# Patient Record
Sex: FEMALE | Race: WHITE | Employment: OTHER | URBAN - METROPOLITAN AREA
[De-identification: names, ages, dates, MRNs, and addresses within clinical notes are randomized per-mention and may not be internally consistent; named-entity substitution may affect disease eponyms.]

---

## 2019-03-04 ENCOUNTER — HOSPITAL ENCOUNTER (OUTPATIENT)
Dept: SURGERY | Age: 59
Discharge: HOME | End: 2019-03-04
Payer: OTHER GOVERNMENT

## 2019-03-04 VITALS
OXYGEN SATURATION: 99 % | DIASTOLIC BLOOD PRESSURE: 67 MMHG | BODY MASS INDEX: 35.14 KG/M2 | HEIGHT: 60 IN | WEIGHT: 179 LBS | TEMPERATURE: 98 F | SYSTOLIC BLOOD PRESSURE: 110 MMHG | RESPIRATION RATE: 16 BRPM | HEART RATE: 60 BPM

## 2019-03-04 LAB
ANION GAP SERPL CALC-SCNC: 7 MMOL/L (ref 7–16)
APPEARANCE UR: CLEAR
BACTERIA SPEC CULT: NORMAL
BASOPHILS # BLD: 0 K/UL (ref 0–0.2)
BASOPHILS NFR BLD: 1 % (ref 0–2)
BILIRUB UR QL: NEGATIVE
BUN SERPL-MCNC: 8 MG/DL (ref 6–23)
CALCIUM SERPL-MCNC: 8.9 MG/DL (ref 8.3–10.4)
CHLORIDE SERPL-SCNC: 107 MMOL/L (ref 98–107)
CO2 SERPL-SCNC: 29 MMOL/L (ref 21–32)
COLOR UR: YELLOW
CREAT SERPL-MCNC: 0.87 MG/DL (ref 0.6–1)
DIFFERENTIAL METHOD BLD: NORMAL
EOSINOPHIL # BLD: 0.2 K/UL (ref 0–0.8)
EOSINOPHIL NFR BLD: 3 % (ref 0.5–7.8)
ERYTHROCYTE [DISTWIDTH] IN BLOOD BY AUTOMATED COUNT: 12.4 % (ref 11.9–14.6)
GLUCOSE SERPL-MCNC: 102 MG/DL (ref 65–100)
GLUCOSE UR STRIP.AUTO-MCNC: NEGATIVE MG/DL
HCT VFR BLD AUTO: 39.7 % (ref 35.8–46.3)
HGB BLD-MCNC: 13.1 G/DL (ref 11.7–15.4)
HGB UR QL STRIP: NEGATIVE
IMM GRANULOCYTES # BLD AUTO: 0 K/UL (ref 0–0.5)
IMM GRANULOCYTES NFR BLD AUTO: 0 % (ref 0–5)
KETONES UR QL STRIP.AUTO: NEGATIVE MG/DL
LEUKOCYTE ESTERASE UR QL STRIP.AUTO: NEGATIVE
LYMPHOCYTES # BLD: 2 K/UL (ref 0.5–4.6)
LYMPHOCYTES NFR BLD: 40 % (ref 13–44)
MCH RBC QN AUTO: 31.3 PG (ref 26.1–32.9)
MCHC RBC AUTO-ENTMCNC: 33 G/DL (ref 31.4–35)
MCV RBC AUTO: 95 FL (ref 79.6–97.8)
MONOCYTES # BLD: 0.5 K/UL (ref 0.1–1.3)
MONOCYTES NFR BLD: 10 % (ref 4–12)
NEUTS SEG # BLD: 2.3 K/UL (ref 1.7–8.2)
NEUTS SEG NFR BLD: 46 % (ref 43–78)
NITRITE UR QL STRIP.AUTO: NEGATIVE
NRBC # BLD: 0 K/UL (ref 0–0.2)
PH UR STRIP: 6.5 [PH] (ref 5–9)
PLATELET # BLD AUTO: 213 K/UL (ref 150–450)
PMV BLD AUTO: 12.1 FL (ref 9.4–12.3)
POTASSIUM SERPL-SCNC: 3.7 MMOL/L (ref 3.5–5.1)
PROT UR STRIP-MCNC: NEGATIVE MG/DL
RBC # BLD AUTO: 4.18 M/UL (ref 4.05–5.2)
SERVICE CMNT-IMP: NORMAL
SODIUM SERPL-SCNC: 143 MMOL/L (ref 136–145)
SP GR UR REFRACTOMETRY: 1 (ref 1–1.02)
UROBILINOGEN UR QL STRIP.AUTO: 0.2 EU/DL (ref 0.2–1)
WBC # BLD AUTO: 5.1 K/UL (ref 4.3–11.1)

## 2019-03-04 PROCEDURE — 81003 URINALYSIS AUTO W/O SCOPE: CPT

## 2019-03-04 PROCEDURE — 85025 COMPLETE CBC W/AUTO DIFF WBC: CPT

## 2019-03-04 PROCEDURE — 87641 MR-STAPH DNA AMP PROBE: CPT

## 2019-03-04 PROCEDURE — 80048 BASIC METABOLIC PNL TOTAL CA: CPT

## 2019-03-04 RX ORDER — NITROFURANTOIN (MACROCRYSTALS) 100 MG/1
100 CAPSULE ORAL EVERY OTHER DAY
COMMUNITY

## 2019-03-04 RX ORDER — PANTOPRAZOLE SODIUM 40 MG/1
40 TABLET, DELAYED RELEASE ORAL 2 TIMES DAILY
COMMUNITY

## 2019-03-04 NOTE — PERIOP NOTES
Patient verified name, , and surgery as listed in [facility]. Patient provided medical/health information and PTA medications to the best of their ability. TYPE  CASE: 2  Orders per surgeon: not at time of PAT received and dated none  Labs per surgeon: cbc,bmp,urine,mrsa swab. Results: -  Labs per anesthesia protocol: none. Results -  EKG:  na     Nasal Swab collected per MD order and instructions for Mupirocin nasal ointment if required. Patient provided with and instructed on education handouts including Guide to Surgery, blood transfusions, pain management, and hand hygiene for the family and community, and [facility] brochure. Road to Recovery Spine surgery patient guide given. Instructed on incentive spirometry with return demonstration. Long handled prehab sponge given with instructions for use. Patient viewed spine prehab video. Hibiclens and instructions given per hospital policy. Instructed patient to continue previous medications as prescribed prior to surgery unless otherwise directed and to take the following medications the day of surgery according to anesthesia guidelines : klonopin as needed,estrogen,levothyroxine,medroxyprogesterone,nitrofurantoin,pantoprazole . Instructed patient to hold  the following medications on the day of surgery: celebrex. Original medication prescription bottles most visualized during patient appointment. Patient teach back successful and patient demonstrates knowledge of instruction.

## 2019-03-05 RX ORDER — CEFAZOLIN SODIUM/WATER 2 G/20 ML
2 SYRINGE (ML) INTRAVENOUS ONCE
Status: CANCELLED | OUTPATIENT
Start: 2019-03-05 | End: 2019-03-05

## 2019-03-07 NOTE — H&P
Chief complaint: Radiating neck pain. History of present illness: This patient is well-known to me from her previous lumbar surgery. She is satisfied in that regard. Now, she has been followed for about a 5 year history of neck pain with radiation to the left greater than right shoulder and upper extremity. She has known severe stenosis from C5-C7 based on MRI findings. She has had weakness in the left biceps. She had been managing the symptoms with Celebrex, aspirin, Tylenol, yoga, and has had a course of physical therapy and cervical traction. Despite these efforts, she has noticed an increase in unsteadiness on her feet and paresthesias in her hands. The pain is rated 8/10 on the visual analog scale. It is worse with overhead activity and some sleeping positions. PMHx/PSHx/Social History/Medications/Allergies/ROS: are listed separately in the electronic medical record and have been reviewed. ROS:     No fever, chills, or chest pain. ????? Medications: Celecoxib (200 MG); Medrol (4 MG, Take packet as instructed); NexIUM (40 MG); Synthroid (150 MCG)  ????? Allergies: NKDA  ?????    Physical Exam: This is a well developed well nourished adult female in no acute distress. Mood and affect are appropriate. Oriented to person, place, and time. Head is normocephalic and atraumatic. Extraocular movements intact. Sclera anicteric. Respirations are unlabored and there is no evidence of cyanosis. Chest is clear to auscultation. Heart is regular rate and rhythm. Abdomen is soft. There is subjective light touch sensory loss over the left lateral shoulder and lateral forearm. Spurlings is positive. The patient ambulates with a mildly unsteady gait. Deep tendon reflex testing in the upper extremity reveals the following. Right Left   Biceps (C5) 2 2   Brachio radialis (C6) 2 2    Triceps (C7) 2 2                 Bernards is positive on the left.   Ankle jerk is negative. Inverted radial reflex is negative. Romberg is positive. Strength testing in the upper extremity reveals the following based on the 5 point grading scale:     Delt(C5) Bicep(C6) WE(C6) Tricep (C7) WF(C7) (C8) Int (T1)   Right 5 5 5 5 5 5 5   Left 5 5 4 5 5 4 4     Pulses are palpable over bilateral radial arteries. Radiographic Studies:    X-rays and MRI were again independently reviewed and correlate with the patients symptoms. She has severe stenosis from C5-C7. AP and lateral views of the cervical spine obtained in office today show advanced degenerative disc with complete loss of disc height at C5-C6 and C6-C7. There is resultant kyphosis. Radiographic impression: Spondylotic kyphosis C5-C7. Assessment/Plan: This patients clinical history and physical exam is consistent with spondylotic kyphosis and cervical myelopathy. The imaging studies are concordant with the patients symptoms. Conservative efforts have been reasonably exhausted and the patient feels like she cannot go on with the symptoms as they are. We have previously discussed surgical options and now she would like to proceed with surgical scheduling.    ????? We discussed the details of the surgery including an incision over the left side of the front of the neck. The windpipe and foodpipe would be retracted to the side to expose the underlying spine. The appropriate disc would be identified with an X-ray and the disc would be removed. The nerves would be freed by trimming any impinging structures such as bone spurs and disc material.   The disc space would then be filled with a spacer and bone graft from a cadaver. A metal plate may be applied to augment the structure. A drain may be placed, and then the wound would be closed with suture and covered with sterile dressings. She would expect to stay in recovery for observation or in the hospital overnight depending on how quickly she recovers.  Follow-up would be scheduled for 2-3 weeks and she would have restrictions including no driving for 2 weeks, no lifting greater than 15 lbs. We also discussed the potential risks of the surgery including, but not limited to infection, spinal fluid leak, compressive hematoma; injury to spinal cord or peripheral nerve root resulting in paralysis, or loss of use of an extremity; persistent neck or arm symptoms or pain at the bone graft site; failure of the bone graft to heal or failure of the hardware resulting in the possibility of needing additional surgery; postoperative hoarseness or dysphagia; injury to an artery or vein resulting in significant blood loss; and the risks of anesthesia including, but not limited heart attack, stroke, blood clot or death. The patient was also given a brochure on anterior cervical discectomy and fusion. The patient voiced an understanding of these issues outlined. The procedure that I think may be beneficial here is an anterior cervical discectomy and fusion with interbody spacer, allograft and instrumentation from C5-7.             Electronically Signed By Evelyn Max MD

## 2019-03-11 ENCOUNTER — ANESTHESIA EVENT (OUTPATIENT)
Dept: SURGERY | Age: 59
End: 2019-03-11
Payer: OTHER GOVERNMENT

## 2019-03-12 ENCOUNTER — HOSPITAL ENCOUNTER (OUTPATIENT)
Age: 59
Setting detail: OBSERVATION
Discharge: HOME | End: 2019-03-13
Attending: ORTHOPAEDIC SURGERY | Admitting: ORTHOPAEDIC SURGERY
Payer: OTHER GOVERNMENT

## 2019-03-12 ENCOUNTER — ANESTHESIA (OUTPATIENT)
Dept: SURGERY | Age: 59
End: 2019-03-12
Payer: OTHER GOVERNMENT

## 2019-03-12 ENCOUNTER — APPOINTMENT (OUTPATIENT)
Dept: GENERAL RADIOLOGY | Age: 59
End: 2019-03-12
Attending: ORTHOPAEDIC SURGERY
Payer: OTHER GOVERNMENT

## 2019-03-12 DIAGNOSIS — M48.02 CERVICAL SPINAL STENOSIS: Primary | ICD-10-CM

## 2019-03-12 PROCEDURE — 74011250636 HC RX REV CODE- 250/636: Performed by: ORTHOPAEDIC SURGERY

## 2019-03-12 PROCEDURE — 77030039425 HC BLD LARYNG TRULITE DISP TELE -A: Performed by: ANESTHESIOLOGY

## 2019-03-12 PROCEDURE — 74011000250 HC RX REV CODE- 250: Performed by: ANESTHESIOLOGY

## 2019-03-12 PROCEDURE — 74011250637 HC RX REV CODE- 250/637: Performed by: ORTHOPAEDIC SURGERY

## 2019-03-12 PROCEDURE — 77030019908 HC STETH ESOPH SIMS -A: Performed by: ANESTHESIOLOGY

## 2019-03-12 PROCEDURE — 77030030163 HC BN WAX J&J -A: Performed by: ORTHOPAEDIC SURGERY

## 2019-03-12 PROCEDURE — 77030003666 HC NDL SPINAL BD -A: Performed by: ORTHOPAEDIC SURGERY

## 2019-03-12 PROCEDURE — 77030020782 HC GWN BAIR PAWS FLX 3M -B: Performed by: ANESTHESIOLOGY

## 2019-03-12 PROCEDURE — C1713 ANCHOR/SCREW BN/BN,TIS/BN: HCPCS | Performed by: ORTHOPAEDIC SURGERY

## 2019-03-12 PROCEDURE — 77030016570 HC BLNKT BAIR HGGR 3M -B: Performed by: ANESTHESIOLOGY

## 2019-03-12 PROCEDURE — 74011250636 HC RX REV CODE- 250/636: Performed by: ANESTHESIOLOGY

## 2019-03-12 PROCEDURE — 77030025623 HC BUR RND PRECIS STRY -D: Performed by: ORTHOPAEDIC SURGERY

## 2019-03-12 PROCEDURE — 74011000250 HC RX REV CODE- 250

## 2019-03-12 PROCEDURE — 77030003860 HC BIT DRL STRY -B: Performed by: ORTHOPAEDIC SURGERY

## 2019-03-12 PROCEDURE — 99218 HC RM OBSERVATION: CPT

## 2019-03-12 PROCEDURE — 76210000016 HC OR PH I REC 1 TO 1.5 HR: Performed by: ORTHOPAEDIC SURGERY

## 2019-03-12 PROCEDURE — 77030002986 HC SUT PROL J&J -A: Performed by: ORTHOPAEDIC SURGERY

## 2019-03-12 PROCEDURE — 77030039155 HC CGE SPN ANT CERV TRITANIUM STRY -G: Performed by: ORTHOPAEDIC SURGERY

## 2019-03-12 PROCEDURE — 74011000250 HC RX REV CODE- 250: Performed by: ORTHOPAEDIC SURGERY

## 2019-03-12 PROCEDURE — 74011250636 HC RX REV CODE- 250/636

## 2019-03-12 PROCEDURE — 72040 X-RAY EXAM NECK SPINE 2-3 VW: CPT

## 2019-03-12 PROCEDURE — 74011250637 HC RX REV CODE- 250/637: Performed by: ANESTHESIOLOGY

## 2019-03-12 PROCEDURE — 77030018836 HC SOL IRR NACL ICUM -A: Performed by: ORTHOPAEDIC SURGERY

## 2019-03-12 PROCEDURE — 77030032490 HC SLV COMPR SCD KNE COVD -B: Performed by: ORTHOPAEDIC SURGERY

## 2019-03-12 PROCEDURE — 77030009868 HC PIN DISTR CASPR AESC -B: Performed by: ORTHOPAEDIC SURGERY

## 2019-03-12 PROCEDURE — 76060000034 HC ANESTHESIA 1.5 TO 2 HR: Performed by: ORTHOPAEDIC SURGERY

## 2019-03-12 PROCEDURE — 77030031139 HC SUT VCRL2 J&J -A: Performed by: ORTHOPAEDIC SURGERY

## 2019-03-12 PROCEDURE — 76010000161 HC OR TIME 1 TO 1.5 HR INTENSV-TIER 1: Performed by: ORTHOPAEDIC SURGERY

## 2019-03-12 PROCEDURE — 77030011267 HC ELECTRD BLD COVD -A: Performed by: ORTHOPAEDIC SURGERY

## 2019-03-12 PROCEDURE — 77030037088 HC TUBE ENDOTRACH ORAL NSL COVD-A: Performed by: ANESTHESIOLOGY

## 2019-03-12 DEVICE — ANTERIOR CERVICAL CAGE
Type: IMPLANTABLE DEVICE | Site: SPINE CERVICAL | Status: FUNCTIONAL
Brand: TRITANIUM C

## 2019-03-12 DEVICE — TWO-LEVEL ANTERIOR PLATE
Type: IMPLANTABLE DEVICE | Site: SPINE CERVICAL | Status: FUNCTIONAL
Brand: AVIATOR

## 2019-03-12 DEVICE — VARIABLE, SELF TAPPING SCREW
Type: IMPLANTABLE DEVICE | Site: SPINE CERVICAL | Status: FUNCTIONAL
Brand: AVIATOR

## 2019-03-12 DEVICE — BIO DBM PUTTY
Type: IMPLANTABLE DEVICE | Site: SPINE CERVICAL | Status: FUNCTIONAL
Brand: BIO DBM

## 2019-03-12 DEVICE — GRAFT BNE SUB 1CC 2MM GRAN ALLOGENIC MORPHOGENETIC PROT W: Type: IMPLANTABLE DEVICE | Site: SPINE CERVICAL | Status: FUNCTIONAL

## 2019-03-12 RX ORDER — SODIUM CHLORIDE, SODIUM LACTATE, POTASSIUM CHLORIDE, CALCIUM CHLORIDE 600; 310; 30; 20 MG/100ML; MG/100ML; MG/100ML; MG/100ML
50 INJECTION, SOLUTION INTRAVENOUS CONTINUOUS
Status: DISCONTINUED | OUTPATIENT
Start: 2019-03-12 | End: 2019-03-12 | Stop reason: HOSPADM

## 2019-03-12 RX ORDER — OXYCODONE HYDROCHLORIDE 5 MG/1
5 TABLET ORAL
Status: DISCONTINUED | OUTPATIENT
Start: 2019-03-12 | End: 2019-03-13 | Stop reason: HOSPADM

## 2019-03-12 RX ORDER — ALBUTEROL SULFATE 0.83 MG/ML
2.5 SOLUTION RESPIRATORY (INHALATION) AS NEEDED
Status: DISCONTINUED | OUTPATIENT
Start: 2019-03-12 | End: 2019-03-12 | Stop reason: HOSPADM

## 2019-03-12 RX ORDER — ROCURONIUM BROMIDE 10 MG/ML
INJECTION, SOLUTION INTRAVENOUS AS NEEDED
Status: DISCONTINUED | OUTPATIENT
Start: 2019-03-12 | End: 2019-03-12 | Stop reason: HOSPADM

## 2019-03-12 RX ORDER — LIDOCAINE HYDROCHLORIDE 20 MG/ML
INJECTION, SOLUTION EPIDURAL; INFILTRATION; INTRACAUDAL; PERINEURAL AS NEEDED
Status: DISCONTINUED | OUTPATIENT
Start: 2019-03-12 | End: 2019-03-12 | Stop reason: HOSPADM

## 2019-03-12 RX ORDER — PANTOPRAZOLE SODIUM 40 MG/1
40 TABLET, DELAYED RELEASE ORAL
Status: DISCONTINUED | OUTPATIENT
Start: 2019-03-12 | End: 2019-03-13 | Stop reason: HOSPADM

## 2019-03-12 RX ORDER — MIDAZOLAM HYDROCHLORIDE 1 MG/ML
2 INJECTION, SOLUTION INTRAMUSCULAR; INTRAVENOUS ONCE
Status: DISCONTINUED | OUTPATIENT
Start: 2019-03-12 | End: 2019-03-12 | Stop reason: HOSPADM

## 2019-03-12 RX ORDER — DEXAMETHASONE SODIUM PHOSPHATE 4 MG/ML
INJECTION, SOLUTION INTRA-ARTICULAR; INTRALESIONAL; INTRAMUSCULAR; INTRAVENOUS; SOFT TISSUE AS NEEDED
Status: DISCONTINUED | OUTPATIENT
Start: 2019-03-12 | End: 2019-03-12 | Stop reason: HOSPADM

## 2019-03-12 RX ORDER — ACETAMINOPHEN 10 MG/ML
1000 INJECTION, SOLUTION INTRAVENOUS
Status: COMPLETED | OUTPATIENT
Start: 2019-03-12 | End: 2019-03-12

## 2019-03-12 RX ORDER — SCOLOPAMINE TRANSDERMAL SYSTEM 1 MG/1
1 PATCH, EXTENDED RELEASE TRANSDERMAL ONCE
Status: DISPENSED | OUTPATIENT
Start: 2019-03-12 | End: 2019-03-13

## 2019-03-12 RX ORDER — HYDROMORPHONE HYDROCHLORIDE 2 MG/ML
0.5 INJECTION, SOLUTION INTRAMUSCULAR; INTRAVENOUS; SUBCUTANEOUS
Status: COMPLETED | OUTPATIENT
Start: 2019-03-12 | End: 2019-03-12

## 2019-03-12 RX ORDER — FENTANYL CITRATE 50 UG/ML
100 INJECTION, SOLUTION INTRAMUSCULAR; INTRAVENOUS ONCE
Status: DISCONTINUED | OUTPATIENT
Start: 2019-03-12 | End: 2019-03-12 | Stop reason: HOSPADM

## 2019-03-12 RX ORDER — APREPITANT 40 MG/1
40 CAPSULE ORAL ONCE
Status: ACTIVE | OUTPATIENT
Start: 2019-03-12 | End: 2019-03-13

## 2019-03-12 RX ORDER — SODIUM CHLORIDE, SODIUM LACTATE, POTASSIUM CHLORIDE, CALCIUM CHLORIDE 600; 310; 30; 20 MG/100ML; MG/100ML; MG/100ML; MG/100ML
75 INJECTION, SOLUTION INTRAVENOUS CONTINUOUS
Status: DISCONTINUED | OUTPATIENT
Start: 2019-03-12 | End: 2019-03-12 | Stop reason: HOSPADM

## 2019-03-12 RX ORDER — GLYCOPYRROLATE 0.2 MG/ML
INJECTION INTRAMUSCULAR; INTRAVENOUS AS NEEDED
Status: DISCONTINUED | OUTPATIENT
Start: 2019-03-12 | End: 2019-03-12 | Stop reason: HOSPADM

## 2019-03-12 RX ORDER — PROPOFOL 10 MG/ML
INJECTION, EMULSION INTRAVENOUS AS NEEDED
Status: DISCONTINUED | OUTPATIENT
Start: 2019-03-12 | End: 2019-03-12 | Stop reason: HOSPADM

## 2019-03-12 RX ORDER — OXYCODONE HYDROCHLORIDE 5 MG/1
5 TABLET ORAL
Status: COMPLETED | OUTPATIENT
Start: 2019-03-12 | End: 2019-03-12

## 2019-03-12 RX ORDER — NEOSTIGMINE METHYLSULFATE 1 MG/ML
INJECTION INTRAVENOUS AS NEEDED
Status: DISCONTINUED | OUTPATIENT
Start: 2019-03-12 | End: 2019-03-12 | Stop reason: HOSPADM

## 2019-03-12 RX ORDER — OXYCODONE HYDROCHLORIDE 5 MG/1
5 TABLET ORAL
Qty: 40 TAB | Refills: 0 | Status: SHIPPED | OUTPATIENT
Start: 2019-03-12 | End: 2019-03-15

## 2019-03-12 RX ORDER — CEFAZOLIN SODIUM/WATER 2 G/20 ML
2 SYRINGE (ML) INTRAVENOUS ONCE
Status: COMPLETED | OUTPATIENT
Start: 2019-03-12 | End: 2019-03-12

## 2019-03-12 RX ORDER — HYDRALAZINE HYDROCHLORIDE 20 MG/ML
10 INJECTION INTRAMUSCULAR; INTRAVENOUS
Status: COMPLETED | OUTPATIENT
Start: 2019-03-12 | End: 2019-03-12

## 2019-03-12 RX ORDER — FENTANYL CITRATE 50 UG/ML
INJECTION, SOLUTION INTRAMUSCULAR; INTRAVENOUS AS NEEDED
Status: DISCONTINUED | OUTPATIENT
Start: 2019-03-12 | End: 2019-03-12 | Stop reason: HOSPADM

## 2019-03-12 RX ORDER — ONDANSETRON 4 MG/1
4 TABLET, ORALLY DISINTEGRATING ORAL
Status: DISCONTINUED | OUTPATIENT
Start: 2019-03-12 | End: 2019-03-13 | Stop reason: HOSPADM

## 2019-03-12 RX ORDER — LIDOCAINE HYDROCHLORIDE 10 MG/ML
0.1 INJECTION INFILTRATION; PERINEURAL AS NEEDED
Status: DISCONTINUED | OUTPATIENT
Start: 2019-03-12 | End: 2019-03-12 | Stop reason: HOSPADM

## 2019-03-12 RX ORDER — ONDANSETRON 2 MG/ML
INJECTION INTRAMUSCULAR; INTRAVENOUS AS NEEDED
Status: DISCONTINUED | OUTPATIENT
Start: 2019-03-12 | End: 2019-03-12 | Stop reason: HOSPADM

## 2019-03-12 RX ORDER — MIDAZOLAM HYDROCHLORIDE 1 MG/ML
2 INJECTION, SOLUTION INTRAMUSCULAR; INTRAVENOUS
Status: COMPLETED | OUTPATIENT
Start: 2019-03-12 | End: 2019-03-12

## 2019-03-12 RX ADMIN — PANTOPRAZOLE SODIUM 40 MG: 40 TABLET, DELAYED RELEASE ORAL at 23:39

## 2019-03-12 RX ADMIN — LIDOCAINE HYDROCHLORIDE 80 MG: 20 INJECTION, SOLUTION EPIDURAL; INFILTRATION; INTRACAUDAL; PERINEURAL at 12:37

## 2019-03-12 RX ADMIN — HYDROMORPHONE HYDROCHLORIDE 0.5 MG: 2 INJECTION, SOLUTION INTRAMUSCULAR; INTRAVENOUS; SUBCUTANEOUS at 14:06

## 2019-03-12 RX ADMIN — ROCURONIUM BROMIDE 50 MG: 10 INJECTION, SOLUTION INTRAVENOUS at 12:38

## 2019-03-12 RX ADMIN — ONDANSETRON 4 MG: 4 TABLET, ORALLY DISINTEGRATING ORAL at 23:39

## 2019-03-12 RX ADMIN — PROPOFOL 150 MG: 10 INJECTION, EMULSION INTRAVENOUS at 12:37

## 2019-03-12 RX ADMIN — SODIUM CHLORIDE, SODIUM LACTATE, POTASSIUM CHLORIDE, AND CALCIUM CHLORIDE 75 ML/HR: 600; 310; 30; 20 INJECTION, SOLUTION INTRAVENOUS at 10:52

## 2019-03-12 RX ADMIN — ONDANSETRON 4 MG: 4 TABLET, ORALLY DISINTEGRATING ORAL at 19:41

## 2019-03-12 RX ADMIN — MIDAZOLAM HYDROCHLORIDE 2 MG: 2 INJECTION, SOLUTION INTRAMUSCULAR; INTRAVENOUS at 12:21

## 2019-03-12 RX ADMIN — FENTANYL CITRATE 100 MCG: 50 INJECTION, SOLUTION INTRAMUSCULAR; INTRAVENOUS at 12:37

## 2019-03-12 RX ADMIN — HYDRALAZINE HYDROCHLORIDE 10 MG: 20 INJECTION INTRAMUSCULAR; INTRAVENOUS at 14:46

## 2019-03-12 RX ADMIN — HYDROMORPHONE HYDROCHLORIDE 0.5 MG: 2 INJECTION, SOLUTION INTRAMUSCULAR; INTRAVENOUS; SUBCUTANEOUS at 14:30

## 2019-03-12 RX ADMIN — FENTANYL CITRATE 25 MCG: 50 INJECTION, SOLUTION INTRAMUSCULAR; INTRAVENOUS at 13:17

## 2019-03-12 RX ADMIN — FENTANYL CITRATE 25 MCG: 50 INJECTION, SOLUTION INTRAMUSCULAR; INTRAVENOUS at 13:12

## 2019-03-12 RX ADMIN — PROPOFOL 20 MG: 10 INJECTION, EMULSION INTRAVENOUS at 13:42

## 2019-03-12 RX ADMIN — HYDROMORPHONE HYDROCHLORIDE 0.5 MG: 2 INJECTION, SOLUTION INTRAMUSCULAR; INTRAVENOUS; SUBCUTANEOUS at 14:39

## 2019-03-12 RX ADMIN — ONDANSETRON 4 MG: 2 INJECTION INTRAMUSCULAR; INTRAVENOUS at 13:39

## 2019-03-12 RX ADMIN — OXYCODONE HYDROCHLORIDE 5 MG: 5 TABLET ORAL at 19:41

## 2019-03-12 RX ADMIN — ACETAMINOPHEN 1000 MG: 10 INJECTION, SOLUTION INTRAVENOUS at 15:02

## 2019-03-12 RX ADMIN — OXYCODONE HYDROCHLORIDE 5 MG: 5 TABLET ORAL at 23:39

## 2019-03-12 RX ADMIN — NEOSTIGMINE METHYLSULFATE 4 MG: 1 INJECTION INTRAVENOUS at 13:39

## 2019-03-12 RX ADMIN — Medication 3 AMPULE: at 10:52

## 2019-03-12 RX ADMIN — PROMETHAZINE HYDROCHLORIDE 6.25 MG: 25 INJECTION INTRAMUSCULAR; INTRAVENOUS at 15:39

## 2019-03-12 RX ADMIN — HYDROMORPHONE HYDROCHLORIDE 0.5 MG: 2 INJECTION, SOLUTION INTRAMUSCULAR; INTRAVENOUS; SUBCUTANEOUS at 14:23

## 2019-03-12 RX ADMIN — LEVOTHYROXINE SODIUM 175 MCG: 100 TABLET ORAL at 23:39

## 2019-03-12 RX ADMIN — OXYCODONE HYDROCHLORIDE 5 MG: 5 TABLET ORAL at 14:54

## 2019-03-12 RX ADMIN — FENTANYL CITRATE 50 MCG: 50 INJECTION, SOLUTION INTRAMUSCULAR; INTRAVENOUS at 13:04

## 2019-03-12 RX ADMIN — GLYCOPYRROLATE 0.6 MG: 0.2 INJECTION INTRAMUSCULAR; INTRAVENOUS at 13:39

## 2019-03-12 RX ADMIN — DEXAMETHASONE SODIUM PHOSPHATE 10 MG: 4 INJECTION, SOLUTION INTRA-ARTICULAR; INTRALESIONAL; INTRAMUSCULAR; INTRAVENOUS; SOFT TISSUE at 12:54

## 2019-03-12 RX ADMIN — Medication 2 G: at 12:48

## 2019-03-12 NOTE — PROGRESS NOTES
TRANSFER - IN REPORT:    Verbal report received from Patel(name) on Wilber Tacos  being received from [facility]) for ordered procedure      Report consisted of patients Situation, Background, Assessment and   Recommendations(SBAR). Information from the following report(s) SBAR was reviewed with the receiving nurse. Opportunity for questions and clarification was provided. Assessment completed upon patients arrival to unit and care assumed.

## 2019-03-12 NOTE — ANESTHESIA POSTPROCEDURE EVALUATION
Procedure(s):  ANTERIOR CERVICAL DISCECTOMY FUSION  WITH INTERBODY SPACER ALLOGRAFT INSTRUMENTATION C5-C7.     Anesthesia Post Evaluation      Multimodal analgesia: multimodal analgesia used between 6 hours prior to anesthesia start to PACU discharge  Patient location during evaluation: PACU  Patient participation: complete - patient participated  Level of consciousness: awake  Pain management: adequate  Airway patency: patent  Anesthetic complications: no  Cardiovascular status: acceptable  Respiratory status: acceptable, spontaneous ventilation and nonlabored ventilation  Hydration status: acceptable  Post anesthesia nausea and vomiting:  none      Visit Vitals  /86   Pulse 64   Temp 36.5 °C (97.7 °F)   Resp 22   Ht 5' (1.524 m)   Wt 79.5 kg (175 lb 3 oz)   SpO2 95%   BMI 34.21 kg/m²

## 2019-03-12 NOTE — PERIOP NOTES
Report received from [facility]. Patient waiting for placement on surgical floor, patient doing well, resting with her eyes closed, in no obvious signs of distress or needs at this time. Patient's  at bedside to accompany patient. Will continue to monitor patient in PACU.

## 2019-03-12 NOTE — ROUTINE PROCESS
TRANSFER - OUT REPORT:    Verbal [facility] to [facility] [phone] for routine post - op       Report consisted of patients Situation, Background, Assessment and   Recommendations(SBAR). Information from the following report(s) SBAR, OR Summary, Procedure Summary, Intake/Output and MAR was reviewed with the receiving nurse. Lines:   Peripheral IV 03/12/19 Left;Posterior Hand (Active)   Site Assessment Clean, dry, & intact 3/12/2019  4:49 PM   Phlebitis Assessment 0 3/12/2019  4:49 PM   Infiltration Assessment 0 3/12/2019  4:49 PM   Dressing Status Clean, dry, & intact 3/12/2019  4:49 PM   Dressing Type Transparent;Tape 3/12/2019  4:49 PM   Hub Color/Line Status Infusing 3/12/2019  4:49 PM   Alcohol Cap Used No 3/12/2019  4:49 PM        Opportunity for questions and clarification was provided. Patient transported with:   O2 @ 3 liters    VTE prophylaxis orders have been written for [facility]. Patient and family given floor number and nurses name. Family updated re: pt status after security code verified.  at bedside.

## 2019-03-12 NOTE — PROGRESS NOTES
END OF SHIFT NOTE:    INTAKE/OUTPUT  No intake/output data recorded. Voiding: YES  Catheter: NO  Drain:              Flatus: Patient does not have flatus present. Stool:  0 occurrences. Characteristics:       Emesis: 0 occurrences. Characteristics:        VITAL SIGNS  Patient Vitals for the past 12 hrs:   Temp Pulse Resp BP SpO2   03/12/19 1741 97.3 °F (36.3 °C) (!) 104 13 133/80 94 %   03/12/19 1723  96 12  94 %   03/12/19 1707  (!) 105 13 113/67 93 %   03/12/19 1649  (!) 110 13 125/70 93 %   03/12/19 1627  (!) 108 18 130/73 93 %   03/12/19 1607  (!) 107 14 124/64 92 %   03/12/19 1600  (!) 108 14  92 %   03/12/19 1546  (!) 114 15 137/67 92 %   03/12/19 1532  (!) 115 16 144/71 92 %   03/12/19 1518 97.9 °F (36.6 °C) (!) 105 15 153/73 93 %   03/12/19 1512  (!) 105 14 169/81 93 %   03/12/19 1503  97 20 175/88 94 %   03/12/19 1443  69 12 (!) 231/91 95 %   03/12/19 1436  70 13 (!) 214/91 96 %   03/12/19 1423  66 18 (!) 202/81 95 %   03/12/19 1417  62 16 194/79 95 %   03/12/19 1412  65 17 183/70 (!) 84 %   03/12/19 1409  60 15 176/77    03/12/19 1404 97.7 °F (36.5 °C) 64 22 195/86 95 %   03/12/19 1402  63   96 %   03/12/19 1029 98.5 °F (36.9 °C) 61 18 119/61 99 %       Pain Assessment  Pain Intensity 1: 0 (03/12/19 1649)  Pain Location 1: Neck  Pain Intervention(s) 1: Medication (see MAR), Emotional support       Ambulating  Yes    Shift report given to oncoming nurse at the bedside.     Zonia Hollis RN

## 2019-03-12 NOTE — OP NOTES
[address] [address]   [phone]    OPERATIVE REPORT  Patient ID:Sury Benitez  [id number]  1960  66 y.o. DATE OF SURGERY: 3/12/2019    SURGEON: Angie Thomas M.D. PREOPERATIVE DIAGNOSIS:  C5 - C7 stenosis. POSTOPERATIVE DIAGNOSIS:  C5 - C7 stenosis. PROCEDURE:     1. Anterior cervical diskectomy  C5 - C7 with decompression of the neural elements under microscope magnification. 2. Anterior cervical arthrodesis  C5 - C7.     3. Anterior cervical instrumentation  C5 - C7.     4. Insertion biomechanical device C5-6 and C6-7    ANESTHESIA:  General.    ESTIMATED BLOOD LOSS:  10 cc    INTRAOPERATIVE COMPLICATIONS:  None. POSTOPERATIVE CONDITION:  Stable. IMPLANTS:   Implant Name Type Inv. Item Serial No.  Lot No. LRB No. Used Action   GRAFT BNE GRAN 1ML -- OSTEOAMP - [id number]  GRAFT BNE GRAN 1ML -- OSTEOAMP [id number] [employer]  N/A 1 Implanted   GRAFT BNE DBM PTTY W/CHIPS 1ML -- BIO DBM - [id number]  GRAFT BNE DBM PTTY W/CHIPS 1ML -- BIO DBM  [employer]M [id number] N/A 1 Implanted   CAGE ANTR CERV [id number] STRL -- TRITANIUM-C - [id number]  CAGE ANTR CERV [id number] STRL -- TRITANIUM-C  [employer] [id number] N/A 1 Implanted   CAGE ANTR CERV [id number] STRL -- TRITANIUM-C - [id number]  CAGE ANTR CERV [id number] STRL -- TRITANIUM-C  [employer] HOWM [id number] N/A 1 Implanted   SCR BNE VA ST 4X14MM -- AVIATOR - [id number]  SCR BNE VA ST 4X14MM -- AVIATOR  [employer] HOWM [id number] N/A 6 Implanted   PLATE ANTR CERV 2 LVL SZ 26MM -- AVIATOR - [id number]  PLATE ANTR CERV 2 LVL SZ 26MM -- AVIATOR  F[employer] HOWM [id number] N/A 1 Implanted       INDICATIONS FOR PROCEDURE:  The patient has had persistent symptoms of cervical radiculopathy despite conservative treatment. The preoperative studies confirmed a concordant stenotic lesion resulting in neural inpingement.    The risks, benefits and potential complications of the above listed procedures were discussed with the patient in detail and an informed consent was obtained. DESCRIPTION OF PROCEDURE:  After adequate induction of general anesthesia the patient was positioned supine on the operating table. A shoulder roll was placed and the shoulders were taped caudally to facilitate intraoperative radiographic imaging. The neck was kept in a neutral position. Care was taken to pad all bony prominences. Preoperative antibiotics were given. The neck was prepped and draped in the usual sterile fashion. A time-out was called to confirm appropriate patient, proposed procedure and proposed incision site. With this confirmation an incision was created over the left anterior lateral aspect of the neck centered near the cricoid cartilage. Dissection was carried down through the platysma using electrocautery. A Sharma-Mcdonald approach was then performed down to the anterior cervical spine. A spinal needle was inserted in an interspace and a cross-table lateral fluoroscopic image was obtained. The appropriate level was marked with electrocautery and the spinal needle was removed. At this point the longus colli was elevated around the periphery of the appropriate disk space and Veneta retractors were  inserted beneath the longus colli. Veneta pins were then inserted in the C5 and C6 vertebral bodies and distraction applied across the annulus fibrosus. The operating microscope was draped and brought to the sterile field. An annulotomy was performed with a 15 blade and a complete diskectomy was performed using pituitary and 3 mm Kerrison. The diskectomy was carried out to the lateral border of the uncovertebral joints bilaterally. A 4 mm bur was then used to trim the anterior osteophytes as well as flatten the vertebral endplates in preparation for arthrodesis. The anterior aspect of the uncovertebral joints were also resected using the bur.   The posterior portions of the uncovertebral joints were taken down with a 2 mm Kerrison. An interval was developed in the posterior longitudinal ligament and annulus fibrosus with a micro nerve hook. A 2 mm Kerrison was then used to resect these structures out to the lateral border of the uncal vertebral joints bilaterally. A ball tipped nerve hook was used to palpate laterally and confirm no residual nerve root or spinal cord impingement. This was felt to be satisfactory bilaterally. The interbody sizing system was brought to the field and a size 5  lordotic spacer fit very nicely. The appropriate size spacer was selected and filled with allograft and impacted with a tamp and mallet after the wound was liberally irrigated. Taylor pins were then inserted in the C6 and C7 vertebral bodies and distraction applied across the annulus fibrosus. The operating microscope was draped and brought to the sterile field. An annulotomy was performed with a 15 blade and a complete diskectomy was performed using pituitary and 3 mm Kerrison. The diskectomy was carried out to the lateral border of the uncovertebral joints bilaterally. A 4 mm bur was then used to trim the anterior osteophytes as well as flatten the vertebral endplates in preparation for arthrodesis. The anterior aspect of the uncovertebral joints were also resected using the bur. The posterior portions of the uncovertebral joints were taken down with a 2 mm Kerrison. An interval was developed in the posterior longitudinal ligament and annulus fibrosus with a micro nerve hook. A 2 mm Kerrison was then used to resect these structures out to the lateral border of the uncal vertebral joints bilaterally. A ball tipped nerve hook was used to palpate laterally and confirm no residual nerve root or spinal cord impingement. This was felt to be satisfactory bilaterally.     The interbody sizing system was brought to the field and a size 6  lordotic spacer fit very nicely. The appropriate size spacer selected filled with allograft and impacted with a bone tamp and mallet after the wound was liberally irrigated. The anterior cervical plating system was brought to the field and an appropriate size plate was selected and applied across  C5 - C7. The peripheral screw holes were drilled and filled appropriate length screws. The locking mechanism was tightened. C-arm fluoroscopy was brought in and used to obtain AP and lateral images, both of which were felt to be satisfactory for appropriate spinal level, graft and hardware placement. The wound was liberally irrigated. The incision and the incision was closed in a layered fashion. Benzoin and Steri-Strips were applied. Sterile dressings were applied. The patient tolerated the procedure well and was returned to the post anesthesia care unit in stable condition.      Blayne Beltran MD

## 2019-03-12 NOTE — ANESTHESIA PREPROCEDURE EVALUATION
Anesthetic History     PONV          Review of Systems / Medical History      Pulmonary  Within defined limits                 Neuro/Psych         Headaches and psychiatric history     Cardiovascular                  Exercise tolerance: >4 METS     GI/Hepatic/Renal     GERD: well controlled           Endo/Other      Hypothyroidism  Obesity and arthritis     Other Findings              Physical Exam    Airway  Mallampati: II  TM Distance: 4 - 6 cm  Neck ROM: normal range of motion   Mouth opening: Normal     Cardiovascular    Rhythm: regular  Rate: normal         Dental    Dentition: Bridges     Pulmonary  Breath sounds clear to auscultation               Abdominal         Other Findings            Anesthetic Plan    ASA: 2  Anesthesia type: general          Induction: Intravenous  Anesthetic plan and risks discussed with: Patient

## 2019-03-12 NOTE — PROGRESS NOTES
03/12/19 1741   Dual Skin Pressure Injury Assessment   Dual Skin Pressure Injury Assessment WDL   Second Care Provider (Based on [address] [place] [address]) Enrike Billow, [place]

## 2019-03-13 VITALS
OXYGEN SATURATION: 93 % | BODY MASS INDEX: 34.39 KG/M2 | HEIGHT: 60 IN | SYSTOLIC BLOOD PRESSURE: 105 MMHG | TEMPERATURE: 98.2 F | DIASTOLIC BLOOD PRESSURE: 70 MMHG | WEIGHT: 175.19 LBS | RESPIRATION RATE: 18 BRPM | HEART RATE: 83 BPM

## 2019-03-13 PROCEDURE — 74011250637 HC RX REV CODE- 250/637: Performed by: ORTHOPAEDIC SURGERY

## 2019-03-13 PROCEDURE — 99218 HC RM OBSERVATION: CPT

## 2019-03-13 RX ADMIN — OXYCODONE HYDROCHLORIDE 5 MG: 5 TABLET ORAL at 04:27

## 2019-03-13 NOTE — DISCHARGE INSTRUCTIONS
Soft diet    MAY shower    LEAVE dressing on incision for 3 DAYS-->then may remove   (If dressing starts to fall off may re-secure with tape)    NO lifting anything heavier than 5LBS     MAY turn head to the right and left and look down--> MINIMIZE looking upwards    Avoid reaching above head    NO driving until directed by your doctor    DO NOT take any NSAIDS (either prescribed or over the counter until directed    (Aleve, Ibuprofen, Mobic, etc) as this will interfere with bone healing    Avoid having pets sleep in bed with you until incision is completely healed    CALL the doctor if:  Fever >100.5  ([phone])                 Incision becomes red ,swollen or opens up               Incision has yellow, thick drainage or an odor               Pain is not managed with prescribed medications               Excessive nausea and/or vomiting                                     Increased difficulty with swallowing        DISCHARGE SUMMARY from Nurse    PATIENT INSTRUCTIONS:    After general anesthesia or intravenous sedation, for 24 hours or while taking prescription Narcotics:  · Limit your activities  · Do not drive and operate hazardous machinery  · Do not make important personal or business decisions  · Do  not drink alcoholic beverages  · If you have not urinated within 8 hours after discharge, please contact your surgeon on call. Report the following to your surgeon:  · Excessive pain, swelling, redness or odor of or around the surgical area  · Temperature over 100.5  · Nausea and vomiting lasting longer than 4 hours or if unable to take medications  · Any signs of decreased circulation or nerve impairment to extremity: change in color, persistent  numbness, tingling, coldness or increase pain  · Any questions    What to do at Home:  Recommended activity: Activity as instructed by your surgeon.     If you experience any of the following symptoms: fever greater than 100.5, numbness or tingling or pain, nausea/ vomiting not controlled by medication, please follow up with your MD.    *  Please give a list of your current medications to your Primary Care Provider. *  Please update this list whenever your medications are discontinued, doses are      changed, or new medications (including over-the-counter products) are added. *  Please carry medication information at all times in case of emergency situations. These are general instructions for a healthy lifestyle:    No smoking/ No tobacco products/ Avoid exposure to second hand smoke  Surgeon General's Warning:  Quitting smoking now greatly reduces serious risk to your health. Obesity, smoking, and sedentary lifestyle greatly increases your risk for illness    A healthy diet, regular physical exercise & weight monitoring are important for maintaining a healthy lifestyle    You may be retaining fluid if you have a history of heart failure or if you experience any of the following symptoms:  Weight gain of 3 pounds or more overnight or 5 pounds in a week, increased swelling in our hands or feet or shortness of breath while lying flat in bed. Please call your doctor as soon as you notice any of these symptoms; do not wait until your next office visit. Recognize signs and symptoms of STROKE:    F-face looks uneven    A-arms unable to move or move unevenly    S-speech slurred or non-existent    T-time-call 911 as soon as signs and symptoms begin-DO NOT go       Back to bed or wait to see if you get better-TIME IS BRAIN. Warning Signs of HEART ATTACK     Call 911 if you have these symptoms:   Chest discomfort. Most heart attacks involve discomfort in the center of the chest that lasts more than a few minutes, or that goes away and comes back. It can feel like uncomfortable pressure, squeezing, fullness, or pain.  Discomfort in other areas of the upper body.  Symptoms can include pain or discomfort in one or both arms, the back, neck, jaw, or stomach.  Shortness of breath with or without chest discomfort.  Other signs may include breaking out in a cold sweat, nausea, or lightheadedness. Don't wait more than five minutes to call 911 - MINUTES MATTER! Fast action can save your life. Calling 911 is almost always the fastest way to get lifesaving treatment. Emergency Medical Services staff can begin treatment when they arrive -- up to an hour sooner than if someone gets to the hospital by car. The discharge information has been reviewed with the patient. The patient verbalized understanding. Discharge medications reviewed with the patient and appropriate educational materials and side effects teaching were provided. ___________________________________________________________________________________________________________________________________Discharge Instructions    Wound Care and Showering  Your wound will typically be covered with a clear plastic dressing when you go home from the hospital. Since it is transparent, you will see the underlying gauze turn red with blood which is normal. You do not need to change the dressing unless it is leaking from the edges. Otherwise leave this dressing in place. The clear plastic dressing is waterproof so you can take a shower while it is on. You may remove the clear plastic dressing and the underlying gauze 3 days after surgery. There will be small tape strips under the gauze which should be left in place. If there is no leaking from the wound, you may take a shower and allow the tape strips to get wet. Some of them may fall off. The remaining strips will be removed once you return to the office. If there is persistent leaking when you first remove the clear dressing, apply new gauze and new clear plastic dressing (typically purchased at a pharmacy) over the wound. Hair washing is permissible in the shower. No tub baths, hot tubs or whirlpools until seen in the office.  If any of the following should occur, please call the office:    -Persistent drainage from the incision site.  -Opening of incisions  -Fevers greater than 101 degrees  -Flu-like symptoms  -Increased redness    Exercise  You have unlimited walking and stair climbing privileges. Walking outside or walking on a treadmill without an incline is also allowed. Do NOT lift anything weighing greater than 10-15 lbs. Especially try to avoid lifting or reaching above your head. Sleeping  You may sleep in any comfortable position. Many patients find comfort sleeping in a recliner chair. It is normal to have difficulty sleeping for the first several weeks following your surgery. We recommend trying Benadryl, Melatonin, or Tylenol PM for help sleeping. All are over-the-counter and can be found in drugstores. Eating  Because of the tubes in your throat while asleep during surgery, it is normal to have a sore throat and some difficulty swallowing solid foods after your surgery. This may persist for several weeks. Eating soft foods like yogurt, macaroni, and mashed potatoes seem to help. Today, you may have bland foods, nothing spicy or greasy. Pain  If you feel you need pain medicine, you may take regular or extra-strength Tylenol. Do NOT take an anti-inflammatory medication such as Advil, Aleve, or Motrin for the first 8 weeks following your surgery. Anti-inflammatory medications like these hinder bone growth and healing, which is critical in the weeks following surgery. Do NOT resume taking Foasamax for 8 weeks after your fusion surgery. To help alleviate persistent soreness around the shoulder lades, apply ice or warm moist compresses. Driving  You may NOT drive a car until told otherwise by your physician. You may be a passenger for short distances (about 20-30 minutes). If you must take a longer trip, be sure to make several pit stops so that you can walk and stretch your legs.  Reclining in the passenger seat seems to be the most comfortable position for most patients. In some states, it is illegal to drive a car while wearing a neck brace. Follow up appointments  When you are discharged from the hospital, a follow up appointment will be made for 2-3 weeks from your surgery date. Call [phone] to confirm your appointment. After general anesthesia or intravenous sedation, for 24 hours or while taking prescription Narcotics:  · Limit your activities  · Do not drive and operate hazardous machinery  · Do not make important personal or business decisions  · Do  not drink alcoholic beverages  · If you have not urinated within 8 hours after discharge, please contact your surgeon on call. *  Please give a list of your current medications to your Primary Care Provider. *  Please update this list whenever your medications are discontinued, doses are      changed, or new medications (including over-the-counter products) are added. *  Please carry medication information at all times in case of emergency situations. These are general instructions for a healthy lifestyle:  No smoking/ No tobacco products/ Avoid exposure to second hand smoke  Surgeon General's Warning:  Quitting smoking now greatly reduces serious risk to your health. Obesity, smoking, and sedentary lifestyle greatly increases your risk for illness  A healthy diet, regular physical exercise & weight monitoring are important for maintaining a healthy lifestyle    You may be retaining fluid if you have a history of heart failure or if you experience any of the following symptoms:  Weight gain of 3 pounds or more overnight or 5 pounds in a week, increased swelling in our hands or feet or shortness of breath while lying flat in bed. Please call your doctor as soon as you notice any of these symptoms; do not wait until your next office visit.     Recognize signs and symptoms of STROKE:  F-face looks uneven  A-arms unable to move or move unevenly  S-speech slurred or non-existent  T-time-call 911 as soon as signs and symptoms begin-DO NOT go       Back to bed or wait to see if you get better-TIME IS BRAIN.

## 2019-03-13 NOTE — PROGRESS NOTES
Pt face and chest flushed and back very red; hot to touch. Pt denies itching or pain. States she went tanning this past weekend and she thinks she may be burnt from the tanning bed.

## 2019-03-13 NOTE — PROGRESS NOTES
Chart screened by  for discharge planning. No needs identified at this time. Please consult  if any new issues arise. Pt to discharge home this day. No voiced needs at this time. Milestones met.      Care Management Interventions  Transition of Care Consult (CM Consult): Discharge Planning

## 2020-06-23 ENCOUNTER — HOSPITAL ENCOUNTER (OUTPATIENT)
Dept: SURGERY | Age: 60
Discharge: HOME | End: 2020-06-23
Payer: OTHER GOVERNMENT

## 2020-06-23 ENCOUNTER — HOME HEALTH ADMISSION (OUTPATIENT)
Dept: HOME HEALTH SERVICES | Facility: HOME HEALTH | Age: 60
End: 2020-06-23
Payer: OTHER GOVERNMENT

## 2020-06-23 ENCOUNTER — HOSPITAL ENCOUNTER (OUTPATIENT)
Dept: PHYSICAL THERAPY | Age: 60
Discharge: HOME | End: 2020-06-23
Payer: OTHER GOVERNMENT

## 2020-06-23 VITALS
DIASTOLIC BLOOD PRESSURE: 85 MMHG | OXYGEN SATURATION: 97 % | RESPIRATION RATE: 12 BRPM | BODY MASS INDEX: 32.7 KG/M2 | WEIGHT: 166.56 LBS | SYSTOLIC BLOOD PRESSURE: 128 MMHG | TEMPERATURE: 98.1 F | HEIGHT: 60 IN | HEART RATE: 76 BPM

## 2020-06-23 LAB
ANION GAP SERPL CALC-SCNC: 8 MMOL/L (ref 7–16)
APTT PPP: 24.4 SEC (ref 24.3–35.4)
ATRIAL RATE: 61 BPM
BACTERIA SPEC CULT: NORMAL
BASOPHILS # BLD: 0.1 K/UL (ref 0–0.2)
BASOPHILS NFR BLD: 1 % (ref 0–2)
BUN SERPL-MCNC: 18 MG/DL (ref 6–23)
CALCIUM SERPL-MCNC: 9.2 MG/DL (ref 8.3–10.4)
CALCULATED P AXIS, ECG09: 25 DEGREES
CALCULATED R AXIS, ECG10: 27 DEGREES
CALCULATED T AXIS, ECG11: 33 DEGREES
CHLORIDE SERPL-SCNC: 107 MMOL/L (ref 98–107)
CO2 SERPL-SCNC: 26 MMOL/L (ref 21–32)
CREAT SERPL-MCNC: 0.78 MG/DL (ref 0.6–1)
DIAGNOSIS, 93000: NORMAL
DIFFERENTIAL METHOD BLD: NORMAL
EOSINOPHIL # BLD: 0.2 K/UL (ref 0–0.8)
EOSINOPHIL NFR BLD: 3 % (ref 0.5–7.8)
ERYTHROCYTE [DISTWIDTH] IN BLOOD BY AUTOMATED COUNT: 11.9 % (ref 11.9–14.6)
EST. AVERAGE GLUCOSE BLD GHB EST-MCNC: 105 MG/DL
GLUCOSE SERPL-MCNC: 93 MG/DL (ref 65–100)
HBA1C MFR BLD: 5.3 %
HCT VFR BLD AUTO: 40.8 % (ref 35.8–46.3)
HGB BLD-MCNC: 13.7 G/DL (ref 11.7–15.4)
IMM GRANULOCYTES # BLD AUTO: 0 K/UL (ref 0–0.5)
IMM GRANULOCYTES NFR BLD AUTO: 0 % (ref 0–5)
INR PPP: 0.9
LYMPHOCYTES # BLD: 2 K/UL (ref 0.5–4.6)
LYMPHOCYTES NFR BLD: 35 % (ref 13–44)
MCH RBC QN AUTO: 31.6 PG (ref 26.1–32.9)
MCHC RBC AUTO-ENTMCNC: 33.6 G/DL (ref 31.4–35)
MCV RBC AUTO: 94 FL (ref 79.6–97.8)
MONOCYTES # BLD: 0.5 K/UL (ref 0.1–1.3)
MONOCYTES NFR BLD: 8 % (ref 4–12)
NEUTS SEG # BLD: 3 K/UL (ref 1.7–8.2)
NEUTS SEG NFR BLD: 53 % (ref 43–78)
NRBC # BLD: 0 K/UL (ref 0–0.2)
P-R INTERVAL, ECG05: 124 MS
PLATELET # BLD AUTO: 209 K/UL (ref 150–450)
PMV BLD AUTO: 12.2 FL (ref 9.4–12.3)
POTASSIUM SERPL-SCNC: 4.1 MMOL/L (ref 3.5–5.1)
PROTHROMBIN TIME: 12.1 SEC (ref 12–14.7)
Q-T INTERVAL, ECG07: 394 MS
QRS DURATION, ECG06: 66 MS
QTC CALCULATION (BEZET), ECG08: 396 MS
RBC # BLD AUTO: 4.34 M/UL (ref 4.05–5.2)
SERVICE CMNT-IMP: NORMAL
SODIUM SERPL-SCNC: 141 MMOL/L (ref 136–145)
VENTRICULAR RATE, ECG03: 61 BPM
WBC # BLD AUTO: 5.8 K/UL (ref 4.3–11.1)

## 2020-06-23 PROCEDURE — 85025 COMPLETE CBC W/AUTO DIFF WBC: CPT

## 2020-06-23 PROCEDURE — 77030027138 HC INCENT SPIROMETER -A

## 2020-06-23 PROCEDURE — 85610 PROTHROMBIN TIME: CPT

## 2020-06-23 PROCEDURE — 36415 COLL VENOUS BLD VENIPUNCTURE: CPT

## 2020-06-23 PROCEDURE — 85730 THROMBOPLASTIN TIME PARTIAL: CPT

## 2020-06-23 PROCEDURE — 87641 MR-STAPH DNA AMP PROBE: CPT

## 2020-06-23 PROCEDURE — 80048 BASIC METABOLIC PNL TOTAL CA: CPT

## 2020-06-23 PROCEDURE — 97161 PT EVAL LOW COMPLEX 20 MIN: CPT

## 2020-06-23 PROCEDURE — 83036 HEMOGLOBIN GLYCOSYLATED A1C: CPT

## 2020-06-23 NOTE — PERIOP NOTES
Lab results within anesthesia guidelines. MRSA swab result pending. All results routed/faxed to pt's PCP, Júnior Chun MD, per surgeon's order. Recent Results (from the past 12 hour(s))   CBC WITH AUTOMATED DIFF    Collection Time: 06/23/20  9:33 AM   Result Value Ref Range    WBC 5.8 4.3 - 11.1 K/uL    RBC 4.34 4.05 - 5.2 M/uL    HGB 13.7 11.7 - 15.4 g/dL    HCT 40.8 35.8 - 46.3 %    MCV 94.0 79.6 - 97.8 FL    MCH 31.6 26.1 - 32.9 PG    MCHC 33.6 31.4 - 35.0 g/dL    RDW 11.9 11.9 - 14.6 %    PLATELET 805 118 - 196 K/uL    MPV 12.2 9.4 - 12.3 FL    ABSOLUTE NRBC 0.00 0.0 - 0.2 K/uL    DF AUTOMATED      NEUTROPHILS 53 43 - 78 %    LYMPHOCYTES 35 13 - 44 %    MONOCYTES 8 4.0 - 12.0 %    EOSINOPHILS 3 0.5 - 7.8 %    BASOPHILS 1 0.0 - 2.0 %    IMMATURE GRANULOCYTES 0 0.0 - 5.0 %    ABS. NEUTROPHILS 3.0 1.7 - 8.2 K/UL    ABS. LYMPHOCYTES 2.0 0.5 - 4.6 K/UL    ABS. MONOCYTES 0.5 0.1 - 1.3 K/UL    ABS. EOSINOPHILS 0.2 0.0 - 0.8 K/UL    ABS. BASOPHILS 0.1 0.0 - 0.2 K/UL    ABS. IMM.  GRANS. 0.0 0.0 - 0.5 K/UL   HEMOGLOBIN A1C WITH EAG    Collection Time: 06/23/20  9:33 AM   Result Value Ref Range    Hemoglobin A1c 5.3 %    Est. average glucose 084 mg/dL   METABOLIC PANEL, BASIC    Collection Time: 06/23/20  9:33 AM   Result Value Ref Range    Sodium 141 136 - 145 mmol/L    Potassium 4.1 3.5 - 5.1 mmol/L    Chloride 107 98 - 107 mmol/L    CO2 26 21 - 32 mmol/L    Anion gap 8 7 - 16 mmol/L    Glucose 93 65 - 100 mg/dL    BUN 18 6 - 23 MG/DL    Creatinine 0.78 0.6 - 1.0 MG/DL    GFR est AA >60 >60 ml/min/1.73m2    GFR est non-AA >60 >60 ml/min/1.73m2    Calcium 9.2 8.3 - 10.4 MG/DL   PROTHROMBIN TIME + INR    Collection Time: 06/23/20  9:33 AM   Result Value Ref Range    Prothrombin time 12.1 12.0 - 14.7 sec    INR 0.9     PTT    Collection Time: 06/23/20  9:33 AM   Result Value Ref Range    aPTT 24.4 24.3 - 35.4 SEC   EKG, 12 LEAD, INITIAL    Collection Time: 06/23/20 10:02 AM   Result Value Ref Range Ventricular Rate 61 BPM    Atrial Rate 61 BPM    P-R Interval 124 ms    QRS Duration 66 ms    Q-T Interval 394 ms    QTC Calculation (Bezet) 396 ms    Calculated P Axis 25 degrees    Calculated R Axis 27 degrees    Calculated T Axis 33 degrees    Diagnosis       Normal sinus rhythm  Low voltage QRS  Cannot rule out Anterior infarct (cited on or before 19-SEP-2015)  Abnormal ECG  When compared with ECG of 19-SEP-2015 06:09,  Vent.  rate has decreased BY  32 BPM  Questionable change in initial forces of Anterior leads  Non-specific change in ST segment in Inferior leads

## 2020-06-23 NOTE — PROGRESS NOTES
[facility] Case Management note:  Patient screened in Prehab for discharge planning needs, demographics verified on face sheet . Patient scheduled for a future total joint replacement on 7/16/20. We discussed Home Health and equipment needed after surgery, pt confirmed that they do not feel that she needs and DME at this time. Pt states that she has a walker at home. List of Home Health providers offered. Patient w/o preference towards provider.   Initial referral sent to [facility]

## 2020-06-23 NOTE — ADVANCED PRACTICE NURSE
Total Joint Surgery Preoperative Chart Review      Patient ID:  Tim Farah  [id number]  13 y.o.  1960  Surgeon: Dr. Med Valdez  Date of Surgery: 7/16/2020  Procedure: Total Left Knee Arthroplasty  Primary Care Physician: Jay Leyva MD None  Specialty Physician(s):      Subjective:   Tim Farah is a 64 y.o. WHITE OR  female who presents for preoperative evaluation for Total Left Knee arthroplasty. This is a preoperative chart review note based on data collected by the nurse at the surgical Pre-Assessment visit.     Past Medical History:   Diagnosis Date    Abdominal pain, epigastric     Abdominal pain, periumbilic     Absence of menstruation     Acute bronchitis 1/16/2015    Acute pancreatitis     Acute pharyngitis     Adverse effect of anesthesia     HTN after anesthesia (not hypo)     Anxiety     hx of anxiety/ situational  per pt- no longer on meds    Anxiety state, unspecified     Backache, unspecified 1/16/2015    Bronchitis, not specified as acute or chronic     Cervicalgia     Chest pain 11/17/2015    Chondromalacia of patella     Chronic maxillary sinusitis     Chronic UTI     managed with medication    Contusion of toe     Cough     Disturbance of skin sensation     Dyspepsia and other specified disorders of function of stomach     Esophageal reflux 1/16/2015    GERD (gastroesophageal reflux disease)     well controlled with meds    Hypersomnia due to medical condition 12/1/2014    Hypothyroidism 12/1/2014    Insomnia, unspecified     Irregular menstrual cycle 1/16/2015    Irritability     Lateral epicondylitis  of elbow     Lumbago     Migraine, unspecified, without mention of intractable migraine without mention of status migrainosus     Mixed hyperlipidemia 1/16/2015    Open wound of lip, without mention of complication     Open wound(s) (multiple) of unspecified site(s), without mention of complication     Other acute reactions to stress     Other and unspecified complications of medical care, not elsewhere classified     Other and unspecified hyperlipidemia     Other and unspecified mycoses     Other and unspecified noninfectious gastroenteritis and colitis(558.9) 1/16/2015    Other benign neoplasm of connective and other soft tissue of trunk, unspecified 1/16/2015    Other dysfunctions of sleep stages or arousal from sleep     Other joint derangement, not elsewhere classified, lower leg 1/16/2015    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)     Other specified symptom associated with female genital organs     Pain in joint, lower leg     Pain in joint, pelvic region and thigh     Pain in limb     Palpitations 11/17/2015    Persistent disorder of initiating or maintaining sleep 12/1/2014    Pneumonia, organism unspecified(486)     PONV (postoperative nausea and vomiting)     will usually have to stay in PACU a few hours due to N/V    Postmenopausal atrophic vaginitis     Prolonged depressive reaction     Pure hypercholesterolemia 1/16/2015    Rosacea 1/16/2015    Sacroiliitis, not elsewhere classified ([facility] [address]     Snores     states has not had sleep study for Dx of Sleep apnea    Spasm of muscle     Spondylosis of unspecified site without mention of myelopathy 1/16/2015    Sprain of ankle, unspecified site     Sprain of lumbar region     Symptomatic menopausal or female climacteric states     Synovial cyst of popliteal space 1/16/2015    Thoracic or lumbosacral neuritis or radiculitis, unspecified     Unspecified dermatitis due to sun 1/16/2015    Unspecified hypothyroidism 1/16/2015    Unspecified menopausal and postmenopausal disorder     Unspecified monoarthritis, ankle and foot     Unspecified pruritic disorder     Unspecified viral infection, in conditions classified elsewhere and of unspecified site     Urinary frequency       Past Surgical History:   Procedure Laterality Date    HX CERVICAL FUSION 2019    ANTERIOR CERVICAL DISCECTOMY Tim Benitez- Dr Yessenia Greenberg    HX  SECTION      x1    HX COLONOSCOPY      [facility]    HX HEART CATHETERIZATION      no blockages per pt- \"false positive stress test\"     HX LAP CHOLECYSTECTOMY      Dr Luz Elena Sanchez HX LUMBAR LAMINECTOMY      no fusion/hardware    HX TONSILLECTOMY       Family History   Problem Relation Age of Onset    Diabetes Mother    Katya Speck Bladder Disease Mother     Hypertension Mother     Stroke Maternal Grandmother       Social History     Tobacco Use    Smoking status: Former Smoker     Packs/day: 0.75     Years: 15.00     Pack years: 11.25     Last attempt to quit: 2010     Years since quitting: 10.1    Smokeless tobacco: Never Used   Substance Use Topics    Alcohol use: Yes     Alcohol/week: 5.0 standard drinks     Types: 3 Glasses of wine, 2 Standard drinks or equivalent per week       Prior to Admission medications    Medication Sig Start Date End Date Taking? Authorizing Provider   pantoprazole (PROTONIX) 40 mg tablet Take 40 mg by mouth two (2) times a day. Yes Provider, Historical   nitrofurantoin (MACRODANTIN) 100 mg capsule Take 100 mg by mouth daily. Yes Provider, Historical   celecoxib (CELEBREX) 200 mg capsule Take 1 Cap by mouth two (2) times a day. Patient taking differently: Take 200 mg by mouth nightly. 2/3/16  Yes Edna Wu MD   estrogens, conjugated,-methylTESTOSTERone (ESTRATEST HS) 0.625-1.25 mg per tablet Take 1 Tab by mouth daily. Max Daily Amount: 1 Tab. For vasomotor symptoms assoc with menopause 2/3/16  Yes Edna Wu MD   levothyroxine (SYNTHROID) 150 mcg tablet Take 1 Tab by mouth Daily (before breakfast). Name Brand ONLY  Patient taking differently: Take 150 mcg by mouth every other day.  Name Brand ONLY 16  Yes Edna Wu MD   medroxyPROGESTERone (PROVERA) 2.5 mg tablet TAKE 1 TABLET DAILY 11/4/15  Yes Edna Wu MD   fluticasone [facility]) 50 mcg/actuation nasal spray 2 sprays ea. Side daily  Patient taking differently: 2 Sprays by Both Nostrils route as needed. 2 sprays ea. Side daily 4/30/15  Yes Jeffrey Lamar MD   levothyroxine (SYNTHROID) 175 mcg tablet Take 1 Tab by mouth Daily (before breakfast). Branded Synthroid only: 175mcg on odd days and 150 on even days  Patient taking differently: Take 175 mcg by mouth every other day. Branded Synthroid only: 175mcg on odd days and 150 on even days 4/30/15  Yes Jeffrey Lamar MD   promethazine (PHENERGAN) 25 mg tablet Take 1 Tab by mouth every six (6) hours as needed for Nausea. 9/19/15   Nenita Leal MD   clonazePAM (KLONOPIN) 1 mg tablet Take 1 Tab by mouth daily. Max Daily Amount: 1 mg. Patient taking differently: Take 1 mg by mouth as needed. 4/30/15   Jeffrey Lamar MD   metroNIDAZOLE (METROGEL) 1 % topical gel Apply twice a day  Patient taking differently: Apply  to affected area as needed. Apply twice a day 4/30/15   Jeffrey Lamar MD     Allergies   Allergen Reactions    Etodolac Unknown (comments)     Mood Swings. Objective:     Physical Exam:   Patient Vitals for the past 24 hrs:   Temp Pulse Resp BP SpO2   06/23/20 1037 98.1 °F (36.7 °C) 76 12 128/85 97 %       ECG:    EKG Results     Procedure 720 Value Units Date/Time    EKG, 12 LEAD, INITIAL [[id number]] Collected:  06/23/20 1002    Order Status:  Completed Updated:  06/23/20 1102     Ventricular Rate 61 BPM      Atrial Rate 61 BPM      P-R Interval 124 ms      QRS Duration 66 ms      Q-T Interval 394 ms      QTC Calculation (Bezet) 396 ms      Calculated P Axis 25 degrees      Calculated R Axis 27 degrees      Calculated T Axis 33 degrees      Diagnosis --     Normal sinus rhythm  Low voltage QRS  Cannot rule out Anterior infarct (cited on or before 19-SEP-2015)  Abnormal ECG  When compared with ECG of 19-SEP-2015 06:09,  Vent.  rate has decreased BY  32 BPM  Questionable change in initial forces of Anterior leads  Non-specific change in ST segment in Inferior leads            Data Review:   Labs:   Recent Results (from the past 24 hour(s))   CBC WITH AUTOMATED DIFF    Collection Time: 06/23/20  9:33 AM   Result Value Ref Range    WBC 5.8 4.3 - 11.1 K/uL    RBC 4.34 4.05 - 5.2 M/uL    HGB 13.7 11.7 - 15.4 g/dL    HCT 40.8 35.8 - 46.3 %    MCV 94.0 79.6 - 97.8 FL    MCH 31.6 26.1 - 32.9 PG    MCHC 33.6 31.4 - 35.0 g/dL    RDW 11.9 11.9 - 14.6 %    PLATELET 830 560 - 819 K/uL    MPV 12.2 9.4 - 12.3 FL    ABSOLUTE NRBC 0.00 0.0 - 0.2 K/uL    DF AUTOMATED      NEUTROPHILS 53 43 - 78 %    LYMPHOCYTES 35 13 - 44 %    MONOCYTES 8 4.0 - 12.0 %    EOSINOPHILS 3 0.5 - 7.8 %    BASOPHILS 1 0.0 - 2.0 %    IMMATURE GRANULOCYTES 0 0.0 - 5.0 %    ABS. NEUTROPHILS 3.0 1.7 - 8.2 K/UL    ABS. LYMPHOCYTES 2.0 0.5 - 4.6 K/UL    ABS. MONOCYTES 0.5 0.1 - 1.3 K/UL    ABS. EOSINOPHILS 0.2 0.0 - 0.8 K/UL    ABS. BASOPHILS 0.1 0.0 - 0.2 K/UL    ABS. IMM.  GRANS. 0.0 0.0 - 0.5 K/UL   HEMOGLOBIN A1C WITH EAG    Collection Time: 06/23/20  9:33 AM   Result Value Ref Range    Hemoglobin A1c 5.3 %    Est. average glucose 520 mg/dL   METABOLIC PANEL, BASIC    Collection Time: 06/23/20  9:33 AM   Result Value Ref Range    Sodium 141 136 - 145 mmol/L    Potassium 4.1 3.5 - 5.1 mmol/L    Chloride 107 98 - 107 mmol/L    CO2 26 21 - 32 mmol/L    Anion gap 8 7 - 16 mmol/L    Glucose 93 65 - 100 mg/dL    BUN 18 6 - 23 MG/DL    Creatinine 0.78 0.6 - 1.0 MG/DL    GFR est AA >60 >60 ml/min/1.73m2    GFR est non-AA >60 >60 ml/min/1.73m2    Calcium 9.2 8.3 - 10.4 MG/DL   EKG, 12 LEAD, INITIAL    Collection Time: 06/23/20 10:02 AM   Result Value Ref Range    Ventricular Rate 61 BPM    Atrial Rate 61 BPM    P-R Interval 124 ms    QRS Duration 66 ms    Q-T Interval 394 ms    QTC Calculation (Bezet) 396 ms    Calculated P Axis 25 degrees    Calculated R Axis 27 degrees    Calculated T Axis 33 degrees    Diagnosis       Normal sinus rhythm  Low voltage QRS  Cannot rule out Anterior infarct (cited on or before 19-SEP-2015)  Abnormal ECG  When compared with ECG of 19-SEP-2015 06:09,  Vent. rate has decreased BY  32 BPM  Questionable change in initial forces of Anterior leads  Non-specific change in ST segment in Inferior leads           Problem List:  )  Patient Active Problem List   Diagnosis Code    Snoring R06.83    Hypersomnia due to medical condition G47.14    Hypothyroidism E03.9    Persistent disorder of initiating or maintaining sleep G47.00    Other benign neoplasm of connective and other soft tissue of trunk, unspecified D21.6    Other joint derangement, not elsewhere classified, lower leg M23.8X9    Osteoarthritis of spine M47.9    Other and unspecified noninfectious gastroenteritis and colitis(558.9) K52.9    Unspecified dermatitis due to sun L57.8    Acute bronchitis J20.9    Irregular menstrual cycle N92.6    Rosacea L71.9    Esophageal reflux K21.9    Unspecified hypothyroidism E03.9    Synovial cyst of popliteal space M71.20    Pure hypercholesterolemia E78.00    Backache, unspecified M54.9    Mixed hyperlipidemia E78.2    Palpitations R00.2    Chest pain R07.9    Cervical spinal stenosis M48.02    Stenosis of cervical spine M48.02       Total Joint Surgery Pre-Assessment Recommendations:           He/she is a moderate risk for sleep apnea but is not interested in additional work up at this time. Will initiate perioperative PABLO precautions. Recommend continuous saturation monitoring hours of sleep, during hospitalization.           Signed By: EDIE Candelaria    June 23, 2020

## 2020-06-23 NOTE — PROGRESS NOTES
20 0900   Oxygen Therapy   O2 Sat (%) 98 %   Pulse via Oximetry 63 beats per minute   O2 Device Room air   Pre-Treatment   Breath Sounds Bilateral Clear   Pre FEV1 (liters) 2.1 liters   % Predicted 90   Incentive Spirometry Treatment   Actual Volume (ml) 3000 ml     Initial respiratory Assessment completed with pt. Pt was interviewed and evaluated in Joint camp prior to surgery. Patient ID:  Alicia Felton  [id number]  75 y.o.  1960  Surgeon: Dr. Lonnie Rosario  Date of Surgery: The linked surgery was not found. Please check manually. Procedure: Total Left Knee Arthroplasty  Primary Care Physician: Jayson Saleh MD None  Specialists:                      Pt instructed in the use of Incentive Spirometry. Pt instructed to bring Incentive Spirometer back on date of surgery & to start using Is upon return to pt room. Written instructions given to patient.   Pt taught proper cough technique    History of smokin PPD FOR 12 YEARS                 Quit date:       2010  Secondhand smoke:DENIES    Past procedures with Oxygen desaturation or delayed awakening:DENIES    Past Medical History:   Diagnosis Date    Abdominal pain, epigastric     Abdominal pain, periumbilic     Absence of menstruation     Acute bronchitis 2015    Acute pancreatitis     Acute pharyngitis     Adverse effect of anesthesia     HTN after anesthesia (not hypo)     Anxiety     hx of anxiety/ situational  per pt- no longer on meds    Anxiety state, unspecified     Backache, unspecified 2015    Bronchitis, not specified as acute or chronic     Cervicalgia     Chest pain 2015    Chondromalacia of patella     Chronic maxillary sinusitis     Chronic UTI     managed with medication    Contusion of toe     Cough     Disturbance of skin sensation     Dyspepsia and other specified disorders of function of stomach     Esophageal reflux 2015    GERD (gastroesophageal reflux disease)     well controlled with meds    Hypersomnia due to medical condition 12/1/2014    Hypothyroidism 12/1/2014    Insomnia, unspecified     Irregular menstrual cycle 1/16/2015    Irritability     Lateral epicondylitis  of elbow     Lumbago     Migraine, unspecified, without mention of intractable migraine without mention of status migrainosus     Mixed hyperlipidemia 1/16/2015    Open wound of lip, without mention of complication     Open wound(s) (multiple) of unspecified site(s), without mention of complication     Other acute reactions to stress     Other and unspecified complications of medical care, not elsewhere classified     Other and unspecified hyperlipidemia     Other and unspecified mycoses     Other and unspecified noninfectious gastroenteritis and colitis(558.9) 1/16/2015    Other benign neoplasm of connective and other soft tissue of trunk, unspecified 1/16/2015    Other dysfunctions of sleep stages or arousal from sleep     Other joint derangement, not elsewhere classified, lower leg 1/16/2015    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)     Other specified symptom associated with female genital organs     Pain in joint, lower leg     Pain in joint, pelvic region and thigh     Pain in limb     Palpitations 11/17/2015    Persistent disorder of initiating or maintaining sleep 12/1/2014    Pneumonia, organism unspecified(486)     PONV (postoperative nausea and vomiting)     will usually have to stay in PACU a few hours due to N/V    Postmenopausal atrophic vaginitis     Prolonged depressive reaction     Pure hypercholesterolemia 1/16/2015    Rosacea 1/16/2015    Sacroiliitis, not elsewhere classified ([facility] [address]     Snores     states has not had sleep study for Dx of Sleep apnea    Spasm of muscle     Spondylosis of unspecified site without mention of myelopathy 1/16/2015    Sprain of ankle, unspecified site     Sprain of lumbar region     Symptomatic menopausal or female climacteric states  Synovial cyst of popliteal space 1/16/2015    Thoracic or lumbosacral neuritis or radiculitis, unspecified     Unspecified dermatitis due to sun 1/16/2015    Unspecified hypothyroidism 1/16/2015    Unspecified menopausal and postmenopausal disorder     Unspecified monoarthritis, ankle and foot     Unspecified pruritic disorder     Unspecified viral infection, in conditions classified elsewhere and of unspecified site     Urinary frequency         Respiratory history:DENIES SOB                                                                  Respiratory meds:  DENIES    FAMILY PRESENT:             NO                                                   PAST SLEEP STUDY:                       DENIES  HX OF PABLO:                     NOTES IN PT'S MEDICAL RECORD BY DR Brisa Dumont  12/1/2014. HST WAS ORDERED. PT   DENIES ANY RECOLLECTION OF THIS ENCONTER  PABLO assessment:                                               SLEEPS ON SIDE                                                   PHYSICAL EXAM   Body mass index is 32.53 kg/m².    Visit Vitals  /85 (BP 1 Location: Left arm, BP Patient Position: At rest;Sitting)   Pulse 76   Temp 98.1 °F (36.7 °C)   Resp 12   Ht 5' (1.524 m)   Wt 75.6 kg (166 lb 9 oz)   SpO2 97%   BMI 32.53 kg/m²     Neck circumference:   38   cm    Loud snoring:                                                 YES             Witnessed apnea or wakening gasping or choking:        DENIES     WAKES SELF UP SNORING  Awakens with headaches:                                               DENIES  Morning or daytime tiredness/ sleepiness:                      DENIES          Dry mouth or sore throat in morning:                    DENIES                                   Hunt stage: 3-4                                    SACS score:4  Stop Bang   STOP-BANG  Does the patient snore loudly (louder than talking or loud enough to be heard through closed doors)?: Yes  Does the patient often feel tired, fatigued, or sleepy during the daytime, even after a \"good\" night's sleep?: Yes  Has anyone ever observed the patient stop breathing during their sleep? : No  Does the patient have or are they being treated for high blood pressure?: No  Is the patient's BMI greater than 35?: No  Is your neck circumference greater than 17 inches (Male) or 16 inches (Female)?: No  Is the patient older than 48?: Yes  Is the patient male?: No  PABLO Score: 3  Has the patient been referred to Sleep Medicine?: No  Has the patient previously been diagnosed with Obstructive Sleep Apnea?: No                            CS                                           Referrals:    Pt. Phone Number:

## 2020-06-23 NOTE — PERIOP NOTES
Patient verified name and . Order for consent found in EHR and matches case posting; patient verified. Type 3 surgery, joint PAT assessment complete. Labs per surgeon: CBC, BMP, PT/PTT, Hgba1c- results pending  Labs per anesthesia protocol: no additional  EKG: done today- result needs anesthesia review; previous EKG done 9/19/15 found in EMR for anesthesia reference    Patient informed a Covid swab is required 7 days prior to surgery. [facility] [address] [place]. For questions or concerns the patient is advised to call [phone]. An appointment is required and the testing clinic is closed from 12-1 for lunch and on weekends. Appointment date/time NOT found in Jaz Rodriguez, PAT supervisor, notified of need for appt- appt made 2020 at 1000- information provided to pt and pt verbalizes understanding. MRSA/MSSA swab collected; pharmacy to review and dose antibiotic as appropriate. Hospital approved surgical skin cleanser and instructions to return bottle on DOS given per hospital policy. Patient provided with handouts including Guide to Surgery, Pain Management, Hand Hygiene, Blood Transfusion Education, and [facility] Brochure. Patient answered medical/surgical history questions at their best of ability. All prior to admission medications documented in [facility]. Original medication prescription bottles NOT visualized during patient appointment. Patient instructed to hold all vitamins 3 weeks prior to surgery and NSAIDS 5 days prior to surgery. Patient teach back successful and patient demonstrates knowledge of instruction.

## 2020-06-23 NOTE — PROGRESS NOTES
Yuliana Espinal  : (88 y.o.) Joint Ma Job at [address], [facility].  [phone]       Physical Therapy Prehab Plan of Treatment and Evaluation Summary:2020    ICD-10: Treatment Diagnosis:   · Pain in Left Knee (M25.562)  · Stiffness of Left Knee, Not elsewhere classified (M25.662)  · Difficulty in walking, Not elsewhere classified (R26.2)  Precautions/Allergies:   Etodolac  MEDICAL/REFERRING DIAGNOSIS:  Unilateral primary osteoarthritis, left knee [M17.12]  REFERRING PHYSICIAN: Francesca Avalos MD  DATE OF SURGERY: 20    Assessment:   Comments:  Patient presents prior to L TKA. She reports pain with her R knee too but not as bad as L. She reports she has been trying to keep her legs moving to avoid losing strength. She will d/c home with assist from her spouse. PROBLEM LIST (Impacting functional limitations):  Ms. John Mayfield presents with the following left lower extremity(s) problems:  1. Gait  2. Strength  3. Range of Motion  4. Home Exercise Program  5. Pain   INTERVENTIONS PLANNED:  1. Home Exercise Program  2. Educational Discussion      TREATMENT PLAN: Effective Dates: 2020 TO 2020. Frequency/Duration: Patient to continue to perform home exercise program at least twice per day up until her surgery. GOALS: (Goals have been discussed and agreed upon with patient.)  Discharge Goals: Time Frame: 1 Day  1. Patient will demonstrate independence with a home exercise program designed to increase strength, range of motion, balance, coordination, functional technique and pain control to minimize functional deficits and optimize patient for total joint replacement. Rehabilitation Potential For Stated Goals: Good  Regarding Yuliana Espinal's therapy, I certify that the treatment plan above will be carried out by a therapist or under their direction.   Thank you for this referral,  Mac Marinelli, PT HISTORY:   Present Symptoms:  Pain Intensity 1: 9   History of Present Injury/Illness (Reason for Referral):  Medical/Referring Diagnosis: Unilateral primary osteoarthritis, left knee [M17.12]   Past Medical History/Comorbidities:   Ms. Sotero Turcios  has a past medical history of Abdominal pain, epigastric, Abdominal pain, periumbilic, Absence of menstruation, Acute bronchitis (1/16/2015), Acute pancreatitis, Acute pharyngitis, Anxiety, Anxiety state, unspecified, Backache, unspecified (1/16/2015), Bronchitis, not specified as acute or chronic, Cervicalgia, Chest pain (11/17/2015), Chondromalacia of patella, Chronic maxillary sinusitis, Contusion of toe, Cough, Disturbance of skin sensation, Dyspepsia and other specified disorders of function of stomach, Esophageal reflux (1/16/2015), GERD (gastroesophageal reflux disease), Hypersomnia due to medical condition (12/1/2014), Hypothyroidism (12/1/2014), Insomnia, unspecified, Irregular menstrual cycle (1/16/2015), Irritability, Lateral epicondylitis  of elbow, Lumbago, Migraine, unspecified, without mention of intractable migraine without mention of status migrainosus, Mixed hyperlipidemia (1/16/2015), Open wound of lip, without mention of complication, Open wound(s) (multiple) of unspecified site(s), without mention of complication, Other acute reactions to stress, Other and unspecified complications of medical care, not elsewhere classified, Other and unspecified hyperlipidemia, Other and unspecified mycoses, Other and unspecified noninfectious gastroenteritis and colitis(558.9) (1/16/2015), Other benign neoplasm of connective and other soft tissue of trunk, unspecified (1/16/2015), Other dysfunctions of sleep stages or arousal from sleep, Other joint derangement, not elsewhere classified, lower leg (1/16/2015), 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), Other specified symptom associated with female genital organs, Pain in joint, lower leg, Pain in joint, pelvic region and thigh, Pain in limb, Palpitations (2015), Persistent disorder of initiating or maintaining sleep (2014), Pneumonia, organism unspecified(486), PONV (postoperative nausea and vomiting), Postmenopausal atrophic vaginitis, Prolonged depressive reaction, Pure hypercholesterolemia (2015), Rosacea (2015), Sacroiliitis, not elsewhere classified ([facility] [address] Snores, Spasm of muscle, Spondylosis of unspecified site without mention of myelopathy (2015), Sprain of ankle, unspecified site, Sprain of lumbar region, Symptomatic menopausal or female climacteric states, Synovial cyst of popliteal space (2015), Thoracic or lumbosacral neuritis or radiculitis, unspecified, Unspecified dermatitis due to sun (2015), Unspecified hypothyroidism (2015), Unspecified menopausal and postmenopausal disorder, Unspecified monoarthritis, ankle and foot, Unspecified pruritic disorder, Unspecified viral infection, in conditions classified elsewhere and of unspecified site, and Urinary frequency. Ms. Lolly Thompson  has a past surgical history that includes hx colonoscopy (); hx heart catheterization (); hx lap cholecystectomy (); hx lumbar laminectomy; hx tonsillectomy; hx  section; and hx cervical fusion (2019). Social History/Living Environment:   Home Environment: Private residence  # Steps to Enter: 4  Rails to Enter: No  One/Two Story Residence: Two story, live on 1st floor  # of Interior Steps: 12  Living Alone: No  Support Systems: Spouse/Significant Other/Partner  Patient Expects to be Discharged to[de-identified] Private residence  Current DME Used/Available at Home: Cane, straight  Tub or Shower Type: Tub/Shower combination  Work/Activity:  Uses cane; retired.   Dominant Side:  RIGHT  Current Medications:  See Pre-assessment nursing note   Number of Personal Factors/Comorbidities that affect the Plan of Care: 0: LOW COMPLEXITY   EXAMINATION:   ADLs (Current Functional Status):   Ambulation:  [] Independent  [] Walk Indoors Only  [] Walk Outdoors  [x] Use Assistive Device  [] Use Wheelchair Only Dressing:  [x] Independent  Requires Assistance from Someone for:  [] Sock/Shoes  [] Pants  [] Everything   Bathing/Showering:   [x] Independent  [] Requires Assistance from Someone  [] Sponge Bath Only Household Activities:  [] Routine house and yard work  [x] Light Housework Only  [] None   Observation/Orthostatic Postural Assessment:       ROM/Flexibility:   Gross Assessment: Yes  AROM: Within functional limits(R LE)                LLE AROM  L Knee Flexion: 112  L Knee Extension: 6          Strength:   Gross Assessment: Yes  Strength: Within functional limits(R LE)       LLE Strength  L Hip Flexion: 5  L Knee Flexion: 5  L Knee Extension: 5          Functional Mobility:    Gross Assessment: Yes  Coordination: Within functional limits    Stand to Sit: Independent  Sit to Stand: Independent  Distance (ft): 250 Feet (ft)  Ambulation - Level of Assistance: Independent  Assistive Device: (reports using cane but did not have at appointment)  Stance: Left decreased  Gait Abnormalities: Antalgic          Balance:    Sitting: Intact  Standing: Intact   Body Structures Involved:  1. Joints  2. Muscles Body Functions Affected:  1. Movement Related Activities and Participation Affected:  1. Mobility   Number of elements that affect the Plan of Care: 4+: HIGH COMPLEXITY   CLINICAL PRESENTATION:   Presentation: Stable and uncomplicated: LOW COMPLEXITY   CLINICAL DECISION MAKING:   Outcome Measure: Tool Used: Lower Extremity Functional Scale (LEFS)  Score:  Initial: 14/80 Most Recent: X/80 (Date: -- )   Interpretation of Score: 20 questions each scored on a 5 point scale with 0 representing \"extreme difficulty or unable to perform\" and 4 representing \"no difficulty\". The lower the score, the greater the functional disability. 80/80 represents no disability. Minimal detectable change is 9 points.     Medical Necessity: · Ms. Vinicius Cardona is expected to optimize her lower extremity strength and ROM in preparation for joint replacement surgery. Reason for Services/Other Comments:  · Achieve baseline assesment of musculoskeletal system, functional mobility and home environment. , educate in PT HEP in preparation for surgery, educate in hospital plan of care. Use of outcome tool(s) and clinical judgement create a POC that gives a: Clear prediction of patient's progress: LOW COMPLEXITY   TREATMENT:   Treatment/Session Assessment:  Patient was instructed in PT- HEP to increase strength and ROM in LEs. Answered all questions. · Post session pain:  9/10  · Compliance with Program/Exercises: Will assess as treatment progresses.   Total Treatment Duration:  PT Patient Time In/Time Out  Time In: 0930  Time Out: 0945    Benito Rojo PT

## 2020-06-23 NOTE — PERIOP NOTES
PLEASE CONTINUE TAKING ALL PRESCRIPTION MEDICATIONS UP TO THE DAY OF SURGERY UNLESS OTHERWISE DIRECTED BELOW. DISCONTINUE all vitamins and supplements 21 days prior to surgery. DISCONTINUE Non-Steriodal Anti-Inflammatory (NSAIDS) such as Advil and Aleve 5 days prior to surgery. Home Medications to take  the day of surgery   Pantoprazole, medroxyprogesterone, estrogen-methyltesterone,    nitrofurantoin         Home Medications   to Hold   HOLD: tylenol AM of surgery         Comments   BRING: synthroid (brand only), estrogen-methyltesterone in original Rx bottles      *Visitor policy of 1 visitor per patient discussed. Please do not bring home medications with you on the day of surgery unless otherwise directed by your nurse. If you are instructed to bring home medications, please give them to your nurse as they will be administered by the nursing staff. If you have any questions, please call [facility] Jaz Fish [phone] or [address] [place] [address] [phone]. A copy of this note was provided to the patient for reference.

## 2020-07-10 NOTE — H&P
H&P    Patient ID:  Milagros Suarez  [id number]  22 y.o.  1960  Surgeon:  Stan Morales MD  Date of Surgery: * No surgery date entered *  Procedure: Left Knee Total Arthroplasty  Primary Care Physician: Prashanth Merida MD        Subjective:  Milagros Suarez is a 64 y.o. WHITE OR  female who presents with Left Knee pain. She has history of Left Knee pain for several months. Symptoms worse with walking long distances and relieved with rest. Conservative treatment consisting of  activity modification and injections have not helped. The patient lives with their family. The patients goal after surgery is improved pain and function.         Past Medical History:   Diagnosis Date    Abdominal pain, epigastric     Abdominal pain, periumbilic     Absence of menstruation     Acute bronchitis 1/16/2015    Acute pancreatitis     Acute pharyngitis     Adverse effect of anesthesia     HTN after anesthesia (not hypo)     Anxiety     hx of anxiety/ situational  per pt- no longer on meds    Anxiety state, unspecified     Backache, unspecified 1/16/2015    Bronchitis, not specified as acute or chronic     Cervicalgia     Chest pain 11/17/2015    Chondromalacia of patella     Chronic maxillary sinusitis     Chronic UTI     managed with medication    Contusion of toe     Cough     Disturbance of skin sensation     Dyspepsia and other specified disorders of function of stomach     Esophageal reflux 1/16/2015    GERD (gastroesophageal reflux disease)     well controlled with meds    Hypersomnia due to medical condition 12/1/2014    Hypothyroidism 12/1/2014    Insomnia, unspecified     Irregular menstrual cycle 1/16/2015    Irritability     Lateral epicondylitis  of elbow     Lumbago     Migraine, unspecified, without mention of intractable migraine without mention of status migrainosus     Mixed hyperlipidemia 1/16/2015    Open wound of lip, without mention of complication     Open wound(s) (multiple) of unspecified site(s), without mention of complication     Other acute reactions to stress     Other and unspecified complications of medical care, not elsewhere classified     Other and unspecified hyperlipidemia     Other and unspecified mycoses     Other and unspecified noninfectious gastroenteritis and colitis(558.9) 1/16/2015    Other benign neoplasm of connective and other soft tissue of trunk, unspecified 1/16/2015    Other dysfunctions of sleep stages or arousal from sleep     Other joint derangement, not elsewhere classified, lower leg 1/16/2015    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)     Other specified symptom associated with female genital organs     Pain in joint, lower leg     Pain in joint, pelvic region and thigh     Pain in limb     Palpitations 11/17/2015    Persistent disorder of initiating or maintaining sleep 12/1/2014    Pneumonia, organism unspecified(486)     PONV (postoperative nausea and vomiting)     will usually have to stay in PACU a few hours due to N/V    Postmenopausal atrophic vaginitis     Prolonged depressive reaction     Pure hypercholesterolemia 1/16/2015    Rosacea 1/16/2015    Sacroiliitis, not elsewhere classified ([facility] [address]     Snores     states has not had sleep study for Dx of Sleep apnea    Spasm of muscle     Spondylosis of unspecified site without mention of myelopathy 1/16/2015    Sprain of ankle, unspecified site     Sprain of lumbar region     Symptomatic menopausal or female climacteric states     Synovial cyst of popliteal space 1/16/2015    Thoracic or lumbosacral neuritis or radiculitis, unspecified     Unspecified dermatitis due to sun 1/16/2015    Unspecified hypothyroidism 1/16/2015    Unspecified menopausal and postmenopausal disorder     Unspecified monoarthritis, ankle and foot     Unspecified pruritic disorder     Unspecified viral infection, in conditions classified elsewhere and of unspecified site     Urinary frequency       Past Surgical History:   Procedure Laterality Date    HX CERVICAL FUSION  03/12/2019    ANTERIOR CERVICAL DISCECTOMY [facility]- Dr Guerrero Courser    HX Lashonda Villalobos      x1    HX COLONOSCOPY  2012    E[place] & [place]    HX HEART CATHETERIZATION  2013    no blockages per pt- \"false positive stress test\"     HX LAP CHOLECYSTECTOMY  2000    Dr Roxanne Fair HX LUMBAR LAMINECTOMY      no fusion/hardware    HX TONSILLECTOMY       Family History   Problem Relation Age of Onset    Diabetes Mother    Mary Ore Bladder Disease Mother     Hypertension Mother     Stroke Maternal Grandmother       Social History     Tobacco Use    Smoking status: Former Smoker     Packs/day: 0.75     Years: 15.00     Pack years: 11.25     Last attempt to quit: 4/21/2010     Years since quitting: 10.2    Smokeless tobacco: Never Used   Substance Use Topics    Alcohol use: Yes     Alcohol/week: 5.0 standard drinks     Types: 3 Glasses of wine, 2 Standard drinks or equivalent per week       Prior to Admission medications    Medication Sig Start Date End Date Taking? Authorizing Provider   pantoprazole (PROTONIX) 40 mg tablet Take 40 mg by mouth two (2) times a day. Provider, Historical   nitrofurantoin (MACRODANTIN) 100 mg capsule Take 100 mg by mouth daily. Provider, Historical   celecoxib (CELEBREX) 200 mg capsule Take 1 Cap by mouth two (2) times a day. Patient taking differently: Take 200 mg by mouth nightly. 2/3/16   Horacio Epley, MD   estrogens, conjugated,-methylTESTOSTERone (ESTRATEST HS) 0.625-1.25 mg per tablet Take 1 Tab by mouth daily. Max Daily Amount: 1 Tab. For vasomotor symptoms assoc with menopause 2/3/16   Horacio Epley, MD   levothyroxine (SYNTHROID) 150 mcg tablet Take 1 Tab by mouth Daily (before breakfast). Name Brand ONLY  Patient taking differently: Take 150 mcg by mouth every other day.  Name Brian Meth 1/12/16   Horacio Epley, MD   medroxyPROGESTERone (PROVERA) 2.5 mg tablet TAKE 1 TABLET DAILY 11/4/15   Hakeem Ho MD   promethazine (PHENERGAN) 25 mg tablet Take 1 Tab by mouth every six (6) hours as needed for Nausea. 9/19/15   Patricia Santos MD   fluticasone (FLONASE) 50 mcg/actuation nasal spray 2 sprays ea. Side daily  Patient taking differently: 2 Sprays by Both Nostrils route as needed. 2 sprays ea. Side daily 4/30/15   Hakeem Ho MD   clonazePAM Enid Darrell) 1 mg tablet Take 1 Tab by mouth daily. Max Daily Amount: 1 mg. Patient taking differently: Take 1 mg by mouth as needed. 4/30/15   Hakeem Ho MD   metroNIDAZOLE (METROGEL) 1 % topical gel Apply twice a day  Patient taking differently: Apply  to affected area as needed. Apply twice a day 4/30/15   Hakeem Ho MD   levothyroxine (SYNTHROID) 175 mcg tablet Take 1 Tab by mouth Daily (before breakfast). Branded Synthroid only: 175mcg on odd days and 150 on even days  Patient taking differently: Take 175 mcg by mouth every other day. Branded Synthroid only: 175mcg on odd days and 150 on even days 4/30/15   Hakeem Ho MD     Allergies   Allergen Reactions    Etodolac Unknown (comments)     Mood Swings. REVIEW OF SYSTEMS:  CONSTITUTIONAL: Denies fever, decreased appetite, weight loss/gain, night sweats or fatigue. HEENT: Denies vision or hearing changes. denies glasses. denies hearing aids. CARDIAC: Denies CP, palpitations, rheumatic fever, murmur, peripheral edema, carotid artery disease or syncopal episodes. RESPIRATORY: Denies dyspnea on exertion, asthma, COPD or orthopnea. GI: Denies GERD, history of GI bleed or melena, PUD, hepatitis or cirrhosis. : Denies dysuria, hematuria. denies BPH symptoms. HEMATOLOGIC: Denies anemia or blood disorders. ENDOCRINE: Denies thyroid disease. MUSCULOSKELETAL: See HPI. NEUROLOGIC: Denies seizure, peripheral neuropathy or memory loss. PSYCH: Denies depression, anxiety or insomnia. SKIN: Denies rash or open sores.      Objective:    PHYSICAL EXAM  GENERAL: No data found. EYES: PERRL. EOM intact. MOUTH:Teeth and Gums normal. NECK: Full ROM. Trachea midline. No thyromegaly or JVD. CARDIOVASCULAR: Regular rate and rhythm. No murmur or gallops. No carotid bruits. Peripheral pulses: radial 2 +, PT 2+, DP 2+ bilaterally. LUNGS: CTA bilaterally. No wheezes, rhonchi or rales. GI: positive BS. Abdomen nontender. NEUROLOGIC: Alert and oriented x 3. Bilateral equal strong had grasp and bilateral equal strong plantar flexion and dorsiflexion. GAIT: abnormal MUSCULOSKELETAL: ROM: full with pain. Tenderness: over the medial and lateral joint lines. Crepitus: present. SKIN: No rash, bruising, swelling, redness or warmth. Labs:  No results found for this or any previous visit (from the past 24 hour(s)). Xray Left Knee: joint space narrowing    Assessment:  Advanced Left Knee Osteoarthritis. Total Left Knee Arthroplasty Indicated.   Patient Active Problem List   Diagnosis Code    Snoring R06.83    Hypersomnia due to medical condition G47.14    Hypothyroidism E03.9    Persistent disorder of initiating or maintaining sleep G47.00    Other benign neoplasm of connective and other soft tissue of trunk, unspecified D21.6    Other joint derangement, not elsewhere classified, lower leg M23.8X9    Osteoarthritis of spine M47.9    Other and unspecified noninfectious gastroenteritis and colitis(558.9) K52.9    Unspecified dermatitis due to sun L57.8    Acute bronchitis J20.9    Irregular menstrual cycle N92.6    Rosacea L71.9    Esophageal reflux K21.9    Unspecified hypothyroidism E03.9    Synovial cyst of popliteal space M71.20    Pure hypercholesterolemia E78.00    Backache, unspecified M54.9    Mixed hyperlipidemia E78.2    Palpitations R00.2    Chest pain R07.9    Cervical spinal stenosis M48.02    Stenosis of cervical spine M48.02       Plan:  I have advised the patient of the risks and consequences, including possible complications of performing total joint replacement, as well as not doing this operation. The patient had the opportunity to ask questions and have them answered to their satisfaction.      Signed:  ALTAGRACIA Pickett 7/10/2020

## 2020-07-15 ENCOUNTER — ANESTHESIA EVENT (OUTPATIENT)
Dept: SURGERY | Age: 60
DRG: 470 | End: 2020-07-15
Payer: OTHER GOVERNMENT

## 2020-07-16 ENCOUNTER — HOSPITAL ENCOUNTER (INPATIENT)
Age: 60
LOS: 1 days | Discharge: HOME HEALTH | DRG: 470 | End: 2020-07-17
Attending: ORTHOPAEDIC SURGERY | Admitting: ORTHOPAEDIC SURGERY
Payer: OTHER GOVERNMENT

## 2020-07-16 ENCOUNTER — ANESTHESIA (OUTPATIENT)
Dept: SURGERY | Age: 60
DRG: 470 | End: 2020-07-16
Payer: OTHER GOVERNMENT

## 2020-07-16 DIAGNOSIS — Z96.652 TOTAL KNEE REPLACEMENT STATUS, LEFT: Primary | ICD-10-CM

## 2020-07-16 PROBLEM — M17.12 ARTHRITIS OF KNEE, LEFT: Status: ACTIVE | Noted: 2020-07-16

## 2020-07-16 LAB
GLUCOSE BLD STRIP.AUTO-MCNC: 105 MG/DL (ref 65–100)
HGB BLD-MCNC: 13.8 G/DL (ref 11.7–15.4)

## 2020-07-16 PROCEDURE — C1713 ANCHOR/SCREW BN/BN,TIS/BN: HCPCS | Performed by: ORTHOPAEDIC SURGERY

## 2020-07-16 PROCEDURE — 94760 N-INVAS EAR/PLS OXIMETRY 1: CPT

## 2020-07-16 PROCEDURE — 77030006835 HC BLD SAW SAG STRY -B: Performed by: ORTHOPAEDIC SURGERY

## 2020-07-16 PROCEDURE — 77030040922 HC BLNKT HYPOTHRM STRY -A: Performed by: ANESTHESIOLOGY

## 2020-07-16 PROCEDURE — 74011250636 HC RX REV CODE- 250/636: Performed by: ORTHOPAEDIC SURGERY

## 2020-07-16 PROCEDURE — C1776 JOINT DEVICE (IMPLANTABLE): HCPCS | Performed by: ORTHOPAEDIC SURGERY

## 2020-07-16 PROCEDURE — 76210000006 HC OR PH I REC 0.5 TO 1 HR: Performed by: ORTHOPAEDIC SURGERY

## 2020-07-16 PROCEDURE — 77030013819 HC MX SYS CEM ZIMM -B: Performed by: ORTHOPAEDIC SURGERY

## 2020-07-16 PROCEDURE — 97161 PT EVAL LOW COMPLEX 20 MIN: CPT

## 2020-07-16 PROCEDURE — 97110 THERAPEUTIC EXERCISES: CPT

## 2020-07-16 PROCEDURE — 82962 GLUCOSE BLOOD TEST: CPT

## 2020-07-16 PROCEDURE — 76942 ECHO GUIDE FOR BIOPSY: CPT | Performed by: ORTHOPAEDIC SURGERY

## 2020-07-16 PROCEDURE — 85018 HEMOGLOBIN: CPT

## 2020-07-16 PROCEDURE — 77030012935 HC DRSG AQUACEL BMS -B: Performed by: ORTHOPAEDIC SURGERY

## 2020-07-16 PROCEDURE — 65270000029 HC RM PRIVATE

## 2020-07-16 PROCEDURE — 74011250636 HC RX REV CODE- 250/636: Performed by: ANESTHESIOLOGY

## 2020-07-16 PROCEDURE — 77030033067 HC SUT PDO STRATFX SPIR J&J -B: Performed by: ORTHOPAEDIC SURGERY

## 2020-07-16 PROCEDURE — 77030007880 HC KT SPN EPDRL BBMI -B: Performed by: ANESTHESIOLOGY

## 2020-07-16 PROCEDURE — 74011000258 HC RX REV CODE- 258: Performed by: ORTHOPAEDIC SURGERY

## 2020-07-16 PROCEDURE — 77030031139 HC SUT VCRL2 J&J -A: Performed by: ORTHOPAEDIC SURGERY

## 2020-07-16 PROCEDURE — 77030006720 HC BLD PAT RMR ZIMM -B: Performed by: ORTHOPAEDIC SURGERY

## 2020-07-16 PROCEDURE — 97535 SELF CARE MNGMENT TRAINING: CPT

## 2020-07-16 PROCEDURE — 0SRD0JA REPLACEMENT OF LEFT KNEE JOINT WITH SYNTHETIC SUBSTITUTE, UNCEMENTED, OPEN APPROACH: ICD-10-PCS | Performed by: ORTHOPAEDIC SURGERY

## 2020-07-16 PROCEDURE — 76010010054 HC POST OP PAIN BLOCK: Performed by: ORTHOPAEDIC SURGERY

## 2020-07-16 PROCEDURE — 74011250637 HC RX REV CODE- 250/637: Performed by: ORTHOPAEDIC SURGERY

## 2020-07-16 PROCEDURE — 76010000162 HC OR TIME 1.5 TO 2 HR INTENSV-TIER 1: Performed by: ORTHOPAEDIC SURGERY

## 2020-07-16 PROCEDURE — 77030040361 HC SLV COMPR DVT MDII -B

## 2020-07-16 PROCEDURE — 77030019557 HC ELECTRD VES SEAL MEDT -F: Performed by: ORTHOPAEDIC SURGERY

## 2020-07-16 PROCEDURE — 77030037715 HC DRSG ZIP STRY -B: Performed by: ORTHOPAEDIC SURGERY

## 2020-07-16 PROCEDURE — 77030037714 HC CLOSR DEV INCIS ZIP STRY -C: Performed by: ORTHOPAEDIC SURGERY

## 2020-07-16 PROCEDURE — 36415 COLL VENOUS BLD VENIPUNCTURE: CPT

## 2020-07-16 PROCEDURE — 74011000250 HC RX REV CODE- 250: Performed by: NURSE ANESTHETIST, CERTIFIED REGISTERED

## 2020-07-16 PROCEDURE — 77030003665 HC NDL SPN BBMI -A: Performed by: ANESTHESIOLOGY

## 2020-07-16 PROCEDURE — 77030016544 HC BLD SAW RECIP1 STRY -B: Performed by: ORTHOPAEDIC SURGERY

## 2020-07-16 PROCEDURE — 77030003602 HC NDL NRV BLK BBMI -B: Performed by: ANESTHESIOLOGY

## 2020-07-16 PROCEDURE — 97165 OT EVAL LOW COMPLEX 30 MIN: CPT

## 2020-07-16 PROCEDURE — 76060000034 HC ANESTHESIA 1.5 TO 2 HR: Performed by: ORTHOPAEDIC SURGERY

## 2020-07-16 PROCEDURE — 94762 N-INVAS EAR/PLS OXIMTRY CONT: CPT

## 2020-07-16 PROCEDURE — 77030013708 HC HNDPC SUC IRR PULS STRY –B: Performed by: ORTHOPAEDIC SURGERY

## 2020-07-16 PROCEDURE — 74011250636 HC RX REV CODE- 250/636: Performed by: NURSE ANESTHETIST, CERTIFIED REGISTERED

## 2020-07-16 PROCEDURE — 77030018836 HC SOL IRR NACL ICUM -A: Performed by: ORTHOPAEDIC SURGERY

## 2020-07-16 PROCEDURE — 74011000250 HC RX REV CODE- 250: Performed by: ORTHOPAEDIC SURGERY

## 2020-07-16 DEVICE — INSERT TIB SZ 5 THK7MM KNEE POST STBL ROT PLATFRM ATTUNE: Type: IMPLANTABLE DEVICE | Site: KNEE | Status: FUNCTIONAL

## 2020-07-16 DEVICE — KNEE K2 TOT HEMI ADV CMTLS IMPL CAPPED SYNTHES: Type: IMPLANTABLE DEVICE | Status: FUNCTIONAL

## 2020-07-16 DEVICE — CEMENT BNE 20GM HALF DOSE PMMA VISC RADPQ FAST: Type: IMPLANTABLE DEVICE | Site: KNEE | Status: FUNCTIONAL

## 2020-07-16 RX ORDER — CLONAZEPAM 1 MG/1
1 TABLET ORAL
Status: DISCONTINUED | OUTPATIENT
Start: 2020-07-16 | End: 2020-07-17 | Stop reason: HOSPADM

## 2020-07-16 RX ORDER — AMOXICILLIN 250 MG
2 CAPSULE ORAL DAILY
Status: DISCONTINUED | OUTPATIENT
Start: 2020-07-17 | End: 2020-07-17 | Stop reason: HOSPADM

## 2020-07-16 RX ORDER — TRANEXAMIC ACID 100 MG/ML
INJECTION, SOLUTION INTRAVENOUS AS NEEDED
Status: DISCONTINUED | OUTPATIENT
Start: 2020-07-16 | End: 2020-07-16 | Stop reason: HOSPADM

## 2020-07-16 RX ORDER — HYDROMORPHONE HYDROCHLORIDE 2 MG/ML
0.5 INJECTION, SOLUTION INTRAMUSCULAR; INTRAVENOUS; SUBCUTANEOUS
Status: DISCONTINUED | OUTPATIENT
Start: 2020-07-16 | End: 2020-07-16 | Stop reason: HOSPADM

## 2020-07-16 RX ORDER — NALOXONE HYDROCHLORIDE 0.4 MG/ML
.2-.4 INJECTION, SOLUTION INTRAMUSCULAR; INTRAVENOUS; SUBCUTANEOUS
Status: DISCONTINUED | OUTPATIENT
Start: 2020-07-16 | End: 2020-07-17 | Stop reason: HOSPADM

## 2020-07-16 RX ORDER — SODIUM CHLORIDE 0.9 % (FLUSH) 0.9 %
5-40 SYRINGE (ML) INJECTION EVERY 8 HOURS
Status: DISCONTINUED | OUTPATIENT
Start: 2020-07-16 | End: 2020-07-17 | Stop reason: HOSPADM

## 2020-07-16 RX ORDER — DEXAMETHASONE SODIUM PHOSPHATE 4 MG/ML
INJECTION, SOLUTION INTRA-ARTICULAR; INTRALESIONAL; INTRAMUSCULAR; INTRAVENOUS; SOFT TISSUE
Status: COMPLETED | OUTPATIENT
Start: 2020-07-16 | End: 2020-07-16

## 2020-07-16 RX ORDER — SODIUM CHLORIDE, SODIUM LACTATE, POTASSIUM CHLORIDE, CALCIUM CHLORIDE 600; 310; 30; 20 MG/100ML; MG/100ML; MG/100ML; MG/100ML
75 INJECTION, SOLUTION INTRAVENOUS CONTINUOUS
Status: DISCONTINUED | OUTPATIENT
Start: 2020-07-16 | End: 2020-07-16 | Stop reason: HOSPADM

## 2020-07-16 RX ORDER — CEFAZOLIN SODIUM 1 G/3ML
INJECTION, POWDER, FOR SOLUTION INTRAMUSCULAR; INTRAVENOUS AS NEEDED
Status: DISCONTINUED | OUTPATIENT
Start: 2020-07-16 | End: 2020-07-16 | Stop reason: HOSPADM

## 2020-07-16 RX ORDER — ASPIRIN 81 MG/1
81 TABLET ORAL EVERY 12 HOURS
Status: DISCONTINUED | OUTPATIENT
Start: 2020-07-16 | End: 2020-07-17 | Stop reason: HOSPADM

## 2020-07-16 RX ORDER — PANTOPRAZOLE SODIUM 40 MG/1
40 TABLET, DELAYED RELEASE ORAL
Status: DISCONTINUED | OUTPATIENT
Start: 2020-07-16 | End: 2020-07-17 | Stop reason: HOSPADM

## 2020-07-16 RX ORDER — NITROFURANTOIN MACROCRYSTALS 50 MG/1
100 CAPSULE ORAL DAILY
Status: DISCONTINUED | OUTPATIENT
Start: 2020-07-17 | End: 2020-07-17 | Stop reason: HOSPADM

## 2020-07-16 RX ORDER — NALOXONE HYDROCHLORIDE 0.4 MG/ML
0.1 INJECTION, SOLUTION INTRAMUSCULAR; INTRAVENOUS; SUBCUTANEOUS
Status: DISCONTINUED | OUTPATIENT
Start: 2020-07-16 | End: 2020-07-16 | Stop reason: HOSPADM

## 2020-07-16 RX ORDER — ONDANSETRON 8 MG/1
8 TABLET, ORALLY DISINTEGRATING ORAL
Status: DISCONTINUED | OUTPATIENT
Start: 2020-07-16 | End: 2020-07-17 | Stop reason: HOSPADM

## 2020-07-16 RX ORDER — KETOROLAC TROMETHAMINE 30 MG/ML
INJECTION, SOLUTION INTRAMUSCULAR; INTRAVENOUS AS NEEDED
Status: DISCONTINUED | OUTPATIENT
Start: 2020-07-16 | End: 2020-07-16 | Stop reason: HOSPADM

## 2020-07-16 RX ORDER — FLUMAZENIL 0.1 MG/ML
0.2 INJECTION INTRAVENOUS AS NEEDED
Status: DISCONTINUED | OUTPATIENT
Start: 2020-07-16 | End: 2020-07-16 | Stop reason: HOSPADM

## 2020-07-16 RX ORDER — PROMETHAZINE HYDROCHLORIDE 25 MG/1
25 TABLET ORAL
Status: DISCONTINUED | OUTPATIENT
Start: 2020-07-16 | End: 2020-07-17 | Stop reason: HOSPADM

## 2020-07-16 RX ORDER — CEFAZOLIN SODIUM/WATER 2 G/20 ML
2 SYRINGE (ML) INTRAVENOUS EVERY 8 HOURS
Status: COMPLETED | OUTPATIENT
Start: 2020-07-16 | End: 2020-07-17

## 2020-07-16 RX ORDER — DEXAMETHASONE SODIUM PHOSPHATE 4 MG/ML
INJECTION, SOLUTION INTRA-ARTICULAR; INTRALESIONAL; INTRAMUSCULAR; INTRAVENOUS; SOFT TISSUE AS NEEDED
Status: DISCONTINUED | OUTPATIENT
Start: 2020-07-16 | End: 2020-07-16 | Stop reason: HOSPADM

## 2020-07-16 RX ORDER — SODIUM CHLORIDE 9 MG/ML
100 INJECTION, SOLUTION INTRAVENOUS CONTINUOUS
Status: DISCONTINUED | OUTPATIENT
Start: 2020-07-16 | End: 2020-07-17 | Stop reason: HOSPADM

## 2020-07-16 RX ORDER — FLUTICASONE PROPIONATE 50 MCG
2 SPRAY, SUSPENSION (ML) NASAL
Status: DISCONTINUED | OUTPATIENT
Start: 2020-07-16 | End: 2020-07-17 | Stop reason: HOSPADM

## 2020-07-16 RX ORDER — FENTANYL CITRATE 50 UG/ML
100 INJECTION, SOLUTION INTRAMUSCULAR; INTRAVENOUS ONCE
Status: COMPLETED | OUTPATIENT
Start: 2020-07-16 | End: 2020-07-16

## 2020-07-16 RX ORDER — DIPHENHYDRAMINE HCL 25 MG
25 CAPSULE ORAL
Status: DISCONTINUED | OUTPATIENT
Start: 2020-07-16 | End: 2020-07-17 | Stop reason: HOSPADM

## 2020-07-16 RX ORDER — SODIUM CHLORIDE 0.9 % (FLUSH) 0.9 %
5-40 SYRINGE (ML) INJECTION AS NEEDED
Status: DISCONTINUED | OUTPATIENT
Start: 2020-07-16 | End: 2020-07-17 | Stop reason: HOSPADM

## 2020-07-16 RX ORDER — MIDAZOLAM HYDROCHLORIDE 1 MG/ML
2 INJECTION, SOLUTION INTRAMUSCULAR; INTRAVENOUS ONCE
Status: COMPLETED | OUTPATIENT
Start: 2020-07-16 | End: 2020-07-16

## 2020-07-16 RX ORDER — DEXAMETHASONE SODIUM PHOSPHATE 100 MG/10ML
10 INJECTION INTRAMUSCULAR; INTRAVENOUS ONCE
Status: DISCONTINUED | OUTPATIENT
Start: 2020-07-17 | End: 2020-07-17 | Stop reason: HOSPADM

## 2020-07-16 RX ORDER — DIPHENHYDRAMINE HYDROCHLORIDE 50 MG/ML
12.5 INJECTION, SOLUTION INTRAMUSCULAR; INTRAVENOUS
Status: DISCONTINUED | OUTPATIENT
Start: 2020-07-16 | End: 2020-07-16 | Stop reason: HOSPADM

## 2020-07-16 RX ORDER — GLYCOPYRROLATE 0.2 MG/ML
INJECTION INTRAMUSCULAR; INTRAVENOUS AS NEEDED
Status: DISCONTINUED | OUTPATIENT
Start: 2020-07-16 | End: 2020-07-16 | Stop reason: HOSPADM

## 2020-07-16 RX ORDER — PROPOFOL 10 MG/ML
INJECTION, EMULSION INTRAVENOUS
Status: DISCONTINUED | OUTPATIENT
Start: 2020-07-16 | End: 2020-07-16 | Stop reason: HOSPADM

## 2020-07-16 RX ORDER — LEVOTHYROXINE SODIUM 150 UG/1
150 TABLET ORAL EVERY OTHER DAY
Status: DISCONTINUED | OUTPATIENT
Start: 2020-07-17 | End: 2020-07-17 | Stop reason: HOSPADM

## 2020-07-16 RX ORDER — ROPIVACAINE HYDROCHLORIDE 2 MG/ML
INJECTION, SOLUTION EPIDURAL; INFILTRATION; PERINEURAL AS NEEDED
Status: DISCONTINUED | OUTPATIENT
Start: 2020-07-16 | End: 2020-07-16 | Stop reason: HOSPADM

## 2020-07-16 RX ORDER — MIDAZOLAM HYDROCHLORIDE 1 MG/ML
2 INJECTION, SOLUTION INTRAMUSCULAR; INTRAVENOUS
Status: DISCONTINUED | OUTPATIENT
Start: 2020-07-16 | End: 2020-07-16 | Stop reason: HOSPADM

## 2020-07-16 RX ORDER — CELECOXIB 200 MG/1
200 CAPSULE ORAL EVERY 12 HOURS
Status: DISCONTINUED | OUTPATIENT
Start: 2020-07-16 | End: 2020-07-17 | Stop reason: HOSPADM

## 2020-07-16 RX ORDER — LEVOTHYROXINE SODIUM 175 UG/1
175 TABLET ORAL EVERY OTHER DAY
Status: DISCONTINUED | OUTPATIENT
Start: 2020-07-16 | End: 2020-07-17 | Stop reason: HOSPADM

## 2020-07-16 RX ORDER — ACETAMINOPHEN 500 MG
1000 TABLET ORAL EVERY 6 HOURS
Status: DISCONTINUED | OUTPATIENT
Start: 2020-07-16 | End: 2020-07-17 | Stop reason: HOSPADM

## 2020-07-16 RX ORDER — HYDROMORPHONE HYDROCHLORIDE 2 MG/1
2-4 TABLET ORAL
Status: DISCONTINUED | OUTPATIENT
Start: 2020-07-16 | End: 2020-07-17 | Stop reason: HOSPADM

## 2020-07-16 RX ORDER — LIDOCAINE HYDROCHLORIDE 10 MG/ML
0.1 INJECTION INFILTRATION; PERINEURAL AS NEEDED
Status: DISCONTINUED | OUTPATIENT
Start: 2020-07-16 | End: 2020-07-16 | Stop reason: HOSPADM

## 2020-07-16 RX ORDER — OXYCODONE HYDROCHLORIDE 5 MG/1
5 TABLET ORAL
Status: DISCONTINUED | OUTPATIENT
Start: 2020-07-16 | End: 2020-07-16 | Stop reason: HOSPADM

## 2020-07-16 RX ORDER — OXYCODONE HYDROCHLORIDE 5 MG/1
10 TABLET ORAL
Status: DISCONTINUED | OUTPATIENT
Start: 2020-07-16 | End: 2020-07-16 | Stop reason: HOSPADM

## 2020-07-16 RX ORDER — ONDANSETRON 2 MG/ML
INJECTION INTRAMUSCULAR; INTRAVENOUS AS NEEDED
Status: DISCONTINUED | OUTPATIENT
Start: 2020-07-16 | End: 2020-07-16 | Stop reason: HOSPADM

## 2020-07-16 RX ORDER — HYDROMORPHONE HYDROCHLORIDE 1 MG/ML
1 INJECTION, SOLUTION INTRAMUSCULAR; INTRAVENOUS; SUBCUTANEOUS
Status: DISCONTINUED | OUTPATIENT
Start: 2020-07-16 | End: 2020-07-17 | Stop reason: HOSPADM

## 2020-07-16 RX ORDER — CEFAZOLIN SODIUM/WATER 2 G/20 ML
2 SYRINGE (ML) INTRAVENOUS ONCE
Status: DISCONTINUED | OUTPATIENT
Start: 2020-07-16 | End: 2020-07-16 | Stop reason: ALTCHOICE

## 2020-07-16 RX ADMIN — DEXAMETHASONE SODIUM PHOSPHATE 4 MG: 4 INJECTION, SOLUTION INTRAMUSCULAR; INTRAVENOUS at 06:46

## 2020-07-16 RX ADMIN — Medication 10 ML: at 22:47

## 2020-07-16 RX ADMIN — DEXAMETHASONE SODIUM PHOSPHATE 10 MG: 4 INJECTION, SOLUTION INTRAMUSCULAR; INTRAVENOUS at 07:28

## 2020-07-16 RX ADMIN — LEVOTHYROXINE SODIUM 175 MCG: 175 TABLET ORAL at 21:00

## 2020-07-16 RX ADMIN — HYDROMORPHONE HYDROCHLORIDE 2 MG: 2 TABLET ORAL at 10:43

## 2020-07-16 RX ADMIN — PANTOPRAZOLE SODIUM 40 MG: 40 TABLET, DELAYED RELEASE ORAL at 21:45

## 2020-07-16 RX ADMIN — ONDANSETRON 8 MG: 8 TABLET, ORALLY DISINTEGRATING ORAL at 09:39

## 2020-07-16 RX ADMIN — WATER 2 G: 100 INJECTION, SOLUTION INTRAVENOUS at 14:37

## 2020-07-16 RX ADMIN — TRANEXAMIC ACID 1000 MG: 100 INJECTION, SOLUTION INTRAVENOUS at 07:04

## 2020-07-16 RX ADMIN — ASPIRIN 81 MG: 81 TABLET, COATED ORAL at 20:34

## 2020-07-16 RX ADMIN — MIDAZOLAM 2 MG: 1 INJECTION INTRAMUSCULAR; INTRAVENOUS at 06:51

## 2020-07-16 RX ADMIN — GLYCOPYRROLATE 0.2 MG: 0.2 INJECTION, SOLUTION INTRAMUSCULAR; INTRAVENOUS at 07:32

## 2020-07-16 RX ADMIN — ONDANSETRON 8 MG: 8 TABLET, ORALLY DISINTEGRATING ORAL at 17:34

## 2020-07-16 RX ADMIN — PROMETHAZINE HYDROCHLORIDE 25 MG: 25 TABLET ORAL at 21:45

## 2020-07-16 RX ADMIN — SODIUM CHLORIDE, SODIUM LACTATE, POTASSIUM CHLORIDE, AND CALCIUM CHLORIDE 75 ML/HR: 600; 310; 30; 20 INJECTION, SOLUTION INTRAVENOUS at 06:07

## 2020-07-16 RX ADMIN — HYDROMORPHONE HYDROCHLORIDE 4 MG: 2 TABLET ORAL at 14:36

## 2020-07-16 RX ADMIN — HYDROMORPHONE HYDROCHLORIDE 1 MG: 1 INJECTION, SOLUTION INTRAMUSCULAR; INTRAVENOUS; SUBCUTANEOUS at 11:31

## 2020-07-16 RX ADMIN — SODIUM CHLORIDE, SODIUM LACTATE, POTASSIUM CHLORIDE, AND CALCIUM CHLORIDE: 600; 310; 30; 20 INJECTION, SOLUTION INTRAVENOUS at 07:29

## 2020-07-16 RX ADMIN — Medication 1 AMPULE: at 20:35

## 2020-07-16 RX ADMIN — TRANEXAMIC ACID 1000 MG: 100 INJECTION, SOLUTION INTRAVENOUS at 08:10

## 2020-07-16 RX ADMIN — ROPIVACAINE HYDROCHLORIDE 10 ML: 2 INJECTION, SOLUTION EPIDURAL; INFILTRATION at 06:46

## 2020-07-16 RX ADMIN — FENTANYL CITRATE 50 MCG: 50 INJECTION, SOLUTION INTRAMUSCULAR; INTRAVENOUS at 06:51

## 2020-07-16 RX ADMIN — HYDROMORPHONE HYDROCHLORIDE 1 MG: 1 INJECTION, SOLUTION INTRAMUSCULAR; INTRAVENOUS; SUBCUTANEOUS at 16:06

## 2020-07-16 RX ADMIN — HYDROMORPHONE HYDROCHLORIDE 4 MG: 2 TABLET ORAL at 20:34

## 2020-07-16 RX ADMIN — MEPIVACAINE HYDROCHLORIDE 60 MG: 20 INJECTION, SOLUTION EPIDURAL; INFILTRATION at 07:08

## 2020-07-16 RX ADMIN — CELECOXIB 200 MG: 200 CAPSULE ORAL at 20:34

## 2020-07-16 RX ADMIN — Medication 3 AMPULE: at 06:07

## 2020-07-16 RX ADMIN — ONDANSETRON 4 MG: 2 INJECTION INTRAMUSCULAR; INTRAVENOUS at 08:11

## 2020-07-16 RX ADMIN — CEFAZOLIN 2 G: 1 INJECTION, POWDER, FOR SOLUTION INTRAMUSCULAR; INTRAVENOUS; PARENTERAL at 06:59

## 2020-07-16 RX ADMIN — PROPOFOL 75 MCG/KG/MIN: 10 INJECTION, EMULSION INTRAVENOUS at 07:09

## 2020-07-16 NOTE — PERIOP NOTES
TRANSFER - OUT REPORT:    Verbal report given to [facility] RN(name) on Chris Wyman  being transferred to PRE-OP(unit) for routine progression of care       Report consisted of patients Situation, Background, Assessment and   Recommendations(SBAR). Information from the following report(s) SBAR, MAR and Recent Results was reviewed with the receiving nurse. Lines:   Peripheral IV 07/16/20 Left Forearm (Active)   Site Assessment Clean, dry, & intact 07/16/20 0600   Phlebitis Assessment 0 07/16/20 0600   Infiltration Assessment 0 07/16/20 0600   Dressing Status Clean, dry, & intact 07/16/20 0600   Dressing Type Tape;Transparent 07/16/20 0600   Hub Color/Line Status Pink; Infusing 07/16/20 0600   Action Taken Blood drawn 07/16/20 0600        Opportunity for questions and clarification was provided.       Patient transported with:   [employer]

## 2020-07-16 NOTE — PROGRESS NOTES
Received to room from PACU. Alert and oriented x3. Left knee surgical bandage is clean, dry, and intact. No NV deficits noted. Discussed diet and pain control. Oriented to room and bed functions.

## 2020-07-16 NOTE — PROGRESS NOTES
Care Management Interventions  PCP Verified by CM: Yes  Mode of Transport at Discharge: Self  Transition of Care Consult (CM Consult): Home Health  Discharge Durable Medical Equipment: No  Physical Therapy Consult: Yes  Occupational Therapy Consult: Yes  Current Support Network: Lives with Spouse  Confirm Follow Up Transport: Family  Discharge Location  Discharge Placement: Home with home health  Patient is a 64y.o. year old female admitted for Left TKA . Patient plans to return home on discharge. Order received to arrange home health. Patient without preference towards agency. Referral sent to [facility]. Patient denies any equipment needs as patient has a walker. Will follow until discharge.

## 2020-07-16 NOTE — ANESTHESIA POSTPROCEDURE EVALUATION
Procedure(s):  KNEE ARTHROPLASTY TOTAL/ LEFT/ DEPUY. spinal    Anesthesia Post Evaluation      Multimodal analgesia: multimodal analgesia used between 6 hours prior to anesthesia start to PACU discharge  Patient location during evaluation: PACU  Patient participation: complete - patient participated  Level of consciousness: awake  Pain management: adequate  Airway patency: patent  Anesthetic complications: no  Cardiovascular status: acceptable and hemodynamically stable  Respiratory status: acceptable  Hydration status: acceptable  Comments: Acceptable for discharge from PACU.   Post anesthesia nausea and vomiting:  none  Final Post Anesthesia Temperature Assessment:  Normothermia (36.0-37.5 degrees C)      INITIAL Post-op Vital signs:   Vitals Value Taken Time   /66 7/16/2020  9:06 AM   Temp 36.7 °C (98 °F) 7/16/2020  8:36 AM   Pulse 71 7/16/2020  9:06 AM   Resp 14 7/16/2020  9:06 AM   SpO2 99 % 7/16/2020  9:06 AM

## 2020-07-16 NOTE — ANESTHESIA PREPROCEDURE EVALUATION
Relevant Problems   No relevant active problems       Anesthetic History     PONV          Review of Systems / Medical History  Patient summary reviewed and pertinent labs reviewed    Pulmonary  Within defined limits                 Neuro/Psych         Psychiatric history     Cardiovascular  Within defined limits                Exercise tolerance: >4 METS     GI/Hepatic/Renal     GERD: well controlled           Endo/Other      Hypothyroidism: well controlled  Arthritis     Other Findings              Physical Exam    Airway  Mallampati: I  TM Distance: 4 - 6 cm  Neck ROM: normal range of motion   Mouth opening: Normal     Cardiovascular    Rhythm: regular  Rate: normal         Dental  No notable dental hx       Pulmonary  Breath sounds clear to auscultation               Abdominal         Other Findings            Anesthetic Plan    ASA: 2  Anesthesia type: spinal      Post-op pain plan if not by surgeon: peripheral nerve block single      Anesthetic plan and risks discussed with: Patient

## 2020-07-16 NOTE — PERIOP NOTES
Betadine lavage:  17.5cc of betadine lot #[id number]  , exp. [id number]  ,  in 1cc of . 9NS Lot #[id number]  , exp.  Date :02-

## 2020-07-16 NOTE — PROGRESS NOTES
TRANSFER - IN REPORT:    Verbal report received from Osmani Zurita RN on [address] [address]  being received from PACU for routine post - op      Report consisted of patients Situation, Background, Assessment and   Recommendations(SBAR). Information from the following report(s) SBAR, Kardex, OR Summary, Procedure Summary, Intake/Output and MAR was reviewed with the receiving nurse. Opportunity for questions and clarification was provided. Assessment completed upon patients arrival to unit and care assumed.

## 2020-07-16 NOTE — INTERVAL H&P NOTE
Update History & Physical    The Patient's History and Physical of July 10,   2020 was reviewed with the patient and I examined the patient. There was no change. The surgical site was confirmed by the patient and me. Plan:  The risk, benefits, expected outcome, and alternative to the recommended procedure have been discussed with the patient. Patient understands and wants to proceed with the procedure.     Electronically signed by ALTAGRACIA Novoa on 7/16/2020 at 6:55 AM

## 2020-07-16 NOTE — OP NOTES
[facility]  CementlessTotal Knee Arthroplasty  Shabbir Espinal   : 1960  Medical Record Number:[id number]  Pre-operative Diagnosis:  Unilateral primary osteoarthritis, left knee [M17.12]  Post-operative Diagnosis: Unilateral primary osteoarthritis, left knee [M17.12]    Surgeon: Peggy Gordon MD  Assistant: Celeste Rubio PA-C    Anesthesia: Spinal    Procedure: Cementless Total Knee Arthroplasty   The complexity of the total joint surgery requires the use of a first assistant for positioning, retraction and assistance in closure. The patient's Body mass index is 32.53 kg/m²., BMI's greater then 40 make surgical exposure and retraction extremely difficult and increase operative time. Tourniquet Time: none  EBL: 150cc  Additional Findings: Severe DJD  Releases none    Radha Mccormick was brought to the operating room and positioned on the operating table. She was anethestized  IV antibiotics were administered per CMS protocol. Prior to the incision being made a timeout was called identifying the patient, procedure ,operative side and surgeon. The left leg was prepped and draped in the usual sterile manner  An anterior longitudinal incision was accomplished just medial to the tibial tubercle and extending approximal 6 centimeters proximal to the superior pole of the patella. A medial parapatellar capsular incision was performed. The medial capsular flap was elevated around to the insertion of the semimembranous tendon. The patella was everted and the knee flexed and externally rotated. The medial and external menisci were excised. The lateral half of the fat pad excised and the patella femoral ligament was released. The anterior cruciate ligament was resected and the posterior cruciate ligament was substituted. Using extramedullary instrumentation, the tibial cut was accomplished with appropriate posterior slope.   Approxiamately 2 mm of bone was removed from the low side of the tibia. The distal femur was next addressed. A drill hole was made above the intracondylar notch. Using appropriate intramedullary instrumentation,a 5 degree valgus distal cut was accomplished. A femur was sized. The anterior and posterior cuts were then made about the distal femur. The osteophytes were removed from the tibial and femoral surfaces. The flexion and extension gaps were assessed with the appropriate spacer blocks. Additional surgical procedures included none. The flexion and extension gaps were deemed appropriately balanced. The appropriate cutting blocks were then utilized to perform the anterior chamfer, posterior chamfer and notch cuts, with appropriate lateral tranlation accomplished for the patellofemoral groove. The tibia was sized. The tibial base plate was pinned into place with the appropriate external rotation and stem site prepared. A preliminary range of motion was accomplished with the above size trial components. A polyethylene insert allowed the patient to obtain full extension as well as appropriate flexion. The patient's ligaments were stable in flexion and extension to medial and lateral stressing and the alignment was through the appropriate mechanical axis. The patella was then everted. Given grade 3-4 chondromalacia, the patella was resurfaced with a cemented all-poly patella. All trial components were removed and the implants were impacted into position with good fixation. The betadine lavage protocol was not performed. Minda Camp knee was placed through range of motion and noted to be stable as mentioned above with the trial components. The wound was dry, therefore no drain was used. The operative knee was injected with 60cc of Naropin, 10 cc's of morphine and 1 cc of 30mg of Toradol.   The capsular layer was closed using a #1 vicryl suture, while subcutaneous layers were closed using 2-0 Vicryl interrupted sutures and a #1 Stratofix. Finally the skin was closed using 3-0 Vicryl and a Zipline closure. A sterile sterile bandage was applied. An Iceman cryo pad was applied on the operative leg. Sponge count and needle counts were correct. Archana Austin left the operating room     Implants:   Implant Name Type Inv.  Item Serial No.  Lot No. LRB No. Used Action   FEMORAL SIZE 5 CEMENTLESS     [id number] Left 1 Implanted   INSERT TIB PS RP SZ 5 7MM -- ATTUNE - [id number]  INSERT TIB PS RP SZ 5 7MM -- ATTUNE  [facility] [employer] [id number] Left 1 Implanted   CEMENT BNE FAST SET 20GM --  - [id number]  CEMENT BNE FAST SET 20GM --   [employer] [id number] Left 1 Implanted   DEPUY TIBIAL BASE CEMENTLESS     [id number] Left 1 Implanted   DEPUY PATELLA MEDIALIZED     [id number] Left 1 Implanted     Signed By: Geralene Fleischer, MD

## 2020-07-16 NOTE — ANESTHESIA PROCEDURE NOTES
Spinal Block    Start time: 7/16/2020 7:05 AM  End time: 7/16/2020 7:08 AM  Performed by: Nikc Hayes MD  Authorized by: Nick Hayes MD     Pre-procedure:   Indications: primary anesthetic  Preanesthetic Checklist: patient identified, risks and benefits discussed, anesthesia consent, site marked, patient being monitored and timeout performed    Timeout Time: 07:04          Spinal Block:   Patient Position:  Seated  Prep Region:  Lumbar  Prep: chlorhexidine      Location:  L3-4  Technique:  Single shot    Local Dose (mL):  3    Needle:   Needle Type:  Pencil-tip  Needle Gauge:  25 G  Attempts:  1      Events: CSF confirmed, no blood with aspiration and no paresthesia        Assessment:  Insertion:  Uncomplicated  Patient tolerance:  Patient tolerated the procedure well with no immediate complications

## 2020-07-16 NOTE — PROGRESS NOTES
07/16/20 1119   Oxygen Therapy   O2 Sat (%) 97 %   Pulse via Oximetry 90 beats per minute   O2 Device Room air   O2 Flow Rate (L/min) 0 l/min   Incentive Spirometry Treatment   Number of Attempts   (PT IN TOO MUCH PAIN FOR IS AT THIS TIME)   C/S SET UP IN ROOM.   PT., IS IN TOO MUCH PAIN FOR IS

## 2020-07-16 NOTE — PROGRESS NOTES
Problem: Self Care Deficits Care Plan (Adult)  Goal: *Acute Goals and Plan of Care (Insert Text)  Description: GOALS:   DISCHARGE GOALS (in preparation for going home/rehab):  3 days  1. Ms. Mikala Brown will perform one lower body dressing activity with stand by assistance required to demonstrate improved functional mobility and safety. 2.  Ms. Mikala Brown will perform one lower body bathing activity with stand by assistance required to demonstrate improved functional mobility and safety. 3.  Ms. Mikala Brown will perform toileting/toilet transfer with stand by assistance to demonstrate improved functional mobility and safety. 4.  Ms. Mikala Brown will perform shower transfer with stand by assistance to demonstrate improved functional mobility and safety. Outcome: Progressing Towards Goal     JOINT CAMP OCCUPATIONAL THERAPY TKA: Initial Assessment, Daily Note, Treatment Day: Day of Assessment, and PM 7/16/2020  INPATIENT: Hospital Day: 1  Payor: ANUJ / Plan: Henry Crocker 74 / Product Type: Frieda Ackerman /      NAME/AGE/GENDER: Rashard Quezada is a 64 y.o. female   PRIMARY DIAGNOSIS:  Unilateral primary osteoarthritis, left knee [M17.12]   Procedure(s) and Anesthesia Type:     * KNEE ARTHROPLASTY TOTAL/ LEFT/ DEPUY - Spinal (Left)  ICD-10: Treatment Diagnosis:    Pain in Left Knee (M25.562)  Stiffness of Left Knee, Not elsewhere classified (W56.332)      ASSESSMENT:     Ms. Mikala Brown is s/p left TKA and presents with decreased weight bearing on left LE and decreased independence with functional mobility and activities of daily living as compared to baseline level of function and safety. Patient would benefit from skilled Occupational Therapy to maximize independence and safety with self-care task and functional mobility. Pt would also benefit from education on adaptive equipment and safety precautions in preparation for going home. Pt seen in room with  present.  Pt with incontinence and wet bed, PCT notified for bed change. Pt up to the bathroom for lower body bathing, toileting and dressing. Pt should progress well. This section established at most recent assessment   PROBLEM LIST (Impairments causing functional limitations):  Decreased Strength  Decreased ADL/Functional Activities  Decreased Transfer Abilities  Increased Pain  Increased Fatigue  Decreased Flexibility/Joint Mobility  Decreased Knowledge of Precautions   INTERVENTIONS PLANNED: (Benefits and precautions of occupational therapy have been discussed with the patient.)  Activities of daily living training  Adaptive equipment training  Balance training  Clothing management  Donning&doffing training  Theraputic activity     TREATMENT PLAN: Frequency/Duration: Follow patient 1-2 times to address above goals. Rehabilitation Potential For Stated Goals: Good     RECOMMENDED REHABILITATION/EQUIPMENT: (at time of discharge pending progress): Continue Skilled Therapy. OCCUPATIONAL PROFILE AND HISTORY:   History of Present Injury/Illness (Reason for Referral): Pt presents this date s/p (left) TKA.     Past Medical History/Comorbidities:   Ms. Evangelista Jaimes  has a past medical history of Abdominal pain, epigastric, Abdominal pain, periumbilic, Absence of menstruation, Acute bronchitis (1/16/2015), Acute pancreatitis, Acute pharyngitis, Adverse effect of anesthesia, Anxiety, Anxiety state, unspecified, Backache, unspecified (1/16/2015), Bronchitis, not specified as acute or chronic, Cervicalgia, Chest pain (11/17/2015), Chondromalacia of patella, Chronic maxillary sinusitis, Chronic UTI, Contusion of toe, Cough, Disturbance of skin sensation, Dyspepsia and other specified disorders of function of stomach, Esophageal reflux (1/16/2015), GERD (gastroesophageal reflux disease), Hypersomnia due to medical condition (12/1/2014), Hypothyroidism (12/1/2014), Insomnia, unspecified, Irregular menstrual cycle (1/16/2015), Irritability, Lateral epicondylitis of elbow, Lumbago, Migraine, unspecified, without mention of intractable migraine without mention of status migrainosus, Mixed hyperlipidemia (1/16/2015), Open wound of lip, without mention of complication, Open wound(s) (multiple) of unspecified site(s), without mention of complication, Other acute reactions to stress, Other and unspecified complications of medical care, not elsewhere classified, Other and unspecified hyperlipidemia, Other and unspecified mycoses, Other and unspecified noninfectious gastroenteritis and colitis(558.9) (1/16/2015), Other benign neoplasm of connective and other soft tissue of trunk, unspecified (1/16/2015), Other dysfunctions of sleep stages or arousal from sleep, Other joint derangement, not elsewhere classified, lower leg (1/16/2015), 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), Other specified symptom associated with female genital organs, Pain in joint, lower leg, Pain in joint, pelvic region and thigh, Pain in limb, Palpitations (11/17/2015), Persistent disorder of initiating or maintaining sleep (12/1/2014), Pneumonia, organism unspecified(486), PONV (postoperative nausea and vomiting), Postmenopausal atrophic vaginitis, Prolonged depressive reaction, Pure hypercholesterolemia (1/16/2015), Rosacea (1/16/2015), Sacroiliitis, not elsewhere classified ([facility] Snores, Spasm of muscle, Spondylosis of unspecified site without mention of myelopathy (1/16/2015), Sprain of ankle, unspecified site, Sprain of lumbar region, Symptomatic menopausal or female climacteric states, Synovial cyst of popliteal space (1/16/2015), Thoracic or lumbosacral neuritis or radiculitis, unspecified, Unspecified dermatitis due to sun (1/16/2015), Unspecified hypothyroidism (1/16/2015), Unspecified menopausal and postmenopausal disorder, Unspecified monoarthritis, ankle and foot, Unspecified pruritic disorder, Unspecified viral infection, in conditions classified elsewhere and of unspecified site, and Urinary frequency. Ms. Angel Mathis  has a past surgical history that includes hx colonoscopy (); hx heart catheterization (); hx lap cholecystectomy (); hx lumbar laminectomy; hx tonsillectomy; hx  section; and hx cervical fusion (2019). Social History/Living Environment:   Home Environment: Private residence  # Steps to Enter: 4  One/Two Story Residence: Two story, live on 1st floor  # of Interior Steps: 12  Living Alone: No  Support Systems: Spouse/Significant Other/Partner  Patient Expects to be Discharged to[de-identified] Private residence  Current DME Used/Available at Home: [employer] [place], straight  Prior Level of Function/Work/Activity:  Independent      Number of Personal Factors/Comorbidities that affect the Plan of Care: Brief history (0):  LOW COMPLEXITY   ASSESSMENT OF OCCUPATIONAL PERFORMANCE[de-identified]   Most Recent Physical Functioning:   Balance  Sitting: Intact  Standing: Intact; With support                    Coordination  Fine Motor Skills-Upper: Left Intact; Right Intact  Gross Motor Skills-Upper: Left Intact; Right Intact         Mental Status  Neurologic State: Alert  Orientation Level: Oriented X4  Perception: Appears intact  Perseveration: No perseveration noted  Safety/Judgement: Awareness of environment                Basic ADLs (From Assessment) Complex ADLs (From Assessment)   Basic ADL  Feeding: Independent  Oral Facial Hygiene/Grooming: Supervision  Bathing: Contact guard assistance  Upper Body Dressing: Supervision  Lower Body Dressing: Minimum assistance  Toileting: Minimum assistance     Grooming/Bathing/Dressing Activities of Daily Living     Cognitive Retraining  Safety/Judgement: Awareness of environment                 Functional Transfers  Bathroom Mobility: Contact guard assistance  Toilet Transfer : Contact guard assistance  Shower Transfer: Contact guard assistance     Bed/Mat Mobility  Supine to Sit: Contact guard assistance  Sit to Stand: Contact guard assistance  Stand to Sit: Contact guard assistance  Bed to Chair: Contact guard assistance  Scooting: Additional time         Physical Skills Involved:  Range of Motion  Balance  Strength Cognitive Skills Affected (resulting in the inability to perform in a timely and safe manner):  none Psychosocial Skills Affected:  Environmental Adaptation   Number of elements that affect the Plan of Care: 3-5:  MODERATE COMPLEXITY   CLINICAL DECISION MAKIN[address] [place] [address] AM-PAC 6 Clicks   Daily Activity Inpatient Short Form  How much help from another person does the patient currently need. .. Total A Lot A Little None   1. Putting on and taking off regular lower body clothing? [] 1   [] 2   [x] 3   [] 4   2. Bathing (including washing, rinsing, drying)? [] 1   [] 2   [x] 3   [] 4   3. Toileting, which includes using toilet, bedpan or urinal?   [] 1   [] 2   [x] 3   [] 4   4. Putting on and taking off regular upper body clothing? [] 1   [] 2   [] 3   [x] 4   5. Taking care of personal grooming such as brushing teeth? [] 1   [] 2   [] 3   [x] 4   6. Eating meals? [] 1   [] 2   [] 3   [x] 4   © , Trustees of [address] [place] [address], under license to [employer]. All rights reserved     Score:  Initial: 21 Most Recent: X (Date: -- )    Interpretation of Tool:  Represents activities that are increasingly more difficult (i.e. Bed mobility, Transfers, Gait). Use of outcome tool(s) and clinical judgement create a POC that gives a: LOW COMPLEXITY            TREATMENT:   (In addition to Assessment/Re-Assessment sessions the following treatments were rendered)     Pre-treatment Symptoms/Complaints:  moves well with complaint of pain  Pain: Initial:   Pain Intensity 1: 6 6 Post Session:  6     Self Care: (15 min): Procedure(s) (per grid) utilized to improve and/or restore self-care/home management as related to dressing, bathing, toileting, and grooming.  Required minimal verbal and   cueing to facilitate activities of daily living skills and compensatory activities. OT evaluation completed   Treatment/Session Assessment:     Response to Treatment:  pt up in room tolerated well. Education:  [] Home Exercises  [x] Fall Precautions  [] Hip Precautions [] Going Home Video  [x] Knee/Hip Prosthesis Review  [x] Walker Management/Safety [x] Adaptive Equipment as Needed       Interdisciplinary Collaboration:   Physical Therapist  Occupational Therapist  Registered Nurse    After treatment position/precautions: Up walking with PT       Compliance with Program/Exercises: Compliant all of the time, Will assess as treatment progresses. Recommendations/Intent for next treatment session:  Treatment next visit will focus on increasing Ms. Espinal's independence with bed mobility, transfers, self care, functional mobility, modalities for pain, and patient education.       Total Treatment Duration:  OT Patient Time In/Time Out  Time In: 1345  Time Out: [address], OT

## 2020-07-16 NOTE — ANESTHESIA PROCEDURE NOTES
Peripheral Block    Start time: 7/16/2020 6:44 AM  End time: 7/16/2020 6:46 AM  Performed by: Karen Velazquez MD  Authorized by: Karen Velazquez MD       Pre-procedure: Indications: at surgeon's request and post-op pain management    Preanesthetic Checklist: patient identified, risks and benefits discussed, site marked, timeout performed, anesthesia consent given and patient being monitored    Timeout Time: 06:44          Block Type:   Block Type:   Adductor canal  Laterality:  Left  Monitoring:  Standard ASA monitoring, continuous pulse ox, frequent vital sign checks, heart rate, responsive to questions and oxygen  Injection Technique:  Single shot  Procedures: ultrasound guided    Patient Position: supine  Prep: chlorhexidine    Location:  Mid thigh  Needle Type:  Stimuplex  Needle Gauge:  21 G  Needle Localization:  Ultrasound guidance    Assessment:  Number of attempts:  1  Injection Assessment:  Incremental injection every 5 mL, no paresthesia, ultrasound image on chart, local visualized surrounding nerve on ultrasound, negative aspiration for blood and no intravascular symptoms  Patient tolerance:  Patient tolerated the procedure well with no immediate complications

## 2020-07-16 NOTE — PERIOP NOTES
TRANSFER - OUT REPORT:    Verbal report given to receiving nurse Becka(name) on Domonique Lord being transferred to [facility](unit) for routine progression of care       Report consisted of patient's Situation, Background, Assessment and   Recommendations(SBAR). Information from the following report(s) OR Summary, Intake/Output and MAR was reviewed with the receiving nurse. Opportunity for questions and clarification was provided.       Patient transported with:   ison furniture

## 2020-07-16 NOTE — PROGRESS NOTES
Problem: Mobility Impaired (Adult and Pediatric)  Goal: *Acute Goals and Plan of Care (Insert Text)  Note: GOALS (1-4 days):  (1.)Ms. Espinal will move from supine to sit and sit to supine  in bed with STAND BY ASSIST.  (2.)Ms. Espinal will transfer from bed to chair and chair to bed with STAND BY ASSIST using the least restrictive device. (3.)Ms. Espinal will ambulate with STAND BY ASSIST for 200 feet with the least restrictive device. (4.)Ms. Espinal will ambulate up/down 4 steps with right railing with CONTACT GUARD ASSIST with no device. (5.)Ms. Espinal will increase left knee ROM to 5°-80°.  ________________________________________________________________________________________________      PHYSICAL THERAPY JOINT CAMP TKA: Initial Assessment 7/16/2020  INPATIENT: Hospital Day: 1  Payor: Consuela Lombard / Plan: Henry Crocker 74 / Product Type:  /      NAME/AGE/GENDER: Steph Darnell is a 64 y.o. female   PRIMARY DIAGNOSIS:  Unilateral primary osteoarthritis, left knee [M17.12]   Procedure(s) and Anesthesia Type:     * KNEE ARTHROPLASTY TOTAL/ LEFT/ DEPUY - Spinal (Left)  ICD-10: Treatment Diagnosis:    · Pain in Left Knee (M25.562)  · Stiffness of Left Knee, Not elsewhere classified (M25.662)  · Difficulty in walking, Not elsewhere classified (R26.2)  · Other abnormalities of gait and mobility (R26.89)      ASSESSMENT:     Ms. Yaa Jaffe presents with strength and ROM L LE and limited functional mobility S/P L TKA. Independent with gait and ADLs PTA. She will benefit from skilled therapy services to address the below problem list.     This section established at most recent assessment   PROBLEM LIST (Impairments causing functional limitations):  1. Decreased Strength  2. Decreased ADL/Functional Activities  3. Decreased Transfer Abilities  4. Decreased Ambulation Ability/Technique  5. Increased Pain  6. Decreased Flexibility/Joint Mobility  7.  Decreased [place] with Home Exercise Program   INTERVENTIONS PLANNED: (Benefits and precautions of physical therapy have been discussed with the patient.)  1. bed mobility  2. gait training  3. home exercise program (HEP)  4. Range of Motion: active/assisted/passive  5. Therapeutic Activities  6. therapeutic exercise/strengthening  7. transfer training  8. Group Therapy     TREATMENT PLAN: Frequency/Duration: Follow patient BID for duration of hospital stay to address above goals. Rehabilitation Potential For Stated Goals: Good     RECOMMENDED REHABILITATION/EQUIPMENT: (at time of discharge pending progress): Continue Skilled Therapy and Home Health: Physical Therapy.               HISTORY:   History of Present Injury/Illness (Reason for Referral):  S/P L TKA  Past Medical History/Comorbidities:   Ms. Joyce Ellington  has a past medical history of Abdominal pain, epigastric, Abdominal pain, periumbilic, Absence of menstruation, Acute bronchitis (1/16/2015), Acute pancreatitis, Acute pharyngitis, Adverse effect of anesthesia, Anxiety, Anxiety state, unspecified, Backache, unspecified (1/16/2015), Bronchitis, not specified as acute or chronic, Cervicalgia, Chest pain (11/17/2015), Chondromalacia of patella, Chronic maxillary sinusitis, Chronic UTI, Contusion of toe, Cough, Disturbance of skin sensation, Dyspepsia and other specified disorders of function of stomach, Esophageal reflux (1/16/2015), GERD (gastroesophageal reflux disease), Hypersomnia due to medical condition (12/1/2014), Hypothyroidism (12/1/2014), Insomnia, unspecified, Irregular menstrual cycle (1/16/2015), Irritability, Lateral epicondylitis  of elbow, Lumbago, Migraine, unspecified, without mention of intractable migraine without mention of status migrainosus, Mixed hyperlipidemia (1/16/2015), Open wound of lip, without mention of complication, Open wound(s) (multiple) of unspecified site(s), without mention of complication, Other acute reactions to stress, Other and unspecified complications of medical care, not elsewhere classified, Other and unspecified hyperlipidemia, Other and unspecified mycoses, Other and unspecified noninfectious gastroenteritis and colitis(558.9) (2015), Other benign neoplasm of connective and other soft tissue of trunk, unspecified (2015), Other dysfunctions of sleep stages or arousal from sleep, Other joint derangement, not elsewhere classified, lower leg (2015), 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), Other specified symptom associated with female genital organs, Pain in joint, lower leg, Pain in joint, pelvic region and thigh, Pain in limb, Palpitations (2015), Persistent disorder of initiating or maintaining sleep (2014), Pneumonia, organism unspecified(486), PONV (postoperative nausea and vomiting), Postmenopausal atrophic vaginitis, Prolonged depressive reaction, Pure hypercholesterolemia (2015), Rosacea (2015), Sacroiliitis, not elsewhere classified ([facility] [address] Snores, Spasm of muscle, Spondylosis of unspecified site without mention of myelopathy (2015), Sprain of ankle, unspecified site, Sprain of lumbar region, Symptomatic menopausal or female climacteric states, Synovial cyst of popliteal space (2015), Thoracic or lumbosacral neuritis or radiculitis, unspecified, Unspecified dermatitis due to sun (2015), Unspecified hypothyroidism (2015), Unspecified menopausal and postmenopausal disorder, Unspecified monoarthritis, ankle and foot, Unspecified pruritic disorder, Unspecified viral infection, in conditions classified elsewhere and of unspecified site, and Urinary frequency. Ms. Joyce Ellington  has a past surgical history that includes hx colonoscopy (); hx heart catheterization (); hx lap cholecystectomy (); hx lumbar laminectomy; hx tonsillectomy; hx  section; and hx cervical fusion (2019).   Social History/Living Environment:   Home Environment: Private residence  # Steps to Enter: 4  Rails to Enter: No  One/Two Story Residence: Two story, live on 1st floor  # of Interior Steps: 12  Living Alone: No  Support Systems: Spouse/Significant Other/Partner  Patient Expects to be Discharged to[de-identified] Private residence  Current DME Used/Available at Home: Sherral Meline, straight  Tub or Shower Type: Tub/Shower combination  Prior Level of Function/Work/Activity:  independent   Number of Personal Factors/Comorbidities that affect the Plan of Care: 0: LOW COMPLEXITY   EXAMINATION:   Most Recent Physical Functioning:      Gross Assessment  AROM: Within functional limits(except L knee)  Strength: Within functional limits(except L knee)          LLE AROM  L Knee Flexion: 60(approximate)  L Knee Extension: -15          Bed Mobility  Supine to Sit: Contact guard assistance  Sit to Supine: (left up in chair)  Scooting: Additional time    Transfers  Sit to Stand: Contact guard assistance  Stand to Sit: Contact guard assistance  Bed to Chair: Contact guard assistance  Toilet Transfers : Contact guard assistance    Balance  Sitting: Intact  Standing: With support              Weight Bearing Status  Left Side Weight Bearing: As tolerated  Distance (ft): 80 Feet (ft)  Ambulation - Level of Assistance: Contact guard assistance  Assistive Device: Walker, rolling  Speed/Amy: Slow  Step Length: Left shortened;Right shortened  Stance: Left decreased  Gait Abnormalities: Antalgic        Braces/Orthotics: none    Left Knee Cold  Type: Cryocuff      Body Structures Involved:  1. Bones  2. Joints  3. Muscles Body Functions Affected:  1. Neuromusculoskeletal  2. Movement Related Activities and Participation Affected:  1. General Tasks and Demands  2.  Mobility   Number of elements that affect the Plan of Care: 3: MODERATE COMPLEXITY   CLINICAL PRESENTATION:   Presentation: Stable and uncomplicated: LOW COMPLEXITY   CLINICAL DECISION MAKING:   [employer] AM-PAC 6 Clicks   Basic Mobility Inpatient Short Form  How much difficulty does the patient currently have. .. Unable A Lot A Little None   1. Turning over in bed (including adjusting bedclothes, sheets and blankets)? [] 1   [] 2   [] 3   [x] 4   2. Sitting down on and standing up from a chair with arms ( e.g., wheelchair, bedside commode, etc.)   [] 1   [] 2   [x] 3   [] 4   3. Moving from lying on back to sitting on the side of the bed? [] 1   [] 2   [x] 3   [] 4   How much help from another person does the patient currently need. .. Total A Lot A Little None   4. Moving to and from a bed to a chair (including a wheelchair)? [] 1   [] 2   [x] 3   [] 4   5. Need to walk in hospital room? [] 1   [] 2   [x] 3   [] 4   6. Climbing 3-5 steps with a railing? [] 1   [x] 2   [] 3   [] 4   © 2007, Trustees of [address] [place] [address] [address] [id number], under license to [employer]. All rights reserved     Score:  Initial: 18 Most Recent: X (Date: -- )    Interpretation of Tool:  Represents activities that are increasingly more difficult (i.e. Bed mobility, Transfers, Gait). Medical Necessity:     · Patient demonstrates   · good  ·  rehab potential due to higher previous functional level. Reason for Services/Other Comments:  · Patient   · continues to require present interventions due to patient's inability to perform exercises and functional mobility independently  · . Use of outcome tool(s) and clinical judgement create a POC that gives a: Clear prediction of patient's progress: LOW COMPLEXITY            TREATMENT:   (In addition to Assessment/Re-Assessment sessions the following treatments were rendered)     Pre-treatment Symptoms/Complaints:  L knee pain  Pain Initial:   Pain Intensity 1: 6  Pain Location 1: Knee  Pain Orientation 1: Left  Pain Intervention(s) 1: Cold pack, Repositioned  Post Session:  6     Therapeutic Exercise: (8 Minutes):  Exercises per grid below to improve mobility and strength. Required minimal verbal and manual cues to  perform exercises correctly .        Date:  7/16/20 Date:   Date: ACTIVITY/EXERCISE AM PM AM PM AM PM   GROUP THERAPY  []  []  []  []  []  []   Ankle Pumps  10       Quad Sets  10       Gluteal Sets  10       Hip ABd/ADduction  10       Straight Leg Raises  10       Knee Slides  10       Short Arc Quads         Long Arc Quads         Chair Slides                  B = bilateral; AA = active assistive; A = active; P = passive      Treatment/Session Assessment:     Response to Treatment:  tolerated well. Education:  [x] Home Exercises  [x] Fall Precautions  []  [] D/C Instruction Review  [] Knee Prosthesis Review  [x] Walker Management/Safety [] Adaptive Equipment as Needed       Interdisciplinary Collaboration:   o Physical Therapist  o Occupational Therapist  o Registered Nurse    After treatment position/precautions:   o Up in chair  o Bed/Chair-wheels locked  o Call light within reach  o RN notified  o Family at bedside    Compliance with Program/Exercises: Compliant all of the time, Will assess as treatment progresses. Recommendations/Intent for next treatment session:  Treatment next visit will focus on increasing Ms. Espinal's independence with bed mobility, transfers, gait training, strength/ROM exercises, modalities for pain, and patient education.       Total Treatment Duration:  PT Patient Time In/Time Out  Time In: 1405  Time Out: [address], PT

## 2020-07-16 NOTE — PERIOP NOTES
TRANSFER - IN REPORT:    Verbal report received from [address] (name) on Ryan Delong  being received from ortho (unit) for routine progression of care      Report consisted of patients Situation, Background, Assessment and   Recommendations(SBAR). Information from the following report(s) SBAR, OR Summary, Procedure Summary, Intake/Output, MAR, Recent Results, Pre Procedure Checklist, Procedure Verification and Quality Measures was reviewed with the receiving nurse. Opportunity for questions and clarification was provided. Assessment completed upon patients arrival to unit and care assumed.

## 2020-07-17 VITALS
RESPIRATION RATE: 16 BRPM | HEART RATE: 70 BPM | OXYGEN SATURATION: 98 % | DIASTOLIC BLOOD PRESSURE: 72 MMHG | SYSTOLIC BLOOD PRESSURE: 108 MMHG | BODY MASS INDEX: 32.7 KG/M2 | WEIGHT: 166.56 LBS | HEIGHT: 60 IN | TEMPERATURE: 98.4 F

## 2020-07-17 PROBLEM — Z96.652 TOTAL KNEE REPLACEMENT STATUS, LEFT: Status: ACTIVE | Noted: 2020-07-17

## 2020-07-17 PROCEDURE — 94760 N-INVAS EAR/PLS OXIMETRY 1: CPT

## 2020-07-17 PROCEDURE — 97110 THERAPEUTIC EXERCISES: CPT

## 2020-07-17 PROCEDURE — 97116 GAIT TRAINING THERAPY: CPT

## 2020-07-17 PROCEDURE — 74011250636 HC RX REV CODE- 250/636: Performed by: ORTHOPAEDIC SURGERY

## 2020-07-17 PROCEDURE — 74011000250 HC RX REV CODE- 250: Performed by: ORTHOPAEDIC SURGERY

## 2020-07-17 PROCEDURE — 97535 SELF CARE MNGMENT TRAINING: CPT

## 2020-07-17 PROCEDURE — 74011250637 HC RX REV CODE- 250/637: Performed by: ORTHOPAEDIC SURGERY

## 2020-07-17 RX ORDER — ASPIRIN 81 MG/1
81 TABLET ORAL EVERY 12 HOURS
Qty: 60 TAB | Refills: 0 | Status: SHIPPED | OUTPATIENT
Start: 2020-07-17 | End: 2020-08-16

## 2020-07-17 RX ORDER — HYDROMORPHONE HYDROCHLORIDE 2 MG/1
2-4 TABLET ORAL
Qty: 60 TAB | Refills: 0 | Status: SHIPPED | OUTPATIENT
Start: 2020-07-17 | End: 2020-07-24

## 2020-07-17 RX ORDER — PROMETHAZINE HYDROCHLORIDE 25 MG/1
25 TABLET ORAL
Qty: 40 TAB | Refills: 1 | Status: SHIPPED | OUTPATIENT
Start: 2020-07-17

## 2020-07-17 RX ADMIN — CELECOXIB 200 MG: 200 CAPSULE ORAL at 08:24

## 2020-07-17 RX ADMIN — WATER 2 G: 100 INJECTION, SOLUTION INTRAVENOUS at 00:06

## 2020-07-17 RX ADMIN — HYDROMORPHONE HYDROCHLORIDE 4 MG: 2 TABLET ORAL at 12:15

## 2020-07-17 RX ADMIN — PANTOPRAZOLE SODIUM 40 MG: 40 TABLET, DELAYED RELEASE ORAL at 06:49

## 2020-07-17 RX ADMIN — ASPIRIN 81 MG: 81 TABLET, COATED ORAL at 08:24

## 2020-07-17 RX ADMIN — Medication 1 AMPULE: at 08:25

## 2020-07-17 RX ADMIN — ACETAMINOPHEN 1000 MG: 500 TABLET, FILM COATED ORAL at 06:49

## 2020-07-17 RX ADMIN — HYDROMORPHONE HYDROCHLORIDE 4 MG: 2 TABLET ORAL at 08:24

## 2020-07-17 RX ADMIN — DOCUSATE SODIUM 50MG AND SENNOSIDES 8.6MG 2 TABLET: 8.6; 5 TABLET, FILM COATED ORAL at 08:24

## 2020-07-17 RX ADMIN — ACETAMINOPHEN 1000 MG: 500 TABLET, FILM COATED ORAL at 00:06

## 2020-07-17 RX ADMIN — HYDROMORPHONE HYDROCHLORIDE 4 MG: 2 TABLET ORAL at 04:14

## 2020-07-17 RX ADMIN — NITROFURANTOIN MACROCRYSTALS 100 MG: 50 CAPSULE ORAL at 08:24

## 2020-07-17 RX ADMIN — HYDROMORPHONE HYDROCHLORIDE 4 MG: 2 TABLET ORAL at 00:06

## 2020-07-17 NOTE — PROGRESS NOTES
Problem: Mobility Impaired (Adult and Pediatric)  Goal: *Acute Goals and Plan of Care (Insert Text)  Note: GOALS (1-4 days):  (1.)Ms. Espinal will move from supine to sit and sit to supine  in bed with STAND BY ASSIST.7/17  (2.)Ms. Espinal will transfer from bed to chair and chair to bed with STAND BY ASSIST using the least restrictive device. 7/17  (3.)Ms. Espinal will ambulate with STAND BY ASSIST for 200 feet with the least restrictive device. (4.)Ms. Espinal will ambulate up/down 4 steps with right railing with CONTACT GUARD ASSIST with no device. 7/17 with SBA  (5.)Ms. Espinal will increase left knee ROM to 5°-80°.  ________________________________________________________________________________________________      PHYSICAL THERAPY JOINT CAMP TKA: Daily Note, Discharge and PM 7/17/2020  INPATIENT: Hospital Day: 2  Payor: [employer] / Plan: Henry Crocker 74 / Product Type: Macel Christiano /      NAME/AGE/GENDER: Yan Brandt is a 64 y.o. female   PRIMARY DIAGNOSIS:  Unilateral primary osteoarthritis, left knee [M17.12]   Procedure(s) and Anesthesia Type:     * KNEE ARTHROPLASTY TOTAL/ LEFT/ DEPUY - Spinal (Left)  ICD-10: Treatment Diagnosis:    · Pain in Left Knee (M25.562)  · Stiffness of Left Knee, Not elsewhere classified (M25.662)  · Difficulty in walking, Not elsewhere classified (R26.2)  · Other abnormalities of gait and mobility (R26.89)      ASSESSMENT:     Ms. Robb Rizo presents with strength and ROM L LE and limited functional mobility S/P L TKA. Independent with gait and ADLs PTA. She will benefit from skilled therapy services to address the below problem list.   7/17 am walk 100 ft using RW with SBA and no LOB. Return to recliner and performs exercises without increase in pain. Remain in the recliner with needs in reach. 7/17 pm walk 30 ft to restroom, then another 30 ft to the wheelchair. Therapist rolled pt to the car. Therapist instructed pt on getting in/out of the car with SBA. All question answer and ready to leave. This section established at most recent assessment   PROBLEM LIST (Impairments causing functional limitations):  1. Decreased Strength  2. Decreased ADL/Functional Activities  3. Decreased Transfer Abilities  4. Decreased Ambulation Ability/Technique  5. Increased Pain  6. Decreased Flexibility/Joint Mobility  7. Decreased [place] with Home Exercise Program   INTERVENTIONS PLANNED: (Benefits and precautions of physical therapy have been discussed with the patient.)  1. bed mobility  2. gait training  3. home exercise program (HEP)  4. Range of Motion: active/assisted/passive  5. Therapeutic Activities  6. therapeutic exercise/strengthening  7. transfer training  8. Group Therapy     TREATMENT PLAN: Frequency/Duration: Follow patient BID for duration of hospital stay to address above goals. Rehabilitation Potential For Stated Goals: Good     RECOMMENDED REHABILITATION/EQUIPMENT: (at time of discharge pending progress): Continue Skilled Therapy and Home Health: Physical Therapy.               HISTORY:   History of Present Injury/Illness (Reason for Referral):  S/P L TKA  Past Medical History/Comorbidities:   Ms. Faby Cardoso  has a past medical history of Abdominal pain, epigastric, Abdominal pain, periumbilic, Absence of menstruation, Acute bronchitis (1/16/2015), Acute pancreatitis, Acute pharyngitis, Adverse effect of anesthesia, Anxiety, Anxiety state, unspecified, Backache, unspecified (1/16/2015), Bronchitis, not specified as acute or chronic, Cervicalgia, Chest pain (11/17/2015), Chondromalacia of patella, Chronic maxillary sinusitis, Chronic UTI, Contusion of toe, Cough, Disturbance of skin sensation, Dyspepsia and other specified disorders of function of stomach, Esophageal reflux (1/16/2015), GERD (gastroesophageal reflux disease), Hypersomnia due to medical condition (12/1/2014), Hypothyroidism (12/1/2014), Insomnia, unspecified, Irregular menstrual cycle (1/16/2015), Irritability, Lateral epicondylitis  of elbow, Lumbago, Migraine, unspecified, without mention of intractable migraine without mention of status migrainosus, Mixed hyperlipidemia (1/16/2015), Open wound of lip, without mention of complication, Open wound(s) (multiple) of unspecified site(s), without mention of complication, Other acute reactions to stress, Other and unspecified complications of medical care, not elsewhere classified, Other and unspecified hyperlipidemia, Other and unspecified mycoses, Other and unspecified noninfectious gastroenteritis and colitis(558.9) (1/16/2015), Other benign neoplasm of connective and other soft tissue of trunk, unspecified (1/16/2015), Other dysfunctions of sleep stages or arousal from sleep, Other joint derangement, not elsewhere classified, lower leg (1/16/2015), 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), Other specified symptom associated with female genital organs, Pain in joint, lower leg, Pain in joint, pelvic region and thigh, Pain in limb, Palpitations (11/17/2015), Persistent disorder of initiating or maintaining sleep (12/1/2014), Pneumonia, organism unspecified(486), PONV (postoperative nausea and vomiting), Postmenopausal atrophic vaginitis, Prolonged depressive reaction, Pure hypercholesterolemia (1/16/2015), Rosacea (1/16/2015), Sacroiliitis, not elsewhere classified ([facility] Snores, Spasm of muscle, Spondylosis of unspecified site without mention of myelopathy (1/16/2015), Sprain of ankle, unspecified site, Sprain of lumbar region, Symptomatic menopausal or female climacteric states, Synovial cyst of popliteal space (1/16/2015), Thoracic or lumbosacral neuritis or radiculitis, unspecified, Unspecified dermatitis due to sun (1/16/2015), Unspecified hypothyroidism (1/16/2015), Unspecified menopausal and postmenopausal disorder, Unspecified monoarthritis, ankle and foot, Unspecified pruritic disorder, Unspecified viral infection, in conditions classified elsewhere and of unspecified site, and Urinary frequency. Ms. Ronak Frank  has a past surgical history that includes hx colonoscopy (); hx heart catheterization (); hx lap cholecystectomy (); hx lumbar laminectomy; hx tonsillectomy; hx  section; and hx cervical fusion (2019). Social History/Living Environment:   Home Environment: Private residence  # Steps to Enter: 4  Rails to Enter: No  One/Two Story Residence: Two story, live on 1st floor  # of Interior Steps: 12  Living Alone: No  Support Systems: Spouse/Significant Other/Partner  Patient Expects to be Discharged to[de-identified] Private residence  Current DME Used/Available at Home: Cane, straight  Tub or Shower Type: Tub/Shower combination  Prior Level of Function/Work/Activity:  independent   Number of Personal Factors/Comorbidities that affect the Plan of Care: 0: LOW COMPLEXITY   EXAMINATION:   Most Recent Physical Functioning:                            Bed Mobility  Supine to Sit: Stand-by assistance    Transfers  Sit to Stand: Stand-by assistance  Stand to Sit: Stand-by assistance  Bed to Chair: Stand-by assistance  Car Transfer: Stand-by assistance    Balance  Sitting: Intact  Standing: With support              Weight Bearing Status  Left Side Weight Bearing: As tolerated  Distance (ft): 60 Feet (ft)  Ambulation - Level of Assistance: Stand-by assistance  Assistive Device: Walker, rolling  Speed/Amy: Slow  Step Length: Left shortened;Right shortened  Stance: Left decreased  Gait Abnormalities: Antalgic  Number of Stairs Trained: 12  Stairs - Level of Assistance: Stand-by assistance  Rail Use: Both     Braces/Orthotics: none    Left Knee Cold  Type: Cryocuff      Body Structures Involved:  1. Bones  2. Joints  3. Muscles Body Functions Affected:  1. Neuromusculoskeletal  2. Movement Related Activities and Participation Affected:  1. General Tasks and Demands  2.  Mobility   Number of elements that affect the Plan of Care: 3: MODERATE COMPLEXITY CLINICAL PRESENTATION:   Presentation: Stable and uncomplicated: LOW COMPLEXITY   CLINICAL DECISION MAKING:   [facility] [employer] AM-PAC 6 Clicks   Basic Mobility Inpatient Short Form  How much difficulty does the patient currently have. .. Unable A Lot A Little None   1. Turning over in bed (including adjusting bedclothes, sheets and blankets)? [] 1   [] 2   [] 3   [x] 4   2. Sitting down on and standing up from a chair with arms ( e.g., wheelchair, bedside commode, etc.)   [] 1   [] 2   [x] 3   [] 4   3. Moving from lying on back to sitting on the side of the bed? [] 1   [] 2   [x] 3   [] 4   How much help from another person does the patient currently need. .. Total A Lot A Little None   4. Moving to and from a bed to a chair (including a wheelchair)? [] 1   [] 2   [x] 3   [] 4   5. Need to walk in hospital room? [] 1   [] 2   [x] 3   [] 4   6. Climbing 3-5 steps with a railing? [] 1   [x] 2   [] 3   [] 4   © 2007, Trustees of [facility] [employer], under license to [employer]. All rights reserved     Score:  Initial: 18 Most Recent: X (Date: -- )    Interpretation of Tool:  Represents activities that are increasingly more difficult (i.e. Bed mobility, Transfers, Gait). Medical Necessity:     · Patient demonstrates   · good  ·  rehab potential due to higher previous functional level. Reason for Services/Other Comments:  · Patient   · continues to require present interventions due to patient's inability to perform exercises and functional mobility independently  · . Use of outcome tool(s) and clinical judgement create a POC that gives a: Clear prediction of patient's progress: LOW COMPLEXITY            TREATMENT:   (In addition to Assessment/Re-Assessment sessions the following treatments were rendered)     Pre-treatment Symptoms/Complaints:  I am ready to leave.   Pain Initial:   Pain Intensity 1: 0  Post Session:       Therapeutic Exercise: (0 Minutes):  Exercises per grid below to improve mobility and strength. Required minimal verbal and manual cues to  perform exercises correctly . Gait Training (23 Minutes):  Gait training to improve and/or restore physical functioning as related to mobility. Ambulated 60 Feet (ft) with Stand-by assistance using a Walker, rolling and minimal   related to their knee position and motion to promote proper body alignment. Date:  7/16/20 Date:  7/17   Date:     ACTIVITY/EXERCISE AM PM AM PM AM PM   GROUP THERAPY  []  []  []  []  []  []   Ankle Pumps  10 15      Quad Sets  10 15      Gluteal Sets  10 15      Hip ABd/ADduction  10 15      Straight Leg Raises  10 15      Knee Slides  10 15      Short Arc Quads   15      Long Arc Quads         Chair Slides                  B = bilateral; AA = active assistive; A = active; P = passive      Treatment/Session Assessment:     Response to Treatment:  tolerated well with gait and exercises. Education:  [x] Home Exercises  [x] Fall Precautions  []  [] D/C Instruction Review  [] Knee Prosthesis Review  [x] Walker Management/Safety [] Adaptive Equipment as Needed       Interdisciplinary Collaboration:   o Physical Therapy Assistant  o Registered Nurse    After treatment position/precautions:   o Up in chair  o Bed/Chair-wheels locked  o Call light within reach  o RN notified  o Family at bedside    Compliance with Program/Exercises: Compliant all of the time, Will assess as treatment progresses. Recommendations/Intent for next treatment session:  Treatment next visit will focus on increasing Ms. Espinal's independence with bed mobility, transfers, gait training, strength/ROM exercises, modalities for pain, and patient education.       Total Treatment Duration:  PT Patient Time In/Time Out  Time In: 1300  Time Out: 200 [facility] Gretchen Pitts PTA

## 2020-07-17 NOTE — PROGRESS NOTES
Problem: Self Care Deficits Care Plan (Adult)  Goal: *Acute Goals and Plan of Care (Insert Text)  Description: GOALS:   DISCHARGE GOALS (in preparation for going home/rehab):  3 days  1. Ms. Madison Bliss will perform one lower body dressing activity with stand by assistance required to demonstrate improved functional mobility and safety. 2.  Ms. Madison Bliss will perform one lower body bathing activity with stand by assistance required to demonstrate improved functional mobility and safety. 3.  Ms. Madison Bliss will perform toileting/toilet transfer with stand by assistance to demonstrate improved functional mobility and safety. 4.  Ms. Madison Bliss will perform shower transfer with stand by assistance to demonstrate improved functional mobility and safety. Outcome: MET     JOINT CAMP OCCUPATIONAL THERAPY TKA: Daily Note, Discharge, Treatment Day: 1st and AM 7/17/2020  INPATIENT: Hospital Day: 2  Payor: Anatoliy Hansen / Plan: Henry Crocker 74 / Product Type: Sharyle Alyse /      NAME/AGE/GENDER: Ryan Delong is a 64 y.o. female   PRIMARY DIAGNOSIS:  Unilateral primary osteoarthritis, left knee [M17.12]   Procedure(s) and Anesthesia Type:     * KNEE ARTHROPLASTY TOTAL/ LEFT/ DEPUY - Spinal (Left)  ICD-10: Treatment Diagnosis:    · Pain in Left Knee (M25.562)  · Stiffness of Left Knee, Not elsewhere classified (Q38.056)      ASSESSMENT:     Ms. Madison Bliss is s/p left TKA and presents with decreased weight bearing on left LE and decreased independence with functional mobility and activities of daily living as compared to baseline level of function and safety. Patient would benefit from skilled Occupational Therapy to maximize independence and safety with self-care task and functional mobility. Pt would also benefit from education on adaptive equipment and safety precautions in preparation for going home. Pt seen in room with  present. Pt with incontinence and wet bed, PCT notified for bed change.  Pt up to the bathroom for lower body bathing, toileting and dressing. Pt should progress well. Patient completed shower and dressing as charter below in ADL grid and is ambulating with rolling walker and supervision assist.  Patient has met 4/4 goals and plans to return home with good family support. Family able to provide patient with appropriate level of assistance at this time. OT reviewed safety precautions throughout session and therapy schedule for the remainder of today. Patient instructed to call for assistance when needing to get up from recliner and all needs in reach. Patient verbalized understanding of call light. This section established at most recent assessment   PROBLEM LIST (Impairments causing functional limitations):  1. Decreased Strength  2. Decreased ADL/Functional Activities  3. Decreased Transfer Abilities  4. Increased Pain  5. Increased Fatigue  6. Decreased Flexibility/Joint Mobility  7. Decreased Knowledge of Precautions   INTERVENTIONS PLANNED: (Benefits and precautions of occupational therapy have been discussed with the patient.)  1. Activities of daily living training  2. Adaptive equipment training  3. Balance training  4. Clothing management  5. Donning&doffing training  6. Theraputic activity     TREATMENT PLAN: Frequency/Duration: Follow patient 1-2 times to address above goals. Rehabilitation Potential For Stated Goals: Good     RECOMMENDED REHABILITATION/EQUIPMENT: (at time of discharge pending progress): Continue Skilled Therapy. OCCUPATIONAL PROFILE AND HISTORY:   History of Present Injury/Illness (Reason for Referral): Pt presents this date s/p (left) TKA.     Past Medical History/Comorbidities:   Ms. Concetta Segura  has a past medical history of Abdominal pain, epigastric, Abdominal pain, periumbilic, Absence of menstruation, Acute bronchitis (1/16/2015), Acute pancreatitis, Acute pharyngitis, Adverse effect of anesthesia, Anxiety, Anxiety state, unspecified, Backache, unspecified (1/16/2015), Bronchitis, not specified as acute or chronic, Cervicalgia, Chest pain (11/17/2015), Chondromalacia of patella, Chronic maxillary sinusitis, Chronic UTI, Contusion of toe, Cough, Disturbance of skin sensation, Dyspepsia and other specified disorders of function of stomach, Esophageal reflux (1/16/2015), GERD (gastroesophageal reflux disease), Hypersomnia due to medical condition (12/1/2014), Hypothyroidism (12/1/2014), Insomnia, unspecified, Irregular menstrual cycle (1/16/2015), Irritability, Lateral epicondylitis  of elbow, Lumbago, Migraine, unspecified, without mention of intractable migraine without mention of status migrainosus, Mixed hyperlipidemia (1/16/2015), Open wound of lip, without mention of complication, Open wound(s) (multiple) of unspecified site(s), without mention of complication, Other acute reactions to stress, Other and unspecified complications of medical care, not elsewhere classified, Other and unspecified hyperlipidemia, Other and unspecified mycoses, Other and unspecified noninfectious gastroenteritis and colitis(558.9) (1/16/2015), Other benign neoplasm of connective and other soft tissue of trunk, unspecified (1/16/2015), Other dysfunctions of sleep stages or arousal from sleep, Other joint derangement, not elsewhere classified, lower leg (1/16/2015), 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), Other specified symptom associated with female genital organs, Pain in joint, lower leg, Pain in joint, pelvic region and thigh, Pain in limb, Palpitations (11/17/2015), Persistent disorder of initiating or maintaining sleep (12/1/2014), Pneumonia, organism unspecified(486), PONV (postoperative nausea and vomiting), Postmenopausal atrophic vaginitis, Prolonged depressive reaction, Pure hypercholesterolemia (1/16/2015), Rosacea (1/16/2015), Sacroiliitis, not elsewhere classified ([address] Snores, Spasm of muscle, Spondylosis of unspecified site without mention of myelopathy (2015), Sprain of ankle, unspecified site, Sprain of lumbar region, Symptomatic menopausal or female climacteric states, Synovial cyst of popliteal space (2015), Thoracic or lumbosacral neuritis or radiculitis, unspecified, Unspecified dermatitis due to sun (2015), Unspecified hypothyroidism (2015), Unspecified menopausal and postmenopausal disorder, Unspecified monoarthritis, ankle and foot, Unspecified pruritic disorder, Unspecified viral infection, in conditions classified elsewhere and of unspecified site, and Urinary frequency. Ms. Julien Murcia  has a past surgical history that includes hx colonoscopy (); hx heart catheterization (); hx lap cholecystectomy (); hx lumbar laminectomy; hx tonsillectomy; hx  section; and hx cervical fusion (2019).   Social History/Living Environment:   Home Environment: Private residence  # Steps to Enter: 4  Rails to Enter: No  One/Two Story Residence: Two story, live on 1st floor  # of Interior Steps: 12  Living Alone: No  Support Systems: Spouse/Significant Other/Partner  Patient Expects to be Discharged to[de-identified] Private residence  Current DME Used/Available at Home: Cane, straight  Tub or Shower Type: Tub/Shower combination  Prior Level of Function/Work/Activity:  Independent      Number of Personal Factors/Comorbidities that affect the Plan of Care: Brief history (0):  LOW COMPLEXITY   ASSESSMENT OF OCCUPATIONAL PERFORMANCE[de-identified]   Most Recent Physical Functioning:   Balance  Sitting: Intact  Standing: Intact                                               Basic ADLs (From Assessment) Complex ADLs (From Assessment)   Basic ADL  Feeding: Independent  Oral Facial Hygiene/Grooming: Supervision  Bathing: Contact guard assistance  Type of Bath: Chlorhexidine (CHG), Full, Shower  Upper Body Dressing: Supervision  Lower Body Dressing: Minimum assistance  Toileting: Minimum assistance     Grooming/Bathing/Dressing Activities of Daily Living Grooming  Grooming Assistance: Supervision(standing at sink)     Upper Body Bathing  Bathing Assistance: Supervision     Lower Body Bathing  Bathing Assistance: Supervision(seated on shower chair) Toileting  Toileting Assistance: Supervision   Upper Body Dressing Assistance  Dressing Assistance: Supervision Functional Transfers  Bathroom Mobility: Supervision/set up  Toilet Transfer : Supervision  Shower Transfer: Supervision   [address]: Supervision Bed/Mat Mobility  Supine to Sit: Stand-by assistance  Sit to Stand: Stand-by assistance  Stand to Sit: Stand-by assistance  Bed to Chair: Stand-by assistance         Physical Skills Involved:  1. Range of Motion  2. Balance  3. Strength Cognitive Skills Affected (resulting in the inability to perform in a timely and safe manner): 1. none Psychosocial Skills Affected:  1. Environmental Adaptation   Number of elements that affect the Plan of Care: 3-5:  MODERATE COMPLEXITY   CLINICAL DECISION MAKIN[address] [place] [address] AM-PAC 6 Clicks   Daily Activity Inpatient Short Form  How much help from another person does the patient currently need. .. Total A Lot A Little None   1. Putting on and taking off regular lower body clothing? [] 1   [] 2   [] 3   [x] 4   2. Bathing (including washing, rinsing, drying)? [] 1   [] 2   [] 3   [x] 4   3. Toileting, which includes using toilet, bedpan or urinal?   [] 1   [] 2   [] 3   [x] 4   4. Putting on and taking off regular upper body clothing? [] 1   [] 2   [] 3   [x] 4   5. Taking care of personal grooming such as brushing teeth? [] 1   [] 2   [] 3   [x] 4   6. Eating meals? [] 1   [] 2   [] 3   [x] 4   © , Trustees of [address] [place] [address], under license to [employer]. All rights reserved     Score:  Initial: 21 Most Recent: 24 (Date: 2020 )    Interpretation of Tool:  Represents activities that are increasingly more difficult (i.e. Bed mobility, Transfers, Gait). Use of outcome tool(s) and clinical judgement create a POC that gives a: LOW COMPLEXITY            TREATMENT:   (In addition to Assessment/Re-Assessment sessions the following treatments were rendered)     Pre-treatment Symptoms/Complaints:  moves well with complaint of pain  Pain: Initial:   Pain Intensity 1: 0 6 Post Session:  6     Self Care: (40 min): Procedure(s) (per grid) utilized to improve and/or restore self-care/home management as related to dressing, bathing, toileting and grooming. Required minimal verbal and   cueing to facilitate activities of daily living skills and compensatory activities. Treatment/Session Assessment:     Response to Treatment:  pt up to shower tolerated well    Education:  [] Home Exercises  [x] Fall Precautions  [] Hip Precautions [] Going Home Video  [x] Knee/Hip Prosthesis Review  [x] Walker Management/Safety [x] Adaptive Equipment as Needed       Interdisciplinary Collaboration:   o Physical Therapist  o Occupational Therapist  o Registered Nurse    After treatment position/precautions:   o Up walking with PT       Compliance with Program/Exercises: Compliant all of the time, Will assess as treatment progresses. Recommendations/Intent for next treatment session:  Treatment next visit will focus on increasing Ms. Esipnal's independence with bed mobility, transfers, self care, functional mobility, modalities for pain, and patient education.       Total Treatment Duration:  OT Patient Time In/Time Out  Time In: 0930  Time Out: [address], OT

## 2020-07-17 NOTE — DISCHARGE INSTRUCTIONS
[address] [facility]   Patient Discharge Instructions    Jessica Barker / [id number] : 1960    Admitted 2020 Discharged: 2020     IF YOU HAVE ANY PROBLEMS ONCE YOU ARE AT HOME CALL THE FOLLOWING NUMBERS:   Main office number: [phone]    Take Home Medications     · It is important that you take the medication exactly as they are prescribed. · Keep your medication in the bottles provided by the pharmacist and keep a list of the medication names, dosages, and times to be taken in your wallet. · Do not take other medications without consulting your doctor. What to do at [address] your prehospital diet. If you have excessive nausea or vomitting call your doctor's office     Home Physical Therapy is arranged. Use rolling walker when walking. Patients who have had a joint replacement should not drive until you are seen for your follow up appointment by Dr. Flavia Borja. When to Call    - Call if you have a temperature greater then 101  - Unable to keep food down  - Loose control of your bladder or bowel function  - Are unable to bear any weight   - Need a pain medication refill       DISCHARGE SUMMARY from Nurse    The following personal items collected during your admission are returned to you:   Dental Appliance: Dental Appliances: None  Vision: Visual Aid: Glasses  Hearing Aid:    Jewelry: Jewelry: None  Clothing: Clothing: Other (comment)(2 BAGS OF BELONGINGS)  Other Valuables: Other Valuables: Cell Phone  Valuables sent to safe:      PATIENT INSTRUCTIONS:    After general anesthesia or intravenous sedation, for 24 hours or while taking prescription Narcotics:  · Limit your activities  · Do not drive and operate hazardous machinery  · Do not make important personal or business decisions  · Do  not drink alcoholic beverages  · If you have not urinated within 8 hours after discharge, please contact your surgeon on call.     Report the following to your surgeon:  · Excessive pain, swelling, redness or odor of or around the surgical area  · Temperature over 101  · Nausea and vomiting lasting longer than 4 hours or if unable to take medications  · Any signs of decreased circulation or nerve impairment to extremity: change in color, persistent  numbness, tingling, coldness or increase pain  · Any questions, call office @ [phone]      Keep scheduled follow up appointment. If need to change, call office @ [phone]. *  Please give a list of your current medications to your Primary Care Provider. *  Please update this list whenever your medications are discontinued, doses are      changed, or new medications (including over-the-counter products) are added. *  Please carry medication information at all times in case of emergency situations. Patient Education        Total Knee Replacement: What to Expect at [address] [place] [address] Drive had a total knee replacement. The doctor replaced the worn ends of the bones that connect to your knee (thighbone and lower leg bone) with plastic and metal parts. When you leave the hospital, you should be able to move around with a walker or crutches. But you will need someone to help you at home for the next few weeks or until you have more energy and can move around better. If you need more extensive rehab, you may go to a specialized rehab center for more treatment. You will go home with a bandage and stitches, staples, tissue glue, or tape strips. Change the bandage as your doctor tells you to. If you have stitches or staples, your doctor will remove them 10 to 21 days after your surgery. Glue or tape strips will fall off on their own over time. You may still have some mild pain, and the area may be swollen for 3 to 6 months after surgery. Your knee will continue to improve for 6 to 12 months. You will probably use a walker for 1 to 3 weeks and then use crutches. When you are ready, you can use a cane.  You will probably be able to walk on your own in 4 to 8 weeks. You will need to do months of physical rehabilitation (rehab) after a knee replacement. Rehab will help you strengthen the muscles of the knee and help you regain movement. After you recover, your artificial knee will allow you to do normal daily activities with less pain or no pain at all. You may be able to hike, dance, ride a bike, and play golf. Talk to your doctor about whether you can do more strenuous activities. Always tell your caregivers that you have an artificial knee. How long it will take to walk on your own, return to normal activities, and go back to work depends on your health and how well your rehabilitation (rehab) program goes. The better you do with your rehab exercises, the quicker you will get your strength and movement back. This care sheet gives you a general idea about how long it will take for you to recover. But each person recovers at a different pace. Follow the steps below to get better as quickly as possible. How can you care for yourself at home? Activity  · Rest when you feel tired. You may take a nap, but don't stay in bed all day. When you sit, use a chair with arms. You can use the arms to help you stand up. · Work with your physical therapist to find the best way to exercise. What you can do as your knee heals will depend on whether your new knee is cemented or uncemented. You may not be able to do certain things for a while if your new knee is uncemented. · After your knee has healed enough, you can do more strenuous activities with caution. ? You can golf, but use a golf cart. And don't wear shoes with spikes. ? You can bike on a flat road or on a stationary bike. Avoid biking up hills. ? Your doctor may suggest that you stay away from activities that put stress on your knee. These include tennis, badminton, squash, racquetball, contact sports like football, jumping (such as in basketball), jogging, and running. ?  Avoid activities where you might fall. These include horseback riding, skiing, and mountain biking. · Do not sit for more than 1 hour at a time. Get up and walk around for a while before you sit again. If you must sit for a long time, prop up your leg with a chair or footstool. This will help you avoid swelling. · Ask your doctor when you can drive again. It may take up to 8 weeks after knee replacement surgery before it's safe for you to drive. · When you get into a car, sit on the edge of the seat. Then pull in your legs, and turn to face the front. · You should be able to do many everyday activities 3 to 6 weeks after your surgery. You will probably need to take 4 to 16 weeks off from work. When you can go back to work depends on the type of work you do and how you feel. · Ask your doctor when it is okay for you to have sex. · For 12 weeks, do not lift anything heavier than 10 pounds and do not lift weights. Diet  · By the time you leave the hospital, you should be eating your normal diet. If your stomach is upset, try bland, low-fat foods like plain rice, broiled chicken, toast, and yogurt. Your doctor may suggest that you take iron and vitamin supplements. · Drink plenty of fluids (unless your doctor tells you not to). · Eat healthy foods, and watch your portion sizes. Try to stay at your ideal weight. Too much weight puts more stress on your new knee. · You may notice that your bowel movements are not regular right after your surgery. This is common. Try to avoid constipation and straining with bowel movements. You may want to take a fiber supplement every day. If you have not had a bowel movement after a couple of days, ask your doctor about taking a mild laxative. Medicines  · Your doctor will tell you if and when you can restart your medicines. He or she will also give you instructions about taking any new medicines.   · If you take aspirin or some other blood thinner, ask your doctor if and when to start taking it again. Make sure that you understand exactly what your doctor wants you to do. · Your doctor may give you a blood-thinning medicine to prevent blood clots. If you take a blood thinner, be sure you get instructions about how to take your medicine safely. Blood thinners can cause serious bleeding problems. This medicine could be in pill form or as a shot (injection). If a shot is needed, your doctor will tell you how to do this. · Be safe with medicines. Take pain medicines exactly as directed. ? If the doctor gave you a prescription medicine for pain, take it as prescribed. ? If you are not taking a prescription pain medicine, ask your doctor if you can take an over-the-counter medicine. ? Plan to take your pain medicine 30 minutes before exercises. It is easier to prevent pain before it starts than to stop it after it has started. · If you think your pain medicine is making you sick to your stomach:  ? Take your medicine after meals (unless your doctor has told you not to). ? Ask your doctor for a different pain medicine. · If your doctor prescribed antibiotics, take them as directed. Do not stop taking them just because you feel better. You need to take the full course of antibiotics. Incision care  · If your doctor told you how to care for your cut (incision), follow your doctor's instructions. You will have a dressing over the cut. A dressing helps the incision heal and protects it. Your doctor will tell you how to take care of this. · If you did not get instructions, follow this general advice:  ? If you have strips of tape on the cut the doctor made, leave the tape on for a week or until it falls off.  ? If you have stitches or staples, your doctor will tell you when to come back to have them removed. ? If you have skin adhesive on the cut, leave it on until it falls off. Skin adhesive is also called glue or liquid stitches. ? Change the bandage every day. ?  Wash the area daily with warm water, and pat it dry. Don't use hydrogen peroxide or alcohol. They can slow healing. ? You may cover the area with a gauze bandage if it oozes fluid or rubs against clothing. ? You may shower 24 to 48 hours after surgery. Pat the incision dry. Don't swim or take a bath for the first 2 weeks, or until your doctor tells you it is okay. Exercise  · Your rehab program will give you a number of exercises to do to help you get back your knee's range of motion and strength. Always do them as your therapist tells you. Ice  · For pain and swelling, put ice or a cold pack on the area for 10 to 20 minutes at a time. Put a thin cloth between the ice and your skin. Other instructions  · Keep wearing your support stockings as your doctor says. These help to prevent blood clots. How long you'll have to wear them depends on your activity level and the amount of swelling. · Carry a medical alert card that says you have an artificial joint. You have metal pieces in your knee. These may set off some airport metal detectors. Follow-up care is a key part of your treatment and safety. Be sure to make and go to all appointments, and call your doctor if you are having problems. It's also a good idea to know your test results and keep a list of the medicines you take. When should you call for help? [id number] anytime you think you may need emergency care. For example, call if:  · You passed out (lost consciousness). · You have severe trouble breathing. · You have sudden chest pain and shortness of breath, or you cough up blood. Call your doctor now or seek immediate medical care if:  · You have signs of infection, such as:  ? Increased pain, swelling, warmth, or redness. ? Red streaks leading from the incision. ? Pus draining from the incision. ? A fever. · You have signs of a blood clot, such as:  ? Pain in your calf, back of the knee, thigh, or groin. ? Redness and swelling in your leg or groin.   · Your incision comes open and begins to bleed, or the bleeding increases. · You have pain that does not get better after you take pain medicine. Watch closely for changes in your health, and be sure to contact your doctor if:  · You do not have a bowel movement after taking a laxative. Where can you learn more? Go to http://tao-ester.info/  Enter T054 in the search box to learn more about \"Total Knee Replacement: What to Expect at Home. \"  Current as of: March 2, 2020               Content Version: 12.5  © 8685-9144 [employer]. Care instructions adapted under license by [employer] (which disclaims liability or warranty for this information). If you have questions about a medical condition or this instruction, always ask your healthcare professional. [address] any warranty or liability for your use of this information. These are general instructions for a healthy lifestyle:    No smoking/ No tobacco products/ Avoid exposure to second hand smoke    Surgeon General's Warning:  Quitting smoking now greatly reduces serious risk to your health. Obesity, smoking, and sedentary lifestyle greatly increases your risk for illness    A healthy diet, regular physical exercise & weight monitoring are important for maintaining a healthy lifestyle    You may be retaining fluid if you have a history of heart failure or if you experience any of the following symptoms:  Weight gain of 3 pounds or more overnight or 5 pounds in a week, increased swelling in our hands or feet or shortness of breath while lying flat in bed. Please call your doctor as soon as you notice any of these symptoms; do not wait until your next office visit.     Recognize signs and symptoms of STROKE:    F-face looks uneven    A-arms unable to move or move even    S-speech slurred or non-existent    T-time-call 911 as soon as signs and symptoms begin-DO NOT go       Back to bed or wait to see if you get better-TIME IS BRAIN. The discharge information has been reviewed with the patient. The patient verbalized understanding. Information obtained by :  I understand that if any problems occur once I am at home I am to contact my physician. I understand and acknowledge receipt of the instructions indicated above.                                                                                                                                            Physician's or R.N.'s Signature                                                                  Date/Time                                                                                                                                              Patient or Representative Signature                                                          Date/Time

## 2020-07-17 NOTE — PROGRESS NOTES
07/16/20 2150   Oxygen Therapy   O2 Sat (%) 98 %   Pulse via Oximetry 87 beats per minute   O2 Device Room air   Pt connected to c/s monitor. No distress noted at this time.

## 2020-07-17 NOTE — PROGRESS NOTES
2020         Post Op day: 1 Day Post-Op     Admit Date: 2020  Admit Diagnosis: Unilateral primary osteoarthritis, left knee [M17.12]  Arthritis of knee, left [M17.12]        Subjective: Doing well, No complaints, No SOB, No Chest Pain, No Nausea or Vomiting     Objective:   Vital Signs are Stable, No Acute Distress, Alert and Oriented, Dressing is Dry,  Neurovascular exam is normal.     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    Hypothyroidism E03.9    Persistent disorder of initiating or maintaining sleep G47.00    Other benign neoplasm of connective and other soft tissue of trunk, unspecified D21.6    Other joint derangement, not elsewhere classified, lower leg M23.8X9    Osteoarthritis of spine M47.9    Other and unspecified noninfectious gastroenteritis and colitis(558.9) K52.9    Unspecified dermatitis due to sun L57.8    Acute bronchitis J20.9    Irregular menstrual cycle N92.6    Rosacea L71.9    Esophageal reflux K21.9    Unspecified hypothyroidism E03.9    Synovial cyst of popliteal space M71.20    Pure hypercholesterolemia E78.00    Backache, unspecified M54.9    Mixed hyperlipidemia E78.2    Palpitations R00.2    Chest pain R07.9    Cervical spinal stenosis M48.02    Stenosis of cervical spine M48.02    Arthritis of knee, left M17.12    Total knee replacement status, left Y46.989      Patient Vitals for the past 8 hrs:   BP Temp Pulse Resp SpO2   20 0400 110/81 98.1 °F (36.7 °C) 91 16 99 %   20 0048 129/87 98.2 °F (36.8 °C) 77 18 98 %    Temp (24hrs), Av.1 °F (36.7 °C), Min:97.9 °F (36.6 °C), Max:98.2 °F (36.8 °C)    Body mass index is 32.53 kg/m².     Lab Results   Component Value Date/Time    HGB 13.8 2020 06:17 PM      Pt seen by and discussed with [address] [place] [address] for home today        Signed By: Harvel Boxer, PA

## 2020-07-17 NOTE — PROGRESS NOTES
Problem: Mobility Impaired (Adult and Pediatric)  Goal: *Acute Goals and Plan of Care (Insert Text)  Note: GOALS (1-4 days):  (1.)Ms. Espinal will move from supine to sit and sit to supine  in bed with STAND BY ASSIST.  (2.)Ms. Espinal will transfer from bed to chair and chair to bed with STAND BY ASSIST using the least restrictive device. (3.)Ms. Espinal will ambulate with STAND BY ASSIST for 200 feet with the least restrictive device. (4.)Ms. Espinal will ambulate up/down 4 steps with right railing with CONTACT GUARD ASSIST with no device. (5.)Ms. Espinal will increase left knee ROM to 5°-80°.  ________________________________________________________________________________________________      PHYSICAL THERAPY JOINT CAMP TKA: Daily Note and AM 7/17/2020  INPATIENT: Hospital Day: 2  Payor: ANU[employer] / Plan: Henry Crocker 76 / Product Type: Sharyle Crock /      NAME/AGE/GENDER: Ryan Delong is a 64 y.o. female   PRIMARY DIAGNOSIS:  Unilateral primary osteoarthritis, left knee [M17.12]   Procedure(s) and Anesthesia Type:     * KNEE ARTHROPLASTY TOTAL/ LEFT/ DEPUY - Spinal (Left)  ICD-10: Treatment Diagnosis:    · Pain in Left Knee (M25.562)  · Stiffness of Left Knee, Not elsewhere classified (M25.662)  · Difficulty in walking, Not elsewhere classified (R26.2)  · Other abnormalities of gait and mobility (R26.89)      ASSESSMENT:     Ms. Madison Bliss presents with strength and ROM L LE and limited functional mobility S/P L TKA. Independent with gait and ADLs PTA. She will benefit from skilled therapy services to address the below problem list.   7/17 am walk 100 ft using RW with SBA and no LOB. Return to recliner and performs exercises without increase in pain. Remain in the recliner with needs in reach. This section established at most recent assessment   PROBLEM LIST (Impairments causing functional limitations):  1. Decreased Strength  2. Decreased ADL/Functional Activities  3.  Decreased Transfer Abilities  4. Decreased Ambulation Ability/Technique  5. Increased Pain  6. Decreased Flexibility/Joint Mobility  7. Decreased [place] with Home Exercise Program   INTERVENTIONS PLANNED: (Benefits and precautions of physical therapy have been discussed with the patient.)  1. bed mobility  2. gait training  3. home exercise program (HEP)  4. Range of Motion: active/assisted/passive  5. Therapeutic Activities  6. therapeutic exercise/strengthening  7. transfer training  8. Group Therapy     TREATMENT PLAN: Frequency/Duration: Follow patient BID for duration of hospital stay to address above goals. Rehabilitation Potential For Stated Goals: Good     RECOMMENDED REHABILITATION/EQUIPMENT: (at time of discharge pending progress): Continue Skilled Therapy and Home Health: Physical Therapy.               HISTORY:   History of Present Injury/Illness (Reason for Referral):  S/P L TKA  Past Medical History/Comorbidities:   Ms. Noy Partida  has a past medical history of Abdominal pain, epigastric, Abdominal pain, periumbilic, Absence of menstruation, Acute bronchitis (1/16/2015), Acute pancreatitis, Acute pharyngitis, Adverse effect of anesthesia, Anxiety, Anxiety state, unspecified, Backache, unspecified (1/16/2015), Bronchitis, not specified as acute or chronic, Cervicalgia, Chest pain (11/17/2015), Chondromalacia of patella, Chronic maxillary sinusitis, Chronic UTI, Contusion of toe, Cough, Disturbance of skin sensation, Dyspepsia and other specified disorders of function of stomach, Esophageal reflux (1/16/2015), GERD (gastroesophageal reflux disease), Hypersomnia due to medical condition (12/1/2014), Hypothyroidism (12/1/2014), Insomnia, unspecified, Irregular menstrual cycle (1/16/2015), Irritability, Lateral epicondylitis  of elbow, Lumbago, Migraine, unspecified, without mention of intractable migraine without mention of status migrainosus, Mixed hyperlipidemia (1/16/2015), Open wound of lip, without mention of complication, Open wound(s) (multiple) of unspecified site(s), without mention of complication, Other acute reactions to stress, Other and unspecified complications of medical care, not elsewhere classified, Other and unspecified hyperlipidemia, Other and unspecified mycoses, Other and unspecified noninfectious gastroenteritis and colitis(558.9) (2015), Other benign neoplasm of connective and other soft tissue of trunk, unspecified (2015), Other dysfunctions of sleep stages or arousal from sleep, Other joint derangement, not elsewhere classified, lower leg (2015), 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), Other specified symptom associated with female genital organs, Pain in joint, lower leg, Pain in joint, pelvic region and thigh, Pain in limb, Palpitations (2015), Persistent disorder of initiating or maintaining sleep (2014), Pneumonia, organism unspecified(486), PONV (postoperative nausea and vomiting), Postmenopausal atrophic vaginitis, Prolonged depressive reaction, Pure hypercholesterolemia (2015), Rosacea (2015), [facility]ili[facility], not elsewhere classified ([facility] Snores, Spasm of muscle, Spondylosis of unspecified site without mention of myelopathy (2015), Sprain of ankle, unspecified site, Sprain of lumbar region, Symptomatic menopausal or female climacteric states, Synovial cyst of popliteal space (2015), Thoracic or lumbosacral neuritis or radiculitis, unspecified, Unspecified dermatitis due to sun (2015), Unspecified hypothyroidism (2015), Unspecified menopausal and postmenopausal disorder, Unspecified monoarthritis, ankle and foot, Unspecified pruritic disorder, Unspecified viral infection, in conditions classified elsewhere and of unspecified site, and Urinary frequency.   Ms. Noy Partida  has a past surgical history that includes hx colonoscopy (); hx heart catheterization (); hx lap cholecystectomy (); hx lumbar laminectomy; hx tonsillectomy; hx  section; and hx cervical fusion (2019). Social History/Living Environment:   Home Environment: Private residence  # Steps to Enter: 4  Rails to Enter: No  One/Two Story Residence: Two story, live on 1st floor  # of Interior Steps: 12  Living Alone: No  Support Systems: Spouse/Significant Other/Partner  Patient Expects to be Discharged to[de-identified] Private residence  Current DME Used/Available at Home: Cane, straight  Tub or Shower Type: Tub/Shower combination  Prior Level of Function/Work/Activity:  independent   Number of Personal Factors/Comorbidities that affect the Plan of Care: 0: LOW COMPLEXITY   EXAMINATION:   Most Recent Physical Functioning:                            Bed Mobility  Supine to Sit: Stand-by assistance    Transfers  Sit to Stand: Stand-by assistance  Stand to Sit: Stand-by assistance  Bed to Chair: Stand-by assistance    Balance  Sitting: Intact  Standing: With support              Weight Bearing Status  Left Side Weight Bearing: As tolerated  Distance (ft): 100 Feet (ft)  Ambulation - Level of Assistance: Stand-by assistance  Assistive Device: Walker, rolling  Speed/Amy: Slow  Step Length: Left shortened;Right shortened  Stance: Left decreased  Gait Abnormalities: Antalgic        Braces/Orthotics: none    Left Knee Cold  Type: Cryocuff      Body Structures Involved:  1. Bones  2. Joints  3. Muscles Body Functions Affected:  1. Neuromusculoskeletal  2. Movement Related Activities and Participation Affected:  1. General Tasks and Demands  2. Mobility   Number of elements that affect the Plan of Care: 3: MODERATE COMPLEXITY   CLINICAL PRESENTATION:   Presentation: Stable and uncomplicated: LOW COMPLEXITY   CLINICAL DECISION MAKIN \A Chronology [facility] [place] [facility]\"" [address] [address] AM-PAC 6 Clicks   Basic Mobility Inpatient Short Form  How much difficulty does the patient currently have. .. Unable A Lot A Little None   1. Turning over in bed (including adjusting bedclothes, sheets and blankets)?    [] 1   [] 2   [] 3 [x] 4   2. Sitting down on and standing up from a chair with arms ( e.g., wheelchair, bedside commode, etc.)   [] 1   [] 2   [x] 3   [] 4   3. Moving from lying on back to sitting on the side of the bed? [] 1   [] 2   [x] 3   [] 4   How much help from another person does the patient currently need. .. Total A Lot A Little None   4. Moving to and from a bed to a chair (including a wheelchair)? [] 1   [] 2   [x] 3   [] 4   5. Need to walk in hospital room? [] 1   [] 2   [x] 3   [] 4   6. Climbing 3-5 steps with a railing? [] 1   [x] 2   [] 3   [] 4   © 2007, Trustees of [address] [place] [address] [address] [address], under license to [employer]. All rights reserved     Score:  Initial: 18 Most Recent: X (Date: -- )    Interpretation of Tool:  Represents activities that are increasingly more difficult (i.e. Bed mobility, Transfers, Gait). Medical Necessity:     · Patient demonstrates   · good  ·  rehab potential due to higher previous functional level. Reason for Services/Other Comments:  · Patient   · continues to require present interventions due to patient's inability to perform exercises and functional mobility independently  · . Use of outcome tool(s) and clinical judgement create a POC that gives a: Clear prediction of patient's progress: LOW COMPLEXITY            TREATMENT:   (In addition to Assessment/Re-Assessment sessions the following treatments were rendered)     Pre-treatment Symptoms/Complaints:  Doing fine   Pain Initial:   Pain Intensity 1: 0(3/10 after therapy)  Post Session:       Therapeutic Exercise: (15 Minutes):  Exercises per grid below to improve mobility and strength. Required minimal verbal and manual cues to  perform exercises correctly . Gait Training (15 Minutes):  Gait training to improve and/or restore physical functioning as related to mobility.   Ambulated 100 Feet (ft) with Stand-by assistance using a Walker, rolling and minimal   related to their knee position and motion to promote proper body alignment. Date:  7/16/20 Date:  7/17   Date:     ACTIVITY/EXERCISE AM PM AM PM AM PM   GROUP THERAPY  []  []  []  []  []  []   Ankle Pumps  10 15      Quad Sets  10 15      Gluteal Sets  10 15      Hip ABd/ADduction  10 15      Straight Leg Raises  10 15      Knee Slides  10 15      Short Arc Quads   15      Long Arc Quads         Chair Slides                  B = bilateral; AA = active assistive; A = active; P = passive      Treatment/Session Assessment:     Response to Treatment:  tolerated well with gait and exercises. Education:  [x] Home Exercises  [x] Fall Precautions  []  [] D/C Instruction Review  [] Knee Prosthesis Review  [x] Walker Management/Safety [] Adaptive Equipment as Needed       Interdisciplinary Collaboration:   o Physical Therapy Assistant  o Registered Nurse    After treatment position/precautions:   o Up in chair  o Bed/Chair-wheels locked  o Call light within reach  o RN notified  o Family at bedside    Compliance with Program/Exercises: Compliant all of the time, Will assess as treatment progresses. Recommendations/Intent for next treatment session:  Treatment next visit will focus on increasing Ms. Espinal's independence with bed mobility, transfers, gait training, strength/ROM exercises, modalities for pain, and patient education.       Total Treatment Duration:  PT Patient Time In/Time Out  Time In: 0830  Time Out: 0900    Priscilla Pitts, PTA

## 2020-07-17 NOTE — PROGRESS NOTES
Shift assessment complete. Patient is alert and oriented. Pedal pulses are present, Aquacel dressing is clean, dry and intact. No needs expressed at this time. Bed low and locked. Call light within reach.

## 2020-07-17 NOTE — DISCHARGE SUMMARY
[address] [place] [address]  Total Joint Discharge Summary      Patient ID:  Chidi Hernandez  [id number]  76 y.o.  1960    Admit date: 7/16/2020  Discharge date and time: 7-17-20  Admitting Physician: Efraín Alcantar MD  Surgeon: Same  Admission Diagnoses: Unilateral primary osteoarthritis, left knee [M17.12]  Arthritis of knee, left [M17.12]  Discharge Diagnoses: Principal Problem: Total knee replacement status, left (7/17/2020)    Active Problems:    Arthritis of knee, left (7/16/2020)                                Perioperative Antibiotics: Ancef 1 to 2 mg was given depending on patient's weight. If allergic to Ancef or due to other indications, patient was given Vancomycin. Hospital Medications given:   [unfilled]  [unfilled]  [unfilled]    Discharge Medications given:  Current Discharge Medication List      START taking these medications    Details   aspirin delayed-release 81 mg tablet Take 1 Tab by mouth every twelve (12) hours every twelve (12) hours for 30 days. Qty: 60 Tab, Refills: 0      HYDROmorphone (DILAUDID) 2 mg tablet Take 1-2 Tabs by mouth every four (4) hours as needed for Pain for up to 7 days. Max Daily Amount: 24 mg. Qty: 60 Tab, Refills: 0    Associated Diagnoses: Total knee replacement status, left         CONTINUE these medications which have CHANGED    Details   promethazine (PHENERGAN) 25 mg tablet Take 1 Tab by mouth every six (6) hours as needed for Nausea. Qty: 40 Tab, Refills: 1         CONTINUE these medications which have NOT CHANGED    Details   pantoprazole (PROTONIX) 40 mg tablet Take 40 mg by mouth two (2) times a day. nitrofurantoin (MACRODANTIN) 100 mg capsule Take 100 mg by mouth daily. !! levothyroxine (SYNTHROID) 150 mcg tablet Take 1 Tab by mouth Daily (before breakfast).  Name Brand ONLY  Qty: 90 Tab, Refills: 1    Comments: Name Brand Synthroid ONLY  Associated Diagnoses: Hypothyroidism due to acquired atrophy of thyroid      !! levothyroxine (SYNTHROID) 175 mcg tablet Take 1 Tab by mouth Daily (before breakfast). Branded Synthroid only: 175mcg on odd days and 150 on even days  Qty: 45 Tab, Refills: 3      fluticasone (FLONASE) 50 mcg/actuation nasal spray 2 sprays ea. Side daily  Qty: 3 Bottle, Refills: 3      clonazePAM (KLONOPIN) 1 mg tablet Take 1 Tab by mouth daily. Max Daily Amount: 1 mg. Qty: 90 Tab, Refills: 1       !! - Potential duplicate medications found. Please discuss with provider. STOP taking these medications       celecoxib (CELEBREX) 200 mg capsule Comments:   Reason for Stopping:         estrogens, conjugated,-methylTESTOSTERone (ESTRATEST HS) 0.625-1.25 mg per tablet Comments:   Reason for Stopping:         medroxyPROGESTERone (PROVERA) 2.5 mg tablet Comments:   Reason for Stopping:         metroNIDAZOLE (METROGEL) 1 % topical gel Comments:   Reason for Stopping:                Additional DVT Prophylaxis:  MICKY Hose,Plexi-Pulse    Postoperative transfusions:   none  Post Op complications: none    Hemoglobin at discharge:   Lab Results   Component Value Date/Time    HGB 13.8 07/16/2020 06:17 PM       Wound appears to be healing without any evidence of infection. Physical Therapy started on the day following surgery and progressed to independent ambulation with the aid of a walker. At the time of discharge, able to go up and down stairs and had understanding of precautions needed following surgery. PT/OT:         Activity Response:  Tolerated well  Assistive Device: Walker (comment)        LLE AROM  L Knee Flexion: 60(approximate)  L Knee Extension: -15       Discharged to: home    Discharge instructions:  -Rx pain medication given  - Anticoagulate with: Ecotrin 81 mg PO BID x 4 weeks  -Resume pre hospital diet             -Resume home medications per medical continuation form     -Ambulate with walker, appropriate total joint protocol  -Follow up in office as scheduled       Signed:  Jihan Sanderson PA  7/17/2020  7:33 AM

## 2020-07-18 ENCOUNTER — HOME CARE VISIT (OUTPATIENT)
Dept: SCHEDULING | Facility: HOME HEALTH | Age: 60
End: 2020-07-18
Payer: OTHER GOVERNMENT

## 2020-07-18 VITALS
TEMPERATURE: 99.2 F | HEART RATE: 90 BPM | RESPIRATION RATE: 16 BRPM | DIASTOLIC BLOOD PRESSURE: 80 MMHG | SYSTOLIC BLOOD PRESSURE: 112 MMHG

## 2020-07-18 PROCEDURE — 3331090001 HH PPS REVENUE CREDIT

## 2020-07-18 PROCEDURE — 3331090002 HH PPS REVENUE DEBIT

## 2020-07-18 PROCEDURE — G0151 HHCP-SERV OF PT,EA 15 MIN: HCPCS

## 2020-07-18 PROCEDURE — 400013 HH SOC

## 2020-07-19 PROCEDURE — 3331090001 HH PPS REVENUE CREDIT

## 2020-07-19 PROCEDURE — 3331090002 HH PPS REVENUE DEBIT

## 2020-07-20 PROCEDURE — 3331090002 HH PPS REVENUE DEBIT

## 2020-07-20 PROCEDURE — 3331090001 HH PPS REVENUE CREDIT

## 2020-07-21 ENCOUNTER — HOME CARE VISIT (OUTPATIENT)
Dept: SCHEDULING | Facility: HOME HEALTH | Age: 60
End: 2020-07-21
Payer: OTHER GOVERNMENT

## 2020-07-21 VITALS
SYSTOLIC BLOOD PRESSURE: 160 MMHG | TEMPERATURE: 97.9 F | RESPIRATION RATE: 16 BRPM | DIASTOLIC BLOOD PRESSURE: 86 MMHG | HEART RATE: 84 BPM

## 2020-07-21 PROCEDURE — G0151 HHCP-SERV OF PT,EA 15 MIN: HCPCS

## 2020-07-21 PROCEDURE — 3331090002 HH PPS REVENUE DEBIT

## 2020-07-21 PROCEDURE — 3331090001 HH PPS REVENUE CREDIT

## 2020-07-22 PROCEDURE — 3331090001 HH PPS REVENUE CREDIT

## 2020-07-22 PROCEDURE — 3331090002 HH PPS REVENUE DEBIT

## 2020-07-23 ENCOUNTER — HOME CARE VISIT (OUTPATIENT)
Dept: SCHEDULING | Facility: HOME HEALTH | Age: 60
End: 2020-07-23
Payer: OTHER GOVERNMENT

## 2020-07-23 VITALS
DIASTOLIC BLOOD PRESSURE: 82 MMHG | RESPIRATION RATE: 18 BRPM | SYSTOLIC BLOOD PRESSURE: 131 MMHG | TEMPERATURE: 97.8 F | HEART RATE: 72 BPM

## 2020-07-23 PROCEDURE — 3331090001 HH PPS REVENUE CREDIT

## 2020-07-23 PROCEDURE — G0157 HHC PT ASSISTANT EA 15: HCPCS

## 2020-07-23 PROCEDURE — 3331090002 HH PPS REVENUE DEBIT

## 2020-07-24 ENCOUNTER — HOME CARE VISIT (OUTPATIENT)
Dept: SCHEDULING | Facility: HOME HEALTH | Age: 60
End: 2020-07-24
Payer: OTHER GOVERNMENT

## 2020-07-24 PROCEDURE — G0157 HHC PT ASSISTANT EA 15: HCPCS

## 2020-07-24 PROCEDURE — 3331090002 HH PPS REVENUE DEBIT

## 2020-07-24 PROCEDURE — 3331090001 HH PPS REVENUE CREDIT

## 2020-07-25 PROCEDURE — 3331090002 HH PPS REVENUE DEBIT

## 2020-07-25 PROCEDURE — 3331090001 HH PPS REVENUE CREDIT

## 2020-07-26 VITALS
DIASTOLIC BLOOD PRESSURE: 71 MMHG | SYSTOLIC BLOOD PRESSURE: 112 MMHG | TEMPERATURE: 98.4 F | HEART RATE: 72 BPM | RESPIRATION RATE: 17 BRPM

## 2020-07-26 PROCEDURE — 3331090001 HH PPS REVENUE CREDIT

## 2020-07-26 PROCEDURE — 3331090002 HH PPS REVENUE DEBIT

## 2020-07-27 PROCEDURE — 3331090002 HH PPS REVENUE DEBIT

## 2020-07-27 PROCEDURE — 3331090001 HH PPS REVENUE CREDIT

## 2020-07-28 ENCOUNTER — HOME CARE VISIT (OUTPATIENT)
Dept: SCHEDULING | Facility: HOME HEALTH | Age: 60
End: 2020-07-28
Payer: OTHER GOVERNMENT

## 2020-07-28 PROCEDURE — 3331090001 HH PPS REVENUE CREDIT

## 2020-07-28 PROCEDURE — 3331090002 HH PPS REVENUE DEBIT

## 2020-07-28 PROCEDURE — G0151 HHCP-SERV OF PT,EA 15 MIN: HCPCS

## 2020-07-29 ENCOUNTER — HOME CARE VISIT (OUTPATIENT)
Dept: SCHEDULING | Facility: HOME HEALTH | Age: 60
End: 2020-07-29
Payer: OTHER GOVERNMENT

## 2020-07-29 VITALS
RESPIRATION RATE: 16 BRPM | HEART RATE: 88 BPM | DIASTOLIC BLOOD PRESSURE: 75 MMHG | SYSTOLIC BLOOD PRESSURE: 130 MMHG | TEMPERATURE: 97.9 F

## 2020-07-29 VITALS
DIASTOLIC BLOOD PRESSURE: 70 MMHG | RESPIRATION RATE: 18 BRPM | TEMPERATURE: 98.3 F | SYSTOLIC BLOOD PRESSURE: 108 MMHG | HEART RATE: 84 BPM

## 2020-07-29 PROCEDURE — 3331090001 HH PPS REVENUE CREDIT

## 2020-07-29 PROCEDURE — 3331090002 HH PPS REVENUE DEBIT

## 2020-07-29 PROCEDURE — G0151 HHCP-SERV OF PT,EA 15 MIN: HCPCS

## 2020-07-30 PROCEDURE — 3331090001 HH PPS REVENUE CREDIT

## 2020-07-30 PROCEDURE — 3331090002 HH PPS REVENUE DEBIT

## 2020-07-31 PROCEDURE — 3331090001 HH PPS REVENUE CREDIT

## 2020-07-31 PROCEDURE — 3331090002 HH PPS REVENUE DEBIT

## 2020-08-01 ENCOUNTER — HOME CARE VISIT (OUTPATIENT)
Dept: SCHEDULING | Facility: HOME HEALTH | Age: 60
End: 2020-08-01
Payer: OTHER GOVERNMENT

## 2020-08-01 PROCEDURE — 3331090001 HH PPS REVENUE CREDIT

## 2020-08-01 PROCEDURE — G0157 HHC PT ASSISTANT EA 15: HCPCS

## 2020-08-01 PROCEDURE — 3331090002 HH PPS REVENUE DEBIT

## 2020-08-02 VITALS
DIASTOLIC BLOOD PRESSURE: 90 MMHG | RESPIRATION RATE: 17 BRPM | HEART RATE: 73 BPM | SYSTOLIC BLOOD PRESSURE: 146 MMHG | TEMPERATURE: 98.6 F

## 2020-08-02 PROCEDURE — 3331090001 HH PPS REVENUE CREDIT

## 2020-08-02 PROCEDURE — 3331090002 HH PPS REVENUE DEBIT

## 2020-08-03 ENCOUNTER — HOME CARE VISIT (OUTPATIENT)
Dept: SCHEDULING | Facility: HOME HEALTH | Age: 60
End: 2020-08-03
Payer: OTHER GOVERNMENT

## 2020-08-03 VITALS
HEART RATE: 72 BPM | TEMPERATURE: 97.2 F | SYSTOLIC BLOOD PRESSURE: 122 MMHG | DIASTOLIC BLOOD PRESSURE: 80 MMHG | RESPIRATION RATE: 18 BRPM

## 2020-08-03 PROCEDURE — G0157 HHC PT ASSISTANT EA 15: HCPCS

## 2020-08-03 PROCEDURE — 3331090001 HH PPS REVENUE CREDIT

## 2020-08-03 PROCEDURE — 3331090002 HH PPS REVENUE DEBIT

## 2020-08-04 PROCEDURE — 3331090001 HH PPS REVENUE CREDIT

## 2020-08-04 PROCEDURE — 3331090002 HH PPS REVENUE DEBIT

## 2020-08-05 ENCOUNTER — HOME CARE VISIT (OUTPATIENT)
Dept: SCHEDULING | Facility: HOME HEALTH | Age: 60
End: 2020-08-05
Payer: OTHER GOVERNMENT

## 2020-08-05 VITALS
HEART RATE: 69 BPM | RESPIRATION RATE: 18 BRPM | DIASTOLIC BLOOD PRESSURE: 72 MMHG | TEMPERATURE: 97.9 F | SYSTOLIC BLOOD PRESSURE: 118 MMHG

## 2020-08-05 PROCEDURE — G0157 HHC PT ASSISTANT EA 15: HCPCS

## 2020-08-05 PROCEDURE — 3331090002 HH PPS REVENUE DEBIT

## 2020-08-05 PROCEDURE — 3331090001 HH PPS REVENUE CREDIT

## 2020-08-06 PROCEDURE — 3331090002 HH PPS REVENUE DEBIT

## 2020-08-06 PROCEDURE — 3331090001 HH PPS REVENUE CREDIT

## 2020-08-07 ENCOUNTER — HOME CARE VISIT (OUTPATIENT)
Dept: SCHEDULING | Facility: HOME HEALTH | Age: 60
End: 2020-08-07
Payer: OTHER GOVERNMENT

## 2020-08-07 VITALS
RESPIRATION RATE: 16 BRPM | HEART RATE: 87 BPM | SYSTOLIC BLOOD PRESSURE: 106 MMHG | TEMPERATURE: 97.9 F | DIASTOLIC BLOOD PRESSURE: 60 MMHG

## 2020-08-07 PROCEDURE — 3331090002 HH PPS REVENUE DEBIT

## 2020-08-07 PROCEDURE — 3331090001 HH PPS REVENUE CREDIT

## 2020-08-07 PROCEDURE — G0151 HHCP-SERV OF PT,EA 15 MIN: HCPCS

## 2020-08-07 PROCEDURE — A4649 SURGICAL SUPPLIES: HCPCS

## 2020-08-08 PROCEDURE — 3331090001 HH PPS REVENUE CREDIT

## 2020-08-08 PROCEDURE — 3331090002 HH PPS REVENUE DEBIT

## 2020-08-09 PROCEDURE — 3331090001 HH PPS REVENUE CREDIT

## 2020-08-09 PROCEDURE — 3331090002 HH PPS REVENUE DEBIT

## 2020-09-07 PROCEDURE — A4649 SURGICAL SUPPLIES: HCPCS

## 2021-12-14 ENCOUNTER — APPOINTMENT (OUTPATIENT)
Dept: GENERAL RADIOLOGY | Age: 61
End: 2021-12-14
Attending: EMERGENCY MEDICINE
Payer: OTHER GOVERNMENT

## 2021-12-14 ENCOUNTER — APPOINTMENT (OUTPATIENT)
Dept: CT IMAGING | Age: 61
End: 2021-12-14
Attending: PHYSICIAN ASSISTANT
Payer: OTHER GOVERNMENT

## 2021-12-14 ENCOUNTER — HOSPITAL ENCOUNTER (EMERGENCY)
Age: 61
Discharge: HOME | End: 2021-12-15
Attending: EMERGENCY MEDICINE
Payer: OTHER GOVERNMENT

## 2021-12-14 DIAGNOSIS — R07.9 CHEST PAIN, UNSPECIFIED TYPE: Primary | ICD-10-CM

## 2021-12-14 LAB
ALBUMIN SERPL-MCNC: 3 G/DL (ref 3.5–5)
ALBUMIN/GLOB SERPL: 0.9 {RATIO} (ref 1.2–3.5)
ALP SERPL-CCNC: 82 U/L (ref 50–136)
ALT SERPL-CCNC: 46 U/L (ref 12–65)
ANION GAP SERPL CALC-SCNC: 4 MMOL/L (ref 7–16)
AST SERPL-CCNC: 22 U/L (ref 15–37)
ATRIAL RATE: 72 BPM
BASOPHILS # BLD: 0 K/UL (ref 0–0.2)
BASOPHILS NFR BLD: 1 % (ref 0–2)
BILIRUB SERPL-MCNC: 0.2 MG/DL (ref 0.2–1.1)
BNP SERPL-MCNC: 107 PG/ML (ref 5–125)
BUN SERPL-MCNC: 15 MG/DL (ref 6–23)
CALCIUM SERPL-MCNC: 8.4 MG/DL (ref 8.3–10.4)
CALCULATED P AXIS, ECG09: 42 DEGREES
CALCULATED R AXIS, ECG10: 66 DEGREES
CALCULATED T AXIS, ECG11: 6 DEGREES
CHLORIDE SERPL-SCNC: 111 MMOL/L (ref 98–107)
CO2 SERPL-SCNC: 29 MMOL/L (ref 21–32)
COVID-19 RAPID TEST, COVR: NOT DETECTED
CREAT SERPL-MCNC: 0.82 MG/DL (ref 0.6–1)
DIAGNOSIS, 93000: NORMAL
DIFFERENTIAL METHOD BLD: ABNORMAL
EOSINOPHIL # BLD: 0.1 K/UL (ref 0–0.8)
EOSINOPHIL NFR BLD: 2 % (ref 0.5–7.8)
ERYTHROCYTE [DISTWIDTH] IN BLOOD BY AUTOMATED COUNT: 11.8 % (ref 11.9–14.6)
GLOBULIN SER CALC-MCNC: 3.4 G/DL (ref 2.3–3.5)
GLUCOSE SERPL-MCNC: 103 MG/DL (ref 65–100)
HCT VFR BLD AUTO: 38.5 % (ref 35.8–46.3)
HGB BLD-MCNC: 12.9 G/DL (ref 11.7–15.4)
IMM GRANULOCYTES # BLD AUTO: 0.1 K/UL (ref 0–0.5)
IMM GRANULOCYTES NFR BLD AUTO: 2 % (ref 0–5)
LIPASE SERPL-CCNC: 351 U/L (ref 73–393)
LYMPHOCYTES # BLD: 2.7 K/UL (ref 0.5–4.6)
LYMPHOCYTES NFR BLD: 31 % (ref 13–44)
MAGNESIUM SERPL-MCNC: 2.1 MG/DL (ref 1.8–2.4)
MCH RBC QN AUTO: 31.9 PG (ref 26.1–32.9)
MCHC RBC AUTO-ENTMCNC: 33.5 G/DL (ref 31.4–35)
MCV RBC AUTO: 95.3 FL (ref 79.6–97.8)
MONOCYTES # BLD: 0.7 K/UL (ref 0.1–1.3)
MONOCYTES NFR BLD: 8 % (ref 4–12)
NEUTS SEG # BLD: 4.9 K/UL (ref 1.7–8.2)
NEUTS SEG NFR BLD: 57 % (ref 43–78)
NRBC # BLD: 0 K/UL (ref 0–0.2)
P-R INTERVAL, ECG05: 104 MS
PLATELET # BLD AUTO: 233 K/UL (ref 150–450)
PMV BLD AUTO: 11.6 FL (ref 9.4–12.3)
POTASSIUM SERPL-SCNC: 3.8 MMOL/L (ref 3.5–5.1)
PROT SERPL-MCNC: 6.4 G/DL (ref 6.3–8.2)
Q-T INTERVAL, ECG07: 370 MS
QRS DURATION, ECG06: 78 MS
QTC CALCULATION (BEZET), ECG08: 405 MS
RBC # BLD AUTO: 4.04 M/UL (ref 4.05–5.2)
SODIUM SERPL-SCNC: 144 MMOL/L (ref 136–145)
SOURCE, COVRS: NORMAL
TROPONIN-HIGH SENSITIVITY: 4.3 PG/ML (ref 0–14)
TROPONIN-HIGH SENSITIVITY: 4.6 PG/ML (ref 0–14)
VENTRICULAR RATE, ECG03: 72 BPM
WBC # BLD AUTO: 8.5 K/UL (ref 4.3–11.1)

## 2021-12-14 PROCEDURE — 87635 SARS-COV-2 COVID-19 AMP PRB: CPT

## 2021-12-14 PROCEDURE — 83880 ASSAY OF NATRIURETIC PEPTIDE: CPT

## 2021-12-14 PROCEDURE — 85025 COMPLETE CBC W/AUTO DIFF WBC: CPT

## 2021-12-14 PROCEDURE — 74011250637 HC RX REV CODE- 250/637: Performed by: EMERGENCY MEDICINE

## 2021-12-14 PROCEDURE — 93005 ELECTROCARDIOGRAM TRACING: CPT | Performed by: EMERGENCY MEDICINE

## 2021-12-14 PROCEDURE — 99284 EMERGENCY DEPT VISIT MOD MDM: CPT

## 2021-12-14 PROCEDURE — 80053 COMPREHEN METABOLIC PANEL: CPT

## 2021-12-14 PROCEDURE — 71046 X-RAY EXAM CHEST 2 VIEWS: CPT

## 2021-12-14 PROCEDURE — 74011000258 HC RX REV CODE- 258: Performed by: EMERGENCY MEDICINE

## 2021-12-14 PROCEDURE — 83690 ASSAY OF LIPASE: CPT

## 2021-12-14 PROCEDURE — 71260 CT THORAX DX C+: CPT

## 2021-12-14 PROCEDURE — 74011000636 HC RX REV CODE- 636: Performed by: EMERGENCY MEDICINE

## 2021-12-14 PROCEDURE — 84484 ASSAY OF TROPONIN QUANT: CPT

## 2021-12-14 PROCEDURE — 83735 ASSAY OF MAGNESIUM: CPT

## 2021-12-14 RX ORDER — SODIUM CHLORIDE 0.9 % (FLUSH) 0.9 %
5-10 SYRINGE (ML) INJECTION AS NEEDED
Status: DISCONTINUED | OUTPATIENT
Start: 2021-12-14 | End: 2021-12-15 | Stop reason: HOSPADM

## 2021-12-14 RX ORDER — SODIUM CHLORIDE 0.9 % (FLUSH) 0.9 %
10 SYRINGE (ML) INJECTION
Status: COMPLETED | OUTPATIENT
Start: 2021-12-14 | End: 2021-12-14

## 2021-12-14 RX ORDER — GUAIFENESIN 100 MG/5ML
324 LIQUID (ML) ORAL
Status: COMPLETED | OUTPATIENT
Start: 2021-12-14 | End: 2021-12-14

## 2021-12-14 RX ORDER — SODIUM CHLORIDE 0.9 % (FLUSH) 0.9 %
5-10 SYRINGE (ML) INJECTION EVERY 8 HOURS
Status: DISCONTINUED | OUTPATIENT
Start: 2021-12-14 | End: 2021-12-15 | Stop reason: HOSPADM

## 2021-12-14 RX ADMIN — SODIUM CHLORIDE 100 ML: 900 INJECTION, SOLUTION INTRAVENOUS at 23:41

## 2021-12-14 RX ADMIN — IOPAMIDOL 100 ML: 755 INJECTION, SOLUTION INTRAVENOUS at 23:41

## 2021-12-14 RX ADMIN — Medication 10 ML: at 23:41

## 2021-12-14 RX ADMIN — Medication 5 ML: at 22:00

## 2021-12-14 RX ADMIN — ASPIRIN 81 MG 324 MG: 81 TABLET ORAL at 22:53

## 2021-12-14 NOTE — ED TRIAGE NOTES
Patient presents with complaints of chest pain that radiates to left arm. Patient in addition with complaints of shortness of breath and dizziness. Patient states that she was seen at her dr. Reno Young at 1130 this morning and was given a nitro in the primary care dr. Reno Young. [facility]. Patient states that she has had an upper respiratory infection and was on antibiotics and now she has been on steroids for the past couple days for diagnosis of bronchitis.

## 2021-12-15 VITALS
HEIGHT: 60 IN | OXYGEN SATURATION: 98 % | RESPIRATION RATE: 16 BRPM | WEIGHT: 162 LBS | HEART RATE: 64 BPM | BODY MASS INDEX: 31.8 KG/M2 | TEMPERATURE: 97.9 F | DIASTOLIC BLOOD PRESSURE: 68 MMHG | SYSTOLIC BLOOD PRESSURE: 123 MMHG

## 2021-12-15 NOTE — ED PROVIDER NOTES
This morning about 10:30 AM patient had some left chest pressure going to her left jaw and left arm. States her arm felt heavy. No numbness. Had some mild shortness of breath and nausea with it. No diaphoresis. Went to her PCPs office who evaluated her. Note follows. HPI  Mrs. Espinal is here because she has been sick for 2 weeks now, been on antibiotic, still has steroid, using her inhalers. She feels heaviness in her left side of her chest and has pain down her left arm. She is staying dizzy has ringing her ears, with shortness of breath. She has a productive cough at night and wheezing in day time. She went to Yoga this morning and when she got home she was so short of breath and her  felt she was having a panic attack so her gave her one of his nerve meds and it helped some. She then went to an med ER and with the long wait there decided to come here. Has waited a long time in our ER also. While here her chest pain has improved somewhat intermittent. No current chest pain. Also complains of some dizziness and shortness of breath. Has dealt with bronchitis for the past 2 weeks. Tested negative for Covid 7 days ago. The history is provided by the patient. No  was used. Chest Pain (Angina)   This is a new problem. The current episode started 6 to 12 hours ago. The problem has been gradually improving. The problem occurs constantly. The pain is associated with normal activity. The pain is present in the left side (sternal). The pain is moderate. The quality of the pain is described as pressure-like and heavy. The pain radiates to the left jaw and left arm. Associated symptoms include dizziness, malaise/fatigue, nausea, palpitations and shortness of breath.  Pertinent negatives include no abdominal pain, no back pain, no cough, no diaphoresis, no exertional chest pressure, no fever, no headaches, no irregular heartbeat, no leg pain, no lower extremity edema, no numbness, no vomiting and no weakness. She has tried nitroglycerin for the symptoms. The treatment provided mild relief. Her past medical history does not include DM or HTN. Pertinent negatives include no cardiac stents. Shortness of Breath  Associated symptoms include chest pain. Pertinent negatives include no fever, no headaches, no rhinorrhea, no sore throat, no neck pain, no cough, no wheezing, no vomiting, no abdominal pain, no rash, no leg pain and no leg swelling.         Past Medical History:   Diagnosis Date    Abdominal pain, epigastric     Abdominal pain, periumbilic     Absence of menstruation     Acute bronchitis 1/16/2015    Acute pancreatitis     Acute pharyngitis     Adverse effect of anesthesia     HTN after anesthesia (not hypo)     Anxiety     hx of anxiety/ situational  per pt- no longer on meds    Anxiety state, unspecified     Backache, unspecified 1/16/2015    Bronchitis, not specified as acute or chronic     Cervicalgia     Chest pain 11/17/2015    Chondromalacia of patella     Chronic maxillary sinusitis     Chronic UTI     managed with medication    Contusion of toe     Cough     Disturbance of skin sensation     Dyspepsia and other specified disorders of function of stomach     Esophageal reflux 1/16/2015    GERD (gastroesophageal reflux disease)     well controlled with meds    Hypersomnia due to medical condition 12/1/2014    Hypothyroidism 12/1/2014    Insomnia, unspecified     Irregular menstrual cycle 1/16/2015    Irritability     Lateral epicondylitis  of elbow     Lumbago     Migraine, unspecified, without mention of intractable migraine without mention of status migrainosus     Mixed hyperlipidemia 1/16/2015    Open wound of lip, without mention of complication     Open wound(s) (multiple) of unspecified site(s), without mention of complication     Other acute reactions to stress     Other and unspecified complications of medical care, not elsewhere classified     Other and unspecified hyperlipidemia     Other and unspecified mycoses     Other and unspecified noninfectious gastroenteritis and colitis(558.9) 2015    Other benign neoplasm of connective and other soft tissue of trunk, unspecified 2015    Other dysfunctions of sleep stages or arousal from sleep     Other joint derangement, not elsewhere classified, lower leg 2015    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)     Other specified symptom associated with female genital organs     Pain in joint, lower leg     Pain in joint, pelvic region and thigh     Pain in limb     Palpitations 2015    Persistent disorder of initiating or maintaining sleep 2014    Pneumonia, organism unspecified(486)     PONV (postoperative nausea and vomiting)     will usually have to stay in PACU a few hours due to N/V    Postmenopausal atrophic vaginitis     Prolonged depressive reaction     Pure hypercholesterolemia 2015    Rosacea 2015    Sacroiliitis, not elsewhere classified ([facility] [address]     Snores     states has not had sleep study for Dx of Sleep apnea    Spasm of muscle     Spondylosis of unspecified site without mention of myelopathy 2015    Sprain of ankle, unspecified site     Sprain of lumbar region     Symptomatic menopausal or female climacteric states     Synovial cyst of popliteal space 2015    Thoracic or lumbosacral neuritis or radiculitis, unspecified     Unspecified dermatitis due to sun 2015    Unspecified hypothyroidism 2015    Unspecified menopausal and postmenopausal disorder     Unspecified monoarthritis, ankle and foot     Unspecified pruritic disorder     Unspecified viral infection, in conditions classified elsewhere and of unspecified site     Urinary frequency        Past Surgical History:   Procedure Laterality Date    HX CERVICAL FUSION  2019    ANTERIOR CERVICAL DISCECTOMY [facility]    HX  SECTION      x1    HX COLONOSCOPY  2012    E[place] & [place]    HX HEART CATHETERIZATION  2013    no blockages per pt- \"false positive stress test\"     HX LAP CHOLECYSTECTOMY  2000    Dr Karlo Mi HX LUMBAR LAMINECTOMY      no fusion/hardware    HX TONSILLECTOMY           Family History:   Problem Relation Age of Onset    Diabetes Mother    Red Gasmen Bladder Disease Mother     Hypertension Mother     Stroke Maternal Grandmother        Social History     Socioeconomic History    Marital status:      Spouse name: Not on file    Number of children: Not on file    Years of education: Not on file    Highest education level: Not on file   Occupational History    Not on file   Tobacco Use    Smoking status: Former Smoker     Packs/day: 0.75     Years: 15.00     Pack years: 11.25     Quit date: 2010     Years since quittin.6    Smokeless tobacco: Never Used   Substance and Sexual Activity    Alcohol use: Yes     Alcohol/week: 5.0 standard drinks     Types: 3 Glasses of wine, 2 Standard drinks or equivalent per week    Drug use: No    Sexual activity: Not on file   Other Topics Concern    Not on file   Social History Narrative    Not on file     Social Determinants of Health     Financial Resource Strain:     Difficulty of Paying Living Expenses: Not on file   Food Insecurity:     Worried About Running Out of Food in the Last Year: Not on file    Sandra of Food in the Last Year: Not on file   Transportation Needs:     Lack of Transportation (Medical): Not on file    Lack of Transportation (Non-Medical):  Not on file   Physical Activity:     Days of Exercise per Week: Not on file    Minutes of Exercise per Session: Not on file   Stress:     Feeling of Stress : Not on file   Social Connections:     Frequency of Communication with Friends and Family: Not on file    Frequency of Social Gatherings with Friends and Family: Not on file    Attends Jew Services: Not on file   [facility] Active Member of Clubs or Organizations: Not on file    Attends Club or Organization Meetings: Not on file    Marital Status: Not on file   Intimate Partner Violence:     Fear of Current or Ex-Partner: Not on file    Emotionally Abused: Not on file    Physically Abused: Not on file    Sexually Abused: Not on file   Housing Stability:     Unable to Pay for Housing in the Last Year: Not on file    Number of [place] in the Last Year: Not on file    Unstable Housing in the Last Year: Not on file         ALLERGIES: Etodolac    Review of Systems   Constitutional: Positive for malaise/fatigue. Negative for chills, diaphoresis and fever. HENT: Negative for rhinorrhea and sore throat. Eyes: Negative for pain and redness. Respiratory: Positive for shortness of breath. Negative for cough, chest tightness and wheezing. Cardiovascular: Positive for chest pain and palpitations. Negative for leg swelling. Gastrointestinal: Positive for nausea. Negative for abdominal pain, diarrhea and vomiting. Genitourinary: Negative for dysuria and hematuria. Musculoskeletal: Negative for back pain, gait problem, neck pain and neck stiffness. Skin: Negative for color change and rash. Neurological: Positive for dizziness. Negative for weakness, numbness and headaches. Vitals:    12/14/21 1601 12/14/21 2102   BP: 131/74 126/72   Pulse: 77 64   Resp: 18 16   Temp: 97.3 °F (36.3 °C) 97.9 °F (36.6 °C)   SpO2: 97% 100%   Weight: 73.5 kg (162 lb)             Physical Exam  Constitutional:       Appearance: She is well-developed. HENT:      Head: Normocephalic and atraumatic. Cardiovascular:      Rate and Rhythm: Normal rate and regular rhythm. Pulmonary:      Effort: Pulmonary effort is normal.      Breath sounds: Normal breath sounds. Chest:      Chest wall: No tenderness. Abdominal:      General: Bowel sounds are normal.      Palpations: Abdomen is soft. Tenderness: There is no abdominal tenderness. Musculoskeletal:         General: No swelling. Normal range of motion. Cervical back: Normal range of motion and neck supple. Skin:     General: Skin is warm and dry. Neurological:      General: No focal deficit present. Mental Status: She is alert and oriented to person, place, and time. MDM  Number of Diagnoses or Management Options  Diagnosis management comments: Patient with no EKG changes and 2 - troponins. No acute on CT for PE. Has some pain in her left fingers but good radial flow and no color change. Will discharge with cardiology follow-up. Amount and/or Complexity of Data Reviewed  Clinical lab tests: ordered and reviewed  Tests in the radiology section of CPT®: ordered and reviewed  Tests in the medicine section of CPT®: ordered and reviewed    Patient Progress  Patient progress: stable         Procedures        EKG: normal sinus rhythm, nonspecific ST and T waves changes. Rate 72.            XR CHEST PA LAT (Final result)  Result time 12/14/21 16:14:16  Final result by Greg Hernandez MD (12/14/21 16:14:16)                Impression:    Normal chest.            Narrative:    EXAM: XR CHEST PA LAT     INDICATION: Chest Pain     COMPARISON: None. FINDINGS: PA and lateral radiographs of the chest demonstrate clear lungs.  The   cardiac and mediastinal contours and pulmonary vascularity are normal. The bones   and soft tissues are within normal limits.                          Results Include:    Recent Results (from the past 24 hour(s))   TROPONIN-HIGH SENSITIVITY    Collection Time: 12/14/21  4:05 PM   Result Value Ref Range    Troponin-High Sensitivity 4.3 0 - 14 pg/mL   CBC WITH AUTOMATED DIFF    Collection Time: 12/14/21  4:05 PM   Result Value Ref Range    WBC 8.5 4.3 - 11.1 K/uL    RBC 4.04 (L) 4.05 - 5.2 M/uL    HGB 12.9 11.7 - 15.4 g/dL    HCT 38.5 35.8 - 46.3 %    MCV 95.3 79.6 - 97.8 FL    MCH 31.9 26.1 - 32.9 PG    MCHC 33.5 31.4 - 35.0 g/dL    RDW 11.8 (L) 11.9 - 14.6 %    PLATELET 587 481 - 275 K/uL    MPV 11.6 9.4 - 12.3 FL    ABSOLUTE NRBC 0.00 0.0 - 0.2 K/uL    DF AUTOMATED      NEUTROPHILS 57 43 - 78 %    LYMPHOCYTES 31 13 - 44 %    MONOCYTES 8 4.0 - 12.0 %    EOSINOPHILS 2 0.5 - 7.8 %    BASOPHILS 1 0.0 - 2.0 %    IMMATURE GRANULOCYTES 2 0.0 - 5.0 %    ABS. NEUTROPHILS 4.9 1.7 - 8.2 K/UL    ABS. LYMPHOCYTES 2.7 0.5 - 4.6 K/UL    ABS. MONOCYTES 0.7 0.1 - 1.3 K/UL    ABS. EOSINOPHILS 0.1 0.0 - 0.8 K/UL    ABS. BASOPHILS 0.0 0.0 - 0.2 K/UL    ABS. IMM. GRANS. 0.1 0.0 - 0.5 K/UL   METABOLIC PANEL, COMPREHENSIVE    Collection Time: 12/14/21  4:05 PM   Result Value Ref Range    Sodium 144 136 - 145 mmol/L    Potassium 3.8 3.5 - 5.1 mmol/L    Chloride 111 (H) 98 - 107 mmol/L    CO2 29 21 - 32 mmol/L    Anion gap 4 (L) 7 - 16 mmol/L    Glucose 103 (H) 65 - 100 mg/dL    BUN 15 6 - 23 MG/DL    Creatinine 0.82 0.6 - 1.0 MG/DL    GFR est AA >60 >60 ml/min/1.73m2    GFR est non-AA >60 >60 ml/min/1.73m2    Calcium 8.4 8.3 - 10.4 MG/DL    Bilirubin, total 0.2 0.2 - 1.1 MG/DL    ALT (SGPT) 46 12 - 65 U/L    AST (SGOT) 22 15 - 37 U/L    Alk.  phosphatase 82 50 - 136 U/L    Protein, total 6.4 6.3 - 8.2 g/dL    Albumin 3.0 (L) 3.5 - 5.0 g/dL    Globulin 3.4 2.3 - 3.5 g/dL    A-G Ratio 0.9 (L) 1.2 - 3.5     LIPASE    Collection Time: 12/14/21  4:05 PM   Result Value Ref Range    Lipase 351 73 - 393 U/L   MAGNESIUM    Collection Time: 12/14/21  4:05 PM   Result Value Ref Range    Magnesium 2.1 1.8 - 2.4 mg/dL   EKG, 12 LEAD, INITIAL    Collection Time: 12/14/21  4:07 PM   Result Value Ref Range    Ventricular Rate 72 BPM    Atrial Rate 72 BPM    P-R Interval 104 ms    QRS Duration 78 ms    Q-T Interval 370 ms    QTC Calculation (Bezet) 405 ms    Calculated P Axis 42 degrees    Calculated R Axis 66 degrees    Calculated T Axis 6 degrees    Diagnosis       Sinus rhythm with short MO  Possible  Anterior infarct (cited on or before 19-SEP-2015)  Abnormal ECG  When compared with ECG of 23-JUN-2020 10:02,  Non-specific change in ST segment in Inferior leads  Confirmed by Risa Ford MD (), CARLOS ([id number]) on 12/14/2021 10:37:08 PM     TROPONIN-HIGH SENSITIVITY    Collection Time: 12/14/21  9:10 PM   Result Value Ref Range    Troponin-High Sensitivity 4.6 0 - 14 pg/mL   NT-PRO BNP    Collection Time: 12/14/21  9:10 PM   Result Value Ref Range    NT pro- 5 - 125 PG/ML   COVID-19 RAPID TEST    Collection Time: 12/14/21 10:51 PM   Result Value Ref Range    Specimen source Nasopharyngeal      COVID-19 rapid test Not detected NOTD           CT CHEST PULMONARY EMBOLISM (Final result)  Result time 12/15/21 01:38:02  Final result by Michell Boswell MD (12/15/21 01:38:02)                Impression:    1. No evidence of pulmonary embolism. 2. No acute intrathoracic process. Narrative:    EXAM: CT angiogram chest.     HISTORY: Chest pain and shortness of breath.       TECHNIQUE: CT angiographic images of the chest were obtained following   intravenous administration of contrast. Imaging was performed utilizing PE   protocol. Examination was performed in the axial plane and coronal   reconstructions were performed. 3-D MIPS reconstructions of the chest were   obtained. Dose reduction technique used: Automated exposure control/Adjustment of the mA   and/or kV according to patient size/Use of iterative reconstruction technique. COMPARISON: None. FINDINGS:   There is adequate opacification of the pulmonary arterial tree. No significant   filling defects are identified to suggest pulmonary embolism. Main pulmonary   artery is normal in caliber. The heart is not enlarged and there is no pericardial effusion. The great   vessels appear normal.     The visualized thyroid is unremarkable. There are no pathologically enlarged   mediastinal hilar or axillary lymph nodes. Trachea and mainstem bronchi are patent. There is no consolidation, pleural   effusion, or pneumothorax.  No nodules are seen. The visualized upper abdomen is unremarkable.  Osseous structures are within   normal limits.

## 2021-12-15 NOTE — ED NOTES
I have reviewed discharge instructions with the patient. The patient verbalized understanding. Patient left ED via Discharge Method: ambulatory to Home with . Opportunity for questions and clarification provided. Patient given 0 scripts. Pt in no acute distress at discharge. To continue your aftercare when you leave the hospital, you may receive an automated call from our care team to check in on how you are doing. This is a free service and part of our promise to provide the best care and service to meet your aftercare needs.  If you have questions, or wish to unsubscribe from this service please call [phone]. Thank you for Choosing our [facility] Emergency Department.

## 2022-03-18 PROBLEM — Z96.652 TOTAL KNEE REPLACEMENT STATUS, LEFT: Status: ACTIVE | Noted: 2020-07-17

## 2022-03-19 PROBLEM — M48.02 STENOSIS OF CERVICAL SPINE: Status: ACTIVE | Noted: 2019-03-12

## 2022-03-19 PROBLEM — M48.02 CERVICAL SPINAL STENOSIS: Status: ACTIVE | Noted: 2019-03-12

## 2022-03-20 PROBLEM — M17.12 ARTHRITIS OF KNEE, LEFT: Status: ACTIVE | Noted: 2020-07-16

## 2022-06-29 ENCOUNTER — OFFICE VISIT (OUTPATIENT)
Dept: ORTHOPEDIC SURGERY | Age: 62
End: 2022-06-29
Payer: OTHER GOVERNMENT

## 2022-06-29 ENCOUNTER — TELEPHONE (OUTPATIENT)
Dept: ORTHOPEDIC SURGERY | Age: 62
End: 2022-06-29

## 2022-06-29 DIAGNOSIS — M25.561 ACUTE PAIN OF RIGHT KNEE: ICD-10-CM

## 2022-06-29 DIAGNOSIS — M25.562 ACUTE PAIN OF LEFT KNEE: ICD-10-CM

## 2022-06-29 DIAGNOSIS — M25.561 ACUTE PAIN OF RIGHT KNEE: Primary | ICD-10-CM

## 2022-06-29 DIAGNOSIS — Z09 SURGERY FOLLOW-UP: ICD-10-CM

## 2022-06-29 DIAGNOSIS — Z96.652 TOTAL KNEE REPLACEMENT STATUS, LEFT: ICD-10-CM

## 2022-06-29 DIAGNOSIS — Z96.652 TOTAL KNEE REPLACEMENT STATUS, LEFT: Primary | ICD-10-CM

## 2022-06-29 PROCEDURE — 99214 OFFICE O/P EST MOD 30 MIN: CPT | Performed by: ORTHOPAEDIC SURGERY

## 2022-06-29 RX ORDER — DICLOFENAC SODIUM 75 MG/1
75 TABLET, DELAYED RELEASE ORAL 2 TIMES DAILY
Qty: 60 TABLET | Refills: 0 | Status: SHIPPED | OUTPATIENT
Start: 2022-06-29

## 2022-06-29 NOTE — PROGRESS NOTES
Name: Altagracia Johnson  YOB: 1960  Gender: female  MRN: [id number]    CC: Left knee pain    HPI: Sunday Steward is a 63 y.o. female who presents with left knee pain. She is now 2 years status post left total knee arthroplasty with a DePuy cementless implant with cemented resurfacing the patella. She generally did very well after her surgery and has really not been seen aside from her scheduled visits. Describes her total knee replacement is working well and allowing her to be very active but relatively constantly painful. She describes pain anteriorly and diffusely. She feels that it swells occasionally with activity. She does not have any weightbearing pain in particular. She does noted at night. She has had no fevers or change in her general health or malaise. She does complain of some pain in the right knee. She has had trouble with her before. X-rays have generally shown moderate degenerative joint disease and has not been as problematic as her left knee was prior to its surgery. Remains quite active in yoga and other weightbearing activities as well. History was obtained from patient    ROS/Meds/PSH/PMH/FH/SH: I personally reviewed the patients standard intake form. Below are the pertinents    Tobacco:  reports that she quit smoking about 12 years ago. She smoked 0.75 packs per day.  She has never used smokeless tobacco.  Past Medical History:   Diagnosis Date    Abdominal pain, epigastric     Abdominal pain, periumbilic     Absence of menstruation     Acute bronchitis 1/16/2015    Acute pancreatitis     Acute pharyngitis     Adverse effect of anesthesia     HTN after anesthesia (not hypo)     Anxiety     hx of anxiety/ situational  per pt- no longer on meds    Anxiety state, unspecified     Backache, unspecified 1/16/2015    Bronchitis, not specified as acute or chronic     Cervicalgia     Chest pain 11/17/2015    Chondromalacia of patella  Chronic maxillary sinusitis     Chronic UTI     managed with medication    Contusion of toe     Cough     Disturbance of skin sensation     Dyspepsia and other specified disorders of function of stomach     Esophageal reflux 1/16/2015    GERD (gastroesophageal reflux disease)     well controlled with meds    Hypersomnia due to medical condition 12/1/2014    Hypothyroidism 12/1/2014    Insomnia, unspecified     Irregular menstrual cycle 1/16/2015    Irritability     Lateral epicondylitis  of elbow     Lumbago     Migraine, unspecified, without mention of intractable migraine without mention of status migrainosus     Mixed hyperlipidemia 1/16/2015    Open wound of lip, without mention of complication     Open wound(s) (multiple) of unspecified site(s), without mention of complication     Other acute reactions to stress     Other and unspecified complications of medical care, not elsewhere classified     Other and unspecified hyperlipidemia     Other and unspecified mycoses     Other and unspecified noninfectious gastroenteritis and colitis(558.9) 1/16/2015    Other benign neoplasm of connective and other soft tissue of trunk, unspecified 1/16/2015    Other dysfunctions of sleep stages or arousal from sleep     Other joint derangement, not elsewhere classified, lower leg 1/16/2015    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)     Other specified symptom associated with female genital organs     Pain in joint, lower leg     Pain in joint, pelvic region and thigh     Pain in limb     Palpitations 11/17/2015    Persistent disorder of initiating or maintaining sleep 12/1/2014    Pneumonia, organism unspecified(486)     PONV (postoperative nausea and vomiting)     will usually have to stay in PACU a few hours due to N/V    Postmenopausal atrophic vaginitis     Prolonged depressive reaction     Pure hypercholesterolemia 1/16/2015    Rosacea 1/16/2015    Sacroiliitis, not elsewhere classified ([facility] [address]     Snores     states has not had sleep study for Dx of Sleep apnea    Spasm of muscle     Spondylosis of unspecified site without mention of myelopathy 1/16/2015    Sprain of ankle, unspecified site     Sprain of lumbar region     Symptomatic menopausal or female climacteric states     Synovial cyst of popliteal space 1/16/2015    Thoracic or lumbosacral neuritis or radiculitis, unspecified     Unspecified dermatitis due to sun 1/16/2015    Unspecified hypothyroidism 1/16/2015    Unspecified menopausal and postmenopausal disorder     Unspecified monoarthritis, ankle and foot     Unspecified pruritic disorder     Unspecified viral infection, in conditions classified elsewhere and of unspecified site     Urinary frequency       Past Surgical History:   Procedure Laterality Date    CARDIAC CATHETERIZATION  2013    no blockages per pt- \"false positive stress test\"     CERVICAL FUSION  03/12/2019    ANTERIOR CERVICAL DISCECTOMY Gregorio Jung- Dr Kymberly Rodriguez         Laverna Lips    Dr Matilde Garrido COLONOSCOPY  2012    EGD & [address]      no fusion/hardware    TONSILLECTOMY          Physical Examination:  Physical exam: On examination of the patient's gait there is no obvious limp    On examination while standing there is decreased flexion in the lumbosacral spine without acute list or spasm. While seated ,  the Bilateral hip is examined there is full range of motion without obvious issue . On examination of the  Left knee :ROM is 0 to 125 There is a well-healed midline incision with appropriate range of motion and balance. On examination of the  Right knee :ROM is 0 to 125 There is no obvious effusion. Patient is neurologically intact distally. Skin is intact. Imaging:   Radiographs:     An AP standing, skiers, flexion lateral, and sunrise view of the left knee was obtained  This demonstrated  Well-positioned total knee arthroplasty with stable fixation. Radiographic impression: Stable left total knee arthroplasty with good signs of bony ingrowth     Radiographs: An AP standing, skiers, flexion lateral, and sunrise view of the right knee knee was obtained  This demonstrated  Moderate joint space narrowing about the medial apartment. Radiographic impression: moderate DJD right Knee    Assessment:   Painful left total knee arthroplasty with stable appearing x-rays and a good range of motion and stability on clinical examination. I have explained to her that I am a bit of a loss to completely explain her discomfort. She does not have a lot of back pain or hip symptomatology. Given that I recommend going to get a sed rate CRP and CBC along with bone scan to rule out issues of loosening or an occult infection of the left knee. It may be that she is going to have some soft tissue discomfort going forward she will have to accommodate. There is consideration for perhaps nerve ablation or the cloolief procedure if her work-up otherwise is negative. In regards to her right knee she does have some moderate joint disease without significant radiographic progression and she was reassured by this. Will trial some Voltaren orally instead of her usual Celebrex to see if she gets any increased benefit from that but we will see what the other studies show. We will make further recommendations based on that on her return. .    Plan:       Follow up:   Return for after bone scan.      Chano Carrera MD

## 2022-07-06 ENCOUNTER — HOSPITAL ENCOUNTER (OUTPATIENT)
Dept: NUCLEAR MEDICINE | Age: 62
Discharge: HOME | End: 2022-07-09
Payer: OTHER GOVERNMENT

## 2022-07-06 ENCOUNTER — APPOINTMENT (OUTPATIENT)
Dept: NUCLEAR MEDICINE | Age: 62
End: 2022-07-06
Payer: OTHER GOVERNMENT

## 2022-07-06 DIAGNOSIS — M25.562 ACUTE PAIN OF LEFT KNEE: ICD-10-CM

## 2022-07-06 DIAGNOSIS — Z96.652 TOTAL KNEE REPLACEMENT STATUS, LEFT: ICD-10-CM

## 2022-07-06 LAB
BASOPHILS # BLD: 0.1 K/UL (ref 0–0.2)
BASOPHILS NFR BLD: 1 % (ref 0–2)
CRP SERPL-MCNC: 0.4 MG/DL (ref 0–0.9)
DIFFERENTIAL METHOD BLD: ABNORMAL
EOSINOPHIL # BLD: 0.1 K/UL (ref 0–0.8)
EOSINOPHIL NFR BLD: 3 % (ref 0.5–7.8)
ERYTHROCYTE [DISTWIDTH] IN BLOOD BY AUTOMATED COUNT: 12 % (ref 11.9–14.6)
HCT VFR BLD AUTO: 39.8 % (ref 35.8–46.3)
HGB BLD-MCNC: 13.4 G/DL (ref 11.7–15.4)
IMM GRANULOCYTES # BLD AUTO: 0 K/UL (ref 0–0.5)
IMM GRANULOCYTES NFR BLD AUTO: 1 % (ref 0–5)
LYMPHOCYTES # BLD: 1.6 K/UL (ref 0.5–4.6)
LYMPHOCYTES NFR BLD: 40 % (ref 13–44)
MCH RBC QN AUTO: 31.8 PG (ref 26.1–32.9)
MCHC RBC AUTO-ENTMCNC: 33.7 G/DL (ref 31.4–35)
MCV RBC AUTO: 94.3 FL (ref 79.6–97.8)
MONOCYTES # BLD: 0.3 K/UL (ref 0.1–1.3)
MONOCYTES NFR BLD: 8 % (ref 4–12)
NEUTS SEG # BLD: 2 K/UL (ref 1.7–8.2)
NEUTS SEG NFR BLD: 47 % (ref 43–78)
NRBC # BLD: 0 K/UL (ref 0–0.2)
PLATELET # BLD AUTO: 184 K/UL (ref 150–450)
PMV BLD AUTO: 12 FL (ref 9.4–12.3)
RBC # BLD AUTO: 4.22 M/UL (ref 4.05–5.2)
WBC # BLD AUTO: 4.1 K/UL (ref 4.3–11.1)

## 2022-07-06 PROCEDURE — A9503 TC99M MEDRONATE: HCPCS | Performed by: ORTHOPAEDIC SURGERY

## 2022-07-06 PROCEDURE — 3430000000 HC RX DIAGNOSTIC RADIOPHARMACEUTICAL: Performed by: ORTHOPAEDIC SURGERY

## 2022-07-06 PROCEDURE — 78306 BONE IMAGING WHOLE BODY: CPT | Performed by: ORTHOPAEDIC SURGERY

## 2022-07-06 RX ORDER — TC 99M MEDRONATE 20 MG/10ML
25 INJECTION, POWDER, LYOPHILIZED, FOR SOLUTION INTRAVENOUS
Status: COMPLETED | OUTPATIENT
Start: 2022-07-06 | End: 2022-07-06

## 2022-07-06 RX ADMIN — TC 99M MEDRONATE 25 MILLICURIE: 20 INJECTION, POWDER, LYOPHILIZED, FOR SOLUTION INTRAVENOUS at 08:50

## 2022-07-07 ENCOUNTER — TELEPHONE (OUTPATIENT)
Dept: ORTHOPEDIC SURGERY | Age: 62
End: 2022-07-07

## 2022-07-07 NOTE — TELEPHONE ENCOUNTER
She had a bone scan done and some labs drawn. Does she need an appt to come in to go over results? She is going out of town the first of August and would like to know before hand. Please let her know.

## 2022-07-08 ENCOUNTER — TELEPHONE (OUTPATIENT)
Dept: ORTHOPEDIC SURGERY | Age: 62
End: 2022-07-08

## 2022-07-13 ENCOUNTER — OFFICE VISIT (OUTPATIENT)
Dept: ORTHOPEDIC SURGERY | Age: 62
End: 2022-07-13
Payer: OTHER GOVERNMENT

## 2022-07-13 DIAGNOSIS — M25.511 ACUTE PAIN OF BOTH SHOULDERS: ICD-10-CM

## 2022-07-13 DIAGNOSIS — Z96.652 TOTAL KNEE REPLACEMENT STATUS, LEFT: ICD-10-CM

## 2022-07-13 DIAGNOSIS — M54.50 ACUTE LOW BACK PAIN, UNSPECIFIED BACK PAIN LATERALITY, UNSPECIFIED WHETHER SCIATICA PRESENT: Primary | ICD-10-CM

## 2022-07-13 DIAGNOSIS — M25.512 ACUTE PAIN OF BOTH SHOULDERS: ICD-10-CM

## 2022-07-13 DIAGNOSIS — M25.552 BILATERAL HIP PAIN: ICD-10-CM

## 2022-07-13 DIAGNOSIS — M25.551 BILATERAL HIP PAIN: ICD-10-CM

## 2022-07-13 PROCEDURE — 99214 OFFICE O/P EST MOD 30 MIN: CPT | Performed by: ORTHOPAEDIC SURGERY

## 2022-07-13 NOTE — PROGRESS NOTES
Name: Ramona Johnson  YOB: 1960  Gender: female  MRN: [id number]      Chief complaint:  LEFT KNEE PAIN    History of Present Illness:     Kaiden Victor is a 63 y.o. female  She returns today for follow regarding her left TKA in addition to work-up results. Today she does note that since she last saw Dr. Ruslan Lott her back is really been \"flared up. \"  She does note that her back in retrospect has been probably more of an issue. Again notes that her knee function has been great and that she is able to do everything she wants to do, even doing yoga/Pilates which she does routinely each week. She still notes chronic and severe anterior knee pain. Currently she is walking without any assistive device. Denies any fever, chills or malaise. Does note increased pain in both shoulders today as well. Past Medical History: Allergies: Allergies   Allergen Reactions    Etodolac Other (See Comments)     Mood Swings. Medications:  Current Outpatient Medications   Medication Sig    diclofenac (VOLTAREN) 75 MG EC tablet Take 1 tablet by mouth 2 times daily    celecoxib (CELEBREX) 200 MG capsule Take 200 mg by mouth daily    clonazePAM (KLONOPIN) 1 MG tablet Take 1 mg by mouth daily.  docusate (COLACE, DULCOLAX) 100 MG CAPS Take 100 mg by mouth as needed    fluticasone (FLONASE) 50 MCG/ACT nasal spray 2 sprays ea. Side daily    HYDROmorphone (DILAUDID) 2 MG tablet Take 2 mg by mouth every 4 hours as needed.     levothyroxine (SYNTHROID) 150 MCG tablet Take 150 mcg by mouth every morning (before breakfast)    levothyroxine (SYNTHROID) 175 MCG tablet Take 175 mcg by mouth every morning (before breakfast)    nitrofurantoin (MACRODANTIN) 100 MG capsule Take 100 mg by mouth every other day    pantoprazole (PROTONIX) 40 MG tablet Take 40 mg by mouth 2 times daily    promethazine (PHENERGAN) 25 MG tablet Take 25 mg by mouth every 6 hours as needed     No current facility-administered medications for this visit.        Past Medical history:  Past Medical History:   Diagnosis Date    Abdominal pain, epigastric     Abdominal pain, periumbilic     Absence of menstruation     Acute bronchitis 1/16/2015    Acute pancreatitis     Acute pharyngitis     Adverse effect of anesthesia     HTN after anesthesia (not hypo)     Anxiety     hx of anxiety/ situational  per pt- no longer on meds    Anxiety state, unspecified     Backache, unspecified 1/16/2015    Bronchitis, not specified as acute or chronic     Cervicalgia     Chest pain 11/17/2015    Chondromalacia of patella     Chronic maxillary sinusitis     Chronic UTI     managed with medication    Contusion of toe     Cough     Disturbance of skin sensation     Dyspepsia and other specified disorders of function of stomach     Esophageal reflux 1/16/2015    GERD (gastroesophageal reflux disease)     well controlled with meds    Hypersomnia due to medical condition 12/1/2014    Hypothyroidism 12/1/2014    Insomnia, unspecified     Irregular menstrual cycle 1/16/2015    Irritability     Lateral epicondylitis  of elbow     Lumbago     Migraine, unspecified, without mention of intractable migraine without mention of status migrainosus     Mixed hyperlipidemia 1/16/2015    Open wound of lip, without mention of complication     Open wound(s) (multiple) of unspecified site(s), without mention of complication     Other acute reactions to stress     Other and unspecified complications of medical care, not elsewhere classified     Other and unspecified hyperlipidemia     Other and unspecified mycoses     Other and unspecified noninfectious gastroenteritis and colitis(558.9) 1/16/2015    Other benign neoplasm of connective and other soft tissue of trunk, unspecified 1/16/2015    Other dysfunctions of sleep stages or arousal from sleep     Other joint derangement, not elsewhere classified, lower leg 2015    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)     Other specified symptom associated with female genital organs     Pain in joint, lower leg     Pain in joint, pelvic region and thigh     Pain in limb     Palpitations 2015    Persistent disorder of initiating or maintaining sleep 2014    Pneumonia, organism unspecified(486)     PONV (postoperative nausea and vomiting)     will usually have to stay in PACU a few hours due to N/V    Postmenopausal atrophic vaginitis     Prolonged depressive reaction     Pure hypercholesterolemia 2015    Rosacea 2015    Sacroiliitis, not elsewhere classified ([facility] [address]     Snores     states has not had sleep study for Dx of Sleep apnea    Spasm of muscle     Spondylosis of unspecified site without mention of myelopathy 2015    Sprain of ankle, unspecified site     Sprain of lumbar region     Symptomatic menopausal or female climacteric states     Synovial cyst of popliteal space 2015    Thoracic or lumbosacral neuritis or radiculitis, unspecified     Unspecified dermatitis due to sun 2015    Unspecified hypothyroidism 2015    Unspecified menopausal and postmenopausal disorder     Unspecified monoarthritis, ankle and foot     Unspecified pruritic disorder     Unspecified viral infection, in conditions classified elsewhere and of unspecified site     Urinary frequency         has a past surgical history that includes Colonoscopy (); cervical fusion (2019);  section; Tonsillectomy; Cardiac catheterization (); Cholecystectomy, laparoscopic (); and lumbar laminectomy. Family History:  [unfilled]     Social History:  [unfilled]    Review of Systems:       Physical Exam:    General:   Alert and oriented    Gait:          Normal gait    Back:        There is tenderness over lower back. LEFT Hip:    Skin is intact. There is pain with palpation over the greater trochanter.           No groin pain and restricted ROM of the hip     LEFT Knee: Skin: well healed surgical incision                    Effusion: no                    Tenderness to palpation: anteriorly                    Patella grind test: no                    Stability:  Valgus: yes Varus: yes AP: yes                    Stacey's test: negative                    Quad Strength: good                    ROM   Extension: 0  Flexion:120                    Popliteal cyst : no                    Calf pain : no                    Edema left leg: none noted    Neurovascular:  Patient has good pulses distally. They have intact motor and sensory function. Bone scan results:  CONCLUSION: Usual reactive changes adjacent to the left knee arthroplasty. Also   degenerative changes in the cervical spine, both shoulders and right foot. Diagnoses:  Painful left TKA in a patient with progressing low back pain, reassuring workup    Plan:  We discussed with her that her results with her bone scan as well as her blood work were reassuring for the absence of loosening and infection. It is still hard to identify exactly what is causing her chronic pain of the left knee, but it seems that more light is being she had on her back and this may be a big contributing factor now. We advised her on treatment options going forward. We will get her involved in physical therapy to work on her back, hips and knees. We will also have them address her shoulders while she is being evaluated as well. We discussed that her CRP was at least reassuring that it was within normal limits, and that PMR would be an unlikely diagnosis for her. We did discuss a bursa injection however after a previous cortisone injection of the left knee she had an allergic response to cortisone so we held off on pursuing an injection today. Would also be beneficial for her to have an evaluation of her back by one of our spine care providers in our group.   If over the next 4 to 6 months

## 2022-07-18 ENCOUNTER — OFFICE VISIT (OUTPATIENT)
Dept: ORTHOPEDIC SURGERY | Age: 62
End: 2022-07-18
Payer: OTHER GOVERNMENT

## 2022-07-18 VITALS — WEIGHT: 177 LBS | BODY MASS INDEX: 33.42 KG/M2 | HEIGHT: 61 IN

## 2022-07-18 DIAGNOSIS — M54.16 LUMBAR RADICULOPATHY: ICD-10-CM

## 2022-07-18 DIAGNOSIS — M54.9 BACK PAIN, UNSPECIFIED BACK LOCATION, UNSPECIFIED BACK PAIN LATERALITY, UNSPECIFIED CHRONICITY: Primary | ICD-10-CM

## 2022-07-18 DIAGNOSIS — M51.36 LUMBAR DEGENERATIVE DISC DISEASE: ICD-10-CM

## 2022-07-18 DIAGNOSIS — M47.816 LUMBAR FACET ARTHROPATHY: ICD-10-CM

## 2022-07-18 DIAGNOSIS — M43.16 SPONDYLOLISTHESIS OF LUMBAR REGION: ICD-10-CM

## 2022-07-18 PROCEDURE — 99204 OFFICE O/P NEW MOD 45 MIN: CPT | Performed by: NURSE PRACTITIONER

## 2022-07-18 RX ORDER — METHOCARBAMOL 750 MG/1
750 TABLET, FILM COATED ORAL 3 TIMES DAILY PRN
Qty: 30 TABLET | Refills: 0 | Status: SHIPPED | OUTPATIENT
Start: 2022-07-18 | End: 2022-07-28

## 2022-07-18 RX ORDER — METHYLPREDNISOLONE 4 MG/1
TABLET ORAL
Qty: 1 KIT | Refills: 0 | Status: SHIPPED | OUTPATIENT
Start: 2022-07-18

## 2022-07-18 NOTE — PROGRESS NOTES
Name: Tereso Johnson  YOB: 1960  Gender: female  MRN: [id number]    CC: New onset of back and leg pain    HPI: This is a 63y.o. year old female who has a history of ACDF with Dr. Kady Hansen. She is also had spine surgery probably 15 years ago she states. She is done well however in June rolled her ankle off of a sidewalk. She fell directly onto her buttocks and onto her back trying to protect the infant that she was holding. She then has developed low back pain. Its in the L5-S1 area. It is extremely painful and slowing her down. She is a very active individual and cannot really stand for any period of time. The only thing that relieves it is laying down. She is getting some posterior leg pain. She has had total knee arthroplasty by Dr. Johnson Quezada. The patient denies any change in bowel or bladder function since the onset of the symptoms. she  has had lumbar surgery in the past.      Thus far, the patient has tried NSAIDS and physician directed home exercise program    Current pain level: Activities limited by pain:        AMB PAIN ASSESSMENT 7/18/2022   Location of Pain Back   Location Modifiers Left;Right   Severity of Pain 8   Quality of Pain Sharp; Aching; Throbbing   Duration of Pain Persistent   Frequency of Pain Constant   Aggravating Factors Standing;Squatting; Other (Comment)   Limiting Behavior Yes   Relieving Factors Nsaids; Other (Comment)   Result of Injury Yes   Work-Related Injury No   Are there other pain locations you wish to document? No            ROS/Meds/PSH/PMH/FH/SH: I personally reviewed the patient's collected intake data. Below are the pertinents:    Allergies   Allergen Reactions    Etodolac Other (See Comments)     Mood Swings.           Current Outpatient Medications:     methocarbamol (ROBAXIN-750) 750 MG tablet, Take 1 tablet by mouth 3 times daily as needed (for muscle spasm), Disp: 30 tablet, Rfl: 0    methylPREDNISolone (MEDROL DOSEPACK) 4 MG tablet, Take by mouth as directed. Start in morning, Disp: 1 kit, Rfl: 0    diclofenac (VOLTAREN) 75 MG EC tablet, Take 1 tablet by mouth 2 times daily, Disp: 60 tablet, Rfl: 0    celecoxib (CELEBREX) 200 MG capsule, Take 200 mg by mouth daily, Disp: , Rfl:     clonazePAM (KLONOPIN) 1 MG tablet, Take 1 mg by mouth daily. , Disp: , Rfl:     docusate (COLACE, DULCOLAX) 100 MG CAPS, Take 100 mg by mouth as needed, Disp: , Rfl:     fluticasone (FLONASE) 50 MCG/ACT nasal spray, 2 sprays ea. Side daily, Disp: , Rfl:     HYDROmorphone (DILAUDID) 2 MG tablet, Take 2 mg by mouth every 4 hours as needed. , Disp: , Rfl:     levothyroxine (SYNTHROID) 150 MCG tablet, Take 150 mcg by mouth every morning (before breakfast), Disp: , Rfl:     levothyroxine (SYNTHROID) 175 MCG tablet, Take 175 mcg by mouth every morning (before breakfast), Disp: , Rfl:     nitrofurantoin (MACRODANTIN) 100 MG capsule, Take 100 mg by mouth every other day, Disp: , Rfl:     pantoprazole (PROTONIX) 40 MG tablet, Take 40 mg by mouth 2 times daily, Disp: , Rfl:     promethazine (PHENERGAN) 25 MG tablet, Take 25 mg by mouth every 6 hours as needed, Disp: , Rfl:     Past Surgical History:   Procedure Laterality Date    CARDIAC CATHETERIZATION  2013    no blockages per pt- \"false positive stress test\"     CERVICAL FUSION  03/12/2019    ANTERIOR CERVICAL DISCECTOMY Patricia Manzano- Dr Sara Greene      x1    [facility]rier    COLONOSCOPY  2012    EGD & [facility]      no fusion/hardware    TONSILLECTOMY         Patient Active Problem List   Diagnosis    Total knee replacement status, left    Pure hypercholesterolemia    Acute bronchitis    Esophageal reflux    Cervical spinal stenosis    Stenosis of cervical spine    Hypersomnia due to medical condition    Irregular menstrual cycle    Chest pain    Persistent disorder of initiating or maintaining sleep    Synovial cyst of popliteal space Osteoarthritis of spine    Arthritis of knee, left    Snoring    Palpitations    Mixed hyperlipidemia    Rosacea     Tobacco:  reports that she quit smoking about 12 years ago. She smoked an average of .75 packs per day. She has never used smokeless tobacco.  Alcohol:   Social History     Substance and Sexual Activity   Alcohol Use Yes    Alcohol/week: 5.0 standard drinks          Physical Exam:   BMI: Body mass index is 34 kg/m². GENERAL:  Adult in no acute distress, well developed, well nourished Patient is appropriately conversant  MSK:  Examination of the lumbar spine reveals paraspinal tenderness , facet tenderness, sacroiliac tenderness    There is moderate tenderness to palpation along the spinous processes and paraspinal musculature. The patient ambulates with a normal gait. ROM of bilateral hip(s) reveals no irritability. NEURO:  Cranial nerves grossly intact. No motor deficits. Straight leg testing is positive bilateral  Sensory testing reveals intact sensation to light touch and in the distribution of the L3-S1 dermatomes bilaterally  Ankle jerk is negative for clonus    Reflexes   Right Left   Quadriceps (L4) 2 2   Achilles (S1) 2 2     Strength testing in the lower extremity reveals the following based on the 5 point grading scale:     HF (L2) H Ab (L5) KE (L3/4) ADF (L4) EHL (L5) A Ev (S1) APF (S1)   Right 5 5 5 5 5 5 5   Left 5 5 5 5 5 5 5     PSYCH:  Alert and oriented X 3. Appropriate affect. Intact judgment and insight. Radiographic Studies:     AP, lateral and spot views of the lumbar spine:    Patient has a grade 1 almost grade 2 spondylolisthesis of L5 on S1. There is significant lower lumbar facet arthropathy. She does have some degenerative change in the hips this is mild to moderate in nature. Severe disc degeneration at L5-S1. Interpretation: Spondylolisthesis of L5 on S1 with associated disc degeneration and facet arthropathy.       Assessment/Plan: Diagnosis Orders   1. Back pain, unspecified back location, unspecified back pain laterality, unspecified chronicity  XR LUMBAR SPINE (2-3 VIEWS)    MRI LUMBAR SPINE WO CONTRAST      2. Spondylolisthesis of lumbar region  MRI LUMBAR SPINE WO CONTRAST      3. Lumbar degenerative disc disease  MRI LUMBAR SPINE WO CONTRAST      4. Lumbar facet arthropathy  MRI LUMBAR SPINE WO CONTRAST      5. Lumbar radiculopathy  MRI LUMBAR SPINE WO CONTRAST          This patient's clinical history and physical exam is consistent with a bilateral   L-5 and S-1 lumbar radiculopathy. And has a grade 1 almost grade 2 spondylolisthesis of L5 on S1. This is most likely the obvious source of her pain. She does have some mild SI joint symptoms especially after the fall. Groin pain could be associated with spondylolisthesis also there is some mild degenerative hip changes. At this point I will go ahead and give her a Medrol Dosepak and Robaxin. She has Voltaren that was given to her by Dr. Dee Cuellar. We will set her up for an MRI of the lumbar spine. I will see her back after. We discussed the natural history of lumbar radiculopathy in that many of these patients have near complete resolution of their symptoms within eight to twelve weeks with conservative care. We discussed that conservative treatments typically start with activity modification, and medication followed by physical therapy as symptoms allow. Oral and/or epidural steroids are other options. I also discussed potential surgical options if the symptoms fail to improve or there is a progressive neurologic deficit and conservative management has been exhausted. We discussed that surgery is not typically a reliable treatment for isolated back pain, but is usually very reliable in relieving buttock and leg symptoms.        - A home exercise program was prescribed for stretching and strengthening. A list of exercises was provided.    - Oral Steroids: A short course of oral steroids was prescribed in an attempt to bring the acute radicular symptoms under control. The patient understands the risks and side effects of oral steroids including immunosuppression, hypertension, mood swings, increased blood sugar, including glaucoma. The steroid taper can be followed by NSAIDs once completed. - Muscle Relaxant: The patient was prescribed a muscle relaxant. The patient understands that this is a temporary measure to bring acute spasm under control.    - A MRI was ordered to delineate anatomy, confirm the diagnosis and assess the severity. 4 This is a undiagnosed new problem with uncertain prognosis    Orders Placed This Encounter   Medications    methocarbamol (ROBAXIN-750) 750 MG tablet     Sig: Take 1 tablet by mouth 3 times daily as needed (for muscle spasm)     Dispense:  30 tablet     Refill:  0    methylPREDNISolone (MEDROL DOSEPACK) 4 MG tablet     Sig: Take by mouth as directed. Start in morning     Dispense:  1 kit     Refill:  0        Orders Placed This Encounter   Procedures    XR LUMBAR SPINE (2-3 VIEWS)    MRI LUMBAR SPINE WO CONTRAST            Return for MRI results. MARILIN Gamble - CNP  07/18/22      Elements of this note were created using speech recognition software. As such, errors of speech recognition may be present.

## 2022-07-22 ENCOUNTER — TELEPHONE (OUTPATIENT)
Dept: ORTHOPEDIC SURGERY | Age: 62
End: 2022-07-22

## 2022-07-22 NOTE — TELEPHONE ENCOUNTER
Patient says MRI order was sent to [facility] butit was suppose to go to [facility] you can send there, she did not have a fax#.

## 2022-08-25 ENCOUNTER — TELEPHONE (OUTPATIENT)
Dept: ORTHOPEDIC SURGERY | Age: 62
End: 2022-08-25

## 2022-08-25 DIAGNOSIS — Q76.2 CONGENITAL SPONDYLOLISTHESIS OF LUMBAR REGION: Primary | ICD-10-CM

## 2022-08-25 DIAGNOSIS — M47.816 LUMBAR FACET ARTHROPATHY: ICD-10-CM

## 2022-08-25 DIAGNOSIS — M54.16 LUMBAR RADICULOPATHY: ICD-10-CM

## 2022-08-25 DIAGNOSIS — M51.36 DDD (DEGENERATIVE DISC DISEASE), LUMBAR: ICD-10-CM

## 2022-08-25 NOTE — TELEPHONE ENCOUNTER
She is asking for a return call from [address] [place] [address]. She says she believes there has been a mixup in some of her orders.

## 2022-08-26 ENCOUNTER — CLINICAL DOCUMENTATION (OUTPATIENT)
Dept: ORTHOPEDIC SURGERY | Age: 62
End: 2022-08-26

## 2022-08-26 NOTE — PROGRESS NOTES
Spoke with [facility] today. They have contacted patient and lvm for her to call and schedule mri.

## 2022-09-12 ENCOUNTER — OFFICE VISIT (OUTPATIENT)
Dept: ORTHOPEDIC SURGERY | Age: 62
End: 2022-09-12
Payer: OTHER GOVERNMENT

## 2022-09-12 DIAGNOSIS — M71.38 SYNOVIAL CYST OF LUMBAR FACET JOINT: ICD-10-CM

## 2022-09-12 DIAGNOSIS — M48.061 STENOSIS OF LATERAL RECESS OF LUMBAR SPINE: ICD-10-CM

## 2022-09-12 DIAGNOSIS — M48.061 LUMBAR FORAMINAL STENOSIS: Primary | ICD-10-CM

## 2022-09-12 DIAGNOSIS — M54.16 LUMBAR RADICULOPATHY: ICD-10-CM

## 2022-09-12 DIAGNOSIS — M43.16 SPONDYLOLISTHESIS OF LUMBAR REGION: ICD-10-CM

## 2022-09-12 PROCEDURE — 99214 OFFICE O/P EST MOD 30 MIN: CPT | Performed by: NURSE PRACTITIONER

## 2022-09-12 RX ORDER — GABAPENTIN 100 MG/1
100-300 CAPSULE ORAL NIGHTLY
Qty: 90 CAPSULE | Refills: 1 | Status: SHIPPED | OUTPATIENT
Start: 2022-09-12 | End: 2022-11-11

## 2022-09-12 NOTE — PROGRESS NOTES
tablet by mouth 2 times daily, Disp: 60 tablet, Rfl: 0    celecoxib (CELEBREX) 200 MG capsule, Take 200 mg by mouth daily, Disp: , Rfl:     clonazePAM (KLONOPIN) 1 MG tablet, Take 1 mg by mouth daily. , Disp: , Rfl:     docusate (COLACE, DULCOLAX) 100 MG CAPS, Take 100 mg by mouth as needed, Disp: , Rfl:     fluticasone (FLONASE) 50 MCG/ACT nasal spray, 2 sprays ea. Side daily, Disp: , Rfl:     HYDROmorphone (DILAUDID) 2 MG tablet, Take 2 mg by mouth every 4 hours as needed. , Disp: , Rfl:     levothyroxine (SYNTHROID) 150 MCG tablet, Take 150 mcg by mouth every morning (before breakfast), Disp: , Rfl:     levothyroxine (SYNTHROID) 175 MCG tablet, Take 175 mcg by mouth every morning (before breakfast), Disp: , Rfl:     nitrofurantoin (MACRODANTIN) 100 MG capsule, Take 100 mg by mouth every other day, Disp: , Rfl:     pantoprazole (PROTONIX) 40 MG tablet, Take 40 mg by mouth 2 times daily, Disp: , Rfl:     promethazine (PHENERGAN) 25 MG tablet, Take 25 mg by mouth every 6 hours as needed, Disp: , Rfl:   Past Medical History:   Diagnosis Date    Abdominal pain, epigastric     Abdominal pain, periumbilic     Absence of menstruation     Acute bronchitis 1/16/2015    Acute pancreatitis     Acute pharyngitis     Adverse effect of anesthesia     HTN after anesthesia (not hypo)     Anxiety     hx of anxiety/ situational  per pt- no longer on meds    Anxiety state, unspecified     Backache, unspecified 1/16/2015    Bronchitis, not specified as acute or chronic     Cervicalgia     Chest pain 11/17/2015    Chondromalacia of patella     Chronic maxillary sinusitis     Chronic UTI     managed with medication    Contusion of toe     Cough     Disturbance of skin sensation     Dyspepsia and other specified disorders of function of stomach     Esophageal reflux 1/16/2015    GERD (gastroesophageal reflux disease)     well controlled with meds    Hypersomnia due to medical condition 12/1/2014    Hypothyroidism 12/1/2014    Insomnia, unspecified     Irregular menstrual cycle 1/16/2015    Irritability     Lateral epicondylitis  of elbow     Lumbago     Migraine, unspecified, without mention of intractable migraine without mention of status migrainosus     Mixed hyperlipidemia 1/16/2015    Open wound of lip, without mention of complication     Open wound(s) (multiple) of unspecified site(s), without mention of complication     Other acute reactions to stress     Other and unspecified complications of medical care, not elsewhere classified     Other and unspecified hyperlipidemia     Other and unspecified mycoses     Other and unspecified noninfectious gastroenteritis and colitis(558.9) 1/16/2015    Other benign neoplasm of connective and other soft tissue of trunk, unspecified 1/16/2015    Other dysfunctions of sleep stages or arousal from sleep     Other joint derangement, not elsewhere classified, lower leg 1/16/2015    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)     Other specified symptom associated with female genital organs     Pain in joint, lower leg     Pain in joint, pelvic region and thigh     Pain in limb     Palpitations 11/17/2015    Persistent disorder of initiating or maintaining sleep 12/1/2014    Pneumonia, organism unspecified(486)     PONV (postoperative nausea and vomiting)     will usually have to stay in PACU a few hours due to N/V    Postmenopausal atrophic vaginitis     Prolonged depressive reaction     Pure hypercholesterolemia 1/16/2015    Rosacea 1/16/2015    Sacroiliitis, not elsewhere classified ([facility] [address]     Snores     states has not had sleep study for Dx of Sleep apnea    Spasm of muscle     Spondylosis of unspecified site without mention of myelopathy 1/16/2015    Sprain of ankle, unspecified site     Sprain of lumbar region     Symptomatic menopausal or female climacteric states     Synovial cyst of popliteal space 1/16/2015    Thoracic or lumbosacral neuritis or radiculitis, unspecified     Unspecified dermatitis due to sun 1/16/2015    Unspecified hypothyroidism 1/16/2015    Unspecified menopausal and postmenopausal disorder     Unspecified monoarthritis, ankle and foot     Unspecified pruritic disorder     Unspecified viral infection, in conditions classified elsewhere and of unspecified site     Urinary frequency      Tobacco:  reports that she quit smoking about 12 years ago. She smoked an average of .75 packs per day. She has never used smokeless tobacco.  Alcohol:   Social History     Substance and Sexual Activity   Alcohol Use Yes    Alcohol/week: 5.0 standard drinks          Radiographic Studies:       MRI Results (most recent):  MRI of the lumbar spine images independently reviewed from [facility] radiology: Patient has a grade 1 spondylolisthesis of L5 and S1. There is severe bilateral foraminal stenosis. There is excessive severe facet arthropathy at this level. Looks as though there actually may be an facet cyst causing some lateral recess narrowing on the left at L5. There is bilateral facet arthropathy at L4-5 with moderate foraminal narrowing. At L3-4 there is a left extradural facet cyst causing significant lateral recess stenosis. Assessment/Plan:        ICD-10-CM    1. Lumbar foraminal stenosis  M48.061       2. Stenosis of lateral recess of lumbar spine  M48.061       3. Lumbar radiculopathy  M54.16       4. Spondylolisthesis of lumbar region  M43.16       5. Synovial cyst of lumbar facet joint  M71.38            I reviewed patient's MRI images and findings with her in detail. I think there is an incidental finding of left L3-4 facet cyst that unfortunately is causing some impingement of the left L4 nerve root. However I think her greatest source of pain is coming from her spondylolisthesis of L5 on S1. We discussed that she has severe foraminal stenosis and severe impingement of the L5 nerves bilaterally at this level. This is what is causing her back and leg pain.   We discussed options to include continuing conservative treatment, interventional management which would be steroid injections. Patient has had previous steroid injections with Dr. Mak Lin. However she does have concerns with her previous reaction to an intra-articular left knee injection, this was a Depo-Medrol shot with Dr. Thierry Shell. .  At that time she denied any systemic response hives or shortness of breath. She just had swelling of the joint. She really does not want to consider surgery at this time. We did discuss that this would be an anterior posterior approach and would require a fusion. If we were going to address the L5-S1 level we might as well go ahead and do at least a left L3-4 hemilaminectomy. We also discussed and medication management. She is going to continue the Celebrex twice a day. We discussed the risks of anti-inflammatories. She is tolerating this without any issues and no history of stroke or cardiovascular disease. I will also add gabapentin. We discussed that this is a seizure medicine used to treat nerve pain. I will start her out with 100 to 300 mg at night that she may increase as tolerated. I also told her that she can take this during the day if she can do this without any significant side effect. She will follow-up with me in 4 weeks and we will see where she is as far as her pain status.    - Neuropathic pain treatment: For neuropathic pain relief the patient was given a prescription and counseled regarding the possibility of risks and complications from this medication. Orders Placed This Encounter   Medications    gabapentin (NEURONTIN) 100 MG capsule     Sig: Take 1-3 capsules by mouth nightly for 60 days. Dispense:  90 capsule     Refill:  1        No orders of the defined types were placed in this encounter. 4 This is a chronic illness/condition with exacerbation and progression      Return in about 4 weeks (around 10/10/2022).      MARILIN Sotelo - CNP  09/12/22      Elements of this

## 2022-10-27 ENCOUNTER — TELEPHONE (OUTPATIENT)
Dept: ORTHOPEDIC SURGERY | Age: 62
End: 2022-10-27

## 2022-10-27 NOTE — TELEPHONE ENCOUNTER
She was prescribed Celebrex twice per day. She was already taking it once per day but the prescription was never changed. [facility]

## 2022-10-28 RX ORDER — CELECOXIB 200 MG/1
200 CAPSULE ORAL 2 TIMES DAILY PRN
Qty: 60 CAPSULE | Refills: 0 | Status: SHIPPED | OUTPATIENT
Start: 2022-10-28

## 2022-10-28 NOTE — TELEPHONE ENCOUNTER
Lvm for patient that Jose Landa is okay for 30 days but she [facility] not [facility].  She will have to speak with PCP due to the fact that we are not monitoring her kidneys to prescribe nsaids long term/

## 2024-01-17 ENCOUNTER — TELEPHONE (OUTPATIENT)
Dept: ORTHOPEDIC SURGERY | Age: 64
End: 2024-01-17

## 2024-01-17 NOTE — TELEPHONE ENCOUNTER
Please call patient, her pcp said she needed to see him and have him check something from her surgery he did??  Xrays no MRI done recent

## 2024-01-30 ENCOUNTER — OFFICE VISIT (OUTPATIENT)
Dept: ORTHOPEDIC SURGERY | Age: 64
End: 2024-01-30
Payer: OTHER GOVERNMENT

## 2024-01-30 VITALS — HEIGHT: 60 IN | BODY MASS INDEX: 35.93 KG/M2 | WEIGHT: 183 LBS

## 2024-01-30 DIAGNOSIS — M50.30 DEGENERATIVE DISC DISEASE, CERVICAL: ICD-10-CM

## 2024-01-30 DIAGNOSIS — M47.812 ARTHROPATHY OF CERVICAL FACET JOINT: Primary | ICD-10-CM

## 2024-01-30 DIAGNOSIS — M54.12 CERVICAL RADICULOPATHY: ICD-10-CM

## 2024-01-30 DIAGNOSIS — Z98.1 STATUS POST CERVICAL SPINAL FUSION: ICD-10-CM

## 2024-01-30 PROCEDURE — 99214 OFFICE O/P EST MOD 30 MIN: CPT | Performed by: NURSE PRACTITIONER

## 2024-01-30 RX ORDER — ESOMEPRAZOLE MAGNESIUM 40 MG/1
40 CAPSULE, DELAYED RELEASE ORAL
COMMUNITY
Start: 2024-01-25 | End: 2025-01-24

## 2024-01-30 RX ORDER — METHOCARBAMOL 750 MG/1
750 TABLET, FILM COATED ORAL 3 TIMES DAILY PRN
Qty: 30 TABLET | Refills: 0 | Status: SHIPPED | OUTPATIENT
Start: 2024-01-30 | End: 2024-02-09

## 2024-01-30 RX ORDER — FAMOTIDINE 20 MG/1
20 TABLET, FILM COATED ORAL NIGHTLY
COMMUNITY
Start: 2024-01-25 | End: 2025-01-24

## 2024-01-30 RX ORDER — MEDROXYPROGESTERONE ACETATE 2.5 MG/1
2.5 TABLET ORAL DAILY
COMMUNITY
Start: 2024-01-25

## 2024-01-30 NOTE — PROGRESS NOTES
Name: Tameka Johnson  YOB: 1960  Gender: female  MRN: [id number]    CC: Increased neck pain, right arm pain/numbness    History of present illness:    This is a very pleasant 63 y.o. old female who presents with  6-month complaint of increasing neck pain with numbness radiating down her arm.  Primarily on the right only to about the mid forearm.  I it does resolve if she changes position.  She has great difficulty sleeping at night due to the stiffness in her neck.  She has a history in 2019 ACDF surgery with Dr. Tyson.  She does not tolerate most NSAIDs but is able to take Celebrex which she takes once a day.  She has tried Robaxin in the past.  I have treated patient in the past for her lumbar spondylolisthesis of L5 on S1.    There have not been changes in fine motor skills such as handwriting and buttoning buttons. There have not been changes in gait since the onset of the symptoms.   The patient has had cervical surgery in the past.    Thus far, efforts to address the pain have included patient is continue to home exercise program under my care since September 2022.  She does do these exercises at least 5 times a week, NSAIDs, muscle relaxers, she has had 2 rounds of steroids in December which helped while she is taking them but her pain immediately comes back.  She has had gabapentin in the past.             1/30/2024     9:34 AM   AMB PAIN ASSESSMENT   Location of Pain Neck   Location Modifiers Right;Left   Frequency of Pain Intermittent   Limiting Behavior Yes   Result of Injury No   Work-Related Injury No   Are there other pain locations you wish to document? No          ROS/Meds/PSH/PMH/FH/SH: I personally reviewed the patient's collected intake data.  Below are the pertinents:    Allergies   Allergen Reactions    Etodolac Other (See Comments)     Mood Swings.          Current Outpatient Medications:     esomeprazole (NEXIUM) 40 MG delayed release capsule, Take 1 capsule by

## 2024-02-05 DIAGNOSIS — Z98.1 STATUS POST CERVICAL SPINAL FUSION: ICD-10-CM

## 2024-02-05 DIAGNOSIS — M54.12 CERVICAL RADICULOPATHY: ICD-10-CM

## 2024-02-05 DIAGNOSIS — M50.30 DEGENERATIVE DISC DISEASE, CERVICAL: ICD-10-CM

## 2024-02-05 DIAGNOSIS — M47.812 ARTHROPATHY OF CERVICAL FACET JOINT: ICD-10-CM

## 2024-02-06 ENCOUNTER — OFFICE VISIT (OUTPATIENT)
Dept: ORTHOPEDIC SURGERY | Age: 64
End: 2024-02-06
Payer: OTHER GOVERNMENT

## 2024-02-06 DIAGNOSIS — M50.30 DEGENERATIVE DISC DISEASE, CERVICAL: ICD-10-CM

## 2024-02-06 DIAGNOSIS — M48.02 FORAMINAL STENOSIS OF CERVICAL REGION: ICD-10-CM

## 2024-02-06 DIAGNOSIS — M47.812 ARTHROPATHY OF CERVICAL FACET JOINT: Primary | ICD-10-CM

## 2024-02-06 PROCEDURE — 99214 OFFICE O/P EST MOD 30 MIN: CPT | Performed by: NURSE PRACTITIONER

## 2024-02-06 RX ORDER — TIZANIDINE 2 MG/1
2 TABLET ORAL 3 TIMES DAILY PRN
Qty: 30 TABLET | Refills: 0 | Status: SHIPPED | OUTPATIENT
Start: 2024-02-06

## 2024-02-06 RX ORDER — TRAMADOL HYDROCHLORIDE 50 MG/1
50 TABLET ORAL EVERY 8 HOURS PRN
Qty: 15 TABLET | Refills: 0 | Status: SHIPPED | OUTPATIENT
Start: 2024-02-06 | End: 2024-02-11

## 2024-02-06 NOTE — PROGRESS NOTES
Name: Tameka Johnson  YOB: 1960  Gender: female  MRN: [id number]    CC: Follow-up neck pain, right arm pain/numbness    HPI: This is a 63 y.o. year old female who has a history of ACDF with Dr. Tyson in 2019.  She has had 6 months complaint of increasing complaints of neck pain.  She states she feels severe pain in her neck especially with waking almost as if though her pain is going to \"lock up\".  She is tearful today discussing her pain.  She has been doing home exercises under my care since September 2022.  She does these exercises 5 times a week.  She is on Celebrex.  She is tried muscle relaxers, 2 rounds of steroids.  They help momentarily her pain comes back.  She has tried gabapentin as well.  At last visit I tried Robaxin and she did not find this very helpful.  X-rays revealed stable ACDF from C5-C7 with some degree of adjacent level disease but extensive cervical facet arthropathy.  At last visit patient was referred for an MRI of the cervical spine   Thus far, the patient has tried tylenol, NSAIDS, muscle relaxers, oral steroids, physician directed home exercise program, and surgery.           2/6/2024     8:54 AM   AMB PAIN ASSESSMENT   Location of Pain Neck   Location Modifiers Right;Left   Severity of Pain 5   Frequency of Pain Intermittent   Limiting Behavior Yes   Result of Injury No   Work-Related Injury No   Are there other pain locations you wish to document? No        Allergies   Allergen Reactions    Etodolac Other (See Comments)     Mood Swings.        Current Outpatient Medications:     traMADol (ULTRAM) 50 MG tablet, Take 1 tablet by mouth every 8 hours as needed for Pain for up to 5 days. Take lowest dose possible to manage pain Max Daily Amount: 150 mg, Disp: 15 tablet, Rfl: 0    tiZANidine (ZANAFLEX) 2 MG tablet, Take 1 tablet by mouth 3 times daily as needed (muscle spasm), Disp: 30 tablet, Rfl: 0    esomeprazole (NEXIUM) 40 MG delayed release capsule, Take

## 2024-02-09 ENCOUNTER — TELEPHONE (OUTPATIENT)
Dept: ORTHOPEDIC SURGERY | Age: 64
End: 2024-02-09

## 2024-02-12 ENCOUNTER — TELEPHONE (OUTPATIENT)
Dept: ORTHOPEDIC SURGERY | Age: 64
End: 2024-02-12

## 2024-02-13 ENCOUNTER — TELEPHONE (OUTPATIENT)
Dept: ORTHOPEDIC SURGERY | Age: 64
End: 2024-02-13

## 2024-02-13 RX ORDER — METHYLPREDNISOLONE 4 MG/1
TABLET ORAL
Qty: 1 KIT | Refills: 0 | Status: SHIPPED | OUTPATIENT
Start: 2024-02-13

## 2024-02-13 NOTE — TELEPHONE ENCOUNTER
She states the past few days her arm and neck has been \"seizing up.\" She is wondering if maybe a steroid pack could be sent in to get her by until she sees Dr. Das.

## 2024-02-14 ENCOUNTER — OFFICE VISIT (OUTPATIENT)
Dept: ENDOCRINOLOGY | Age: 64
End: 2024-02-14
Payer: OTHER GOVERNMENT

## 2024-02-14 VITALS
SYSTOLIC BLOOD PRESSURE: 114 MMHG | OXYGEN SATURATION: 97 % | BODY MASS INDEX: 34.16 KG/M2 | DIASTOLIC BLOOD PRESSURE: 78 MMHG | HEART RATE: 76 BPM | RESPIRATION RATE: 16 BRPM | WEIGHT: 174 LBS | HEIGHT: 60 IN

## 2024-02-14 DIAGNOSIS — E55.9 VITAMIN D DEFICIENCY: ICD-10-CM

## 2024-02-14 DIAGNOSIS — E03.9 PRIMARY HYPOTHYROIDISM: Primary | ICD-10-CM

## 2024-02-14 PROCEDURE — 99204 OFFICE O/P NEW MOD 45 MIN: CPT | Performed by: INTERNAL MEDICINE

## 2024-02-14 RX ORDER — ESTERIFIED ESTROGEN AND METHYLTESTOSTERONE .625; 1.25 MG/1; MG/1
1 TABLET ORAL DAILY
COMMUNITY

## 2024-02-14 RX ORDER — LEVOTHYROXINE SODIUM 137 UG/1
137 TABLET ORAL EVERY OTHER DAY
Qty: 45 TABLET | Refills: 3
Start: 2024-02-14

## 2024-02-14 RX ORDER — MULTIVIT-MIN/IRON/FOLIC ACID/K 18-600-40
CAPSULE ORAL
COMMUNITY
End: 2024-02-14 | Stop reason: SDUPTHER

## 2024-02-14 RX ORDER — LEVOTHYROXINE SODIUM 0.12 MG/1
125 TABLET ORAL EVERY OTHER DAY
Qty: 45 TABLET | Refills: 3
Start: 2024-02-14

## 2024-02-14 RX ORDER — METHOCARBAMOL 750 MG/1
750 TABLET, FILM COATED ORAL 3 TIMES DAILY
COMMUNITY

## 2024-02-14 RX ORDER — MULTIVIT-MIN/IRON/FOLIC ACID/K 18-600-40
2000 CAPSULE ORAL DAILY
Qty: 90 CAPSULE | Refills: 3
Start: 2024-02-14

## 2024-02-14 NOTE — PROGRESS NOTES
TAZ Britt MD, FACE    [facility] ENDOCRINOLOGY   AND   THYROID NODULE CLINIC            Reason for visit: Tameka Johnson is referred by Chai Kim DO ([facility]) for the evaluation and management of \"acquired hypothyroidism\".        ASSESSMENT AND PLAN:    1. Primary hypothyroidism  Ms. Johnson has hypothyroidism with recent difficulty maintaining a biochemically euthyroid state.  Unfortunately, the only recent labs to which I have access are those from 1/18/2024.  I will have her wait one more week and check TSH/free T4/TPO antibodies.  She is concerned that that Dr. Kim is not checking enough thyroid labs; on the contrary, TSH is the only test necessary to assess the adequacy of thyroid hormone replacement.  I agree with Dr. Kim that she has underlying anxiety and depression.  She seems to disagree with that but would probably benefit for treatment thereafter if she is willing in the future.  Defer to Dr. Kim.  - levothyroxine (SYNTHROID) 125 MCG tablet; Take 1 tablet by mouth every other day (alternating with 137 mcg every other day)  Dispense: 45 tablet; Refill: 3  - levothyroxine (SYNTHROID) 137 MCG tablet; Take 1 tablet by mouth every other day (alternating with 125 mcg every other day)  Dispense: 45 tablet; Refill: 3  - TSH; Future  - T4, Free; Future  - Thyroid Peroxidase Antibody; Future  - TSH; Future  - T4, Free; Future    2. Vitamin D deficiency  I will monitor vitamin D levels.  - Cholecalciferol (VITAMIN D) 50 MCG (2000 UT) CAPS capsule; Take 2,000 Units by mouth daily  Dispense: 90 capsule; Refill: 3  - Vitamin D 25 Hydroxy; Future  - Vitamin D 25 Hydroxy; Future      Follow-up and Dispositions    Return in about 4 months (around 6/14/2024).         History of Present Illness:    THYROID DYSFUNCTION  Tameka Johnson is seen for new evaluation/management of primary hypothyroidism; this was diagnosed at age 30 or 31.  She lives in [place], [place]

## 2024-02-19 ENCOUNTER — OFFICE VISIT (OUTPATIENT)
Dept: ORTHOPEDIC SURGERY | Age: 64
End: 2024-02-19
Payer: OTHER GOVERNMENT

## 2024-02-19 DIAGNOSIS — M54.12 CERVICAL RADICULOPATHY: Primary | ICD-10-CM

## 2024-02-19 DIAGNOSIS — M48.02 FORAMINAL STENOSIS OF CERVICAL REGION: ICD-10-CM

## 2024-02-19 DIAGNOSIS — M40.292 OTHER KYPHOSIS, CERVICAL REGION: ICD-10-CM

## 2024-02-19 PROCEDURE — 99214 OFFICE O/P EST MOD 30 MIN: CPT | Performed by: ORTHOPAEDIC SURGERY

## 2024-02-19 RX ORDER — METHYLPREDNISOLONE 4 MG/1
4 TABLET ORAL SEE ADMIN INSTRUCTIONS
Qty: 1 KIT | Refills: 0 | Status: SHIPPED | OUTPATIENT
Start: 2024-02-19

## 2024-02-19 NOTE — PROGRESS NOTES
Name: Tameka Johnson  YOB: 1960  Gender: female  MRN: [id number]  Age: 63 y.o.    Chief Complaint: Neck and radiating upper extremity pain.    History of present illness:    This is a very pleasant 63 y.o. female who is well-known to me from her ACDF surgery performed about 5 years ago.  The patient had done pretty well until about 6 months ago when she began having neck pain with radiation to the right shoulder.  For the most part, it terminates around the deltoid and anterior biceps area.  However, on occasion it does permeate further into the forearm and wrist.  The symptoms are always precipitated by neck range of motion.  She has some relief by placing her arm on her head.  She has tried at home exercise program as documented by Suhas Alas.  She has been on regimens of Celebrex, muscle relaxants, and multiple rounds of oral steroids.  The oral steroids always seem to alleviate her symptoms while she is taking them but the symptoms always come back.  She has been on a regimen of gabapentin.  At this point, she is at her wits end.  Her pain has intensified to an intolerable level and she is ready to pursue surgical options.            Medications:   Current Outpatient Medications   Medication Sig    estrogens, conjugated,-methylTESTOSTERone (ESTRATEST HS) 0.625-1.25 MG per tablet Take 1 tablet by mouth daily. Max Daily Amount: 1 tablet    methocarbamol (ROBAXIN) 750 MG tablet Take 1 tablet by mouth 3 times daily    Cholecalciferol (VITAMIN D) 50 MCG (2000 UT) CAPS capsule Take 2,000 Units by mouth daily    levothyroxine (SYNTHROID) 125 MCG tablet Take 1 tablet by mouth every other day (alternating with 137 mcg every other day)    levothyroxine (SYNTHROID) 137 MCG tablet Take 1 tablet by mouth every other day (alternating with 125 mcg every other day)    methylPREDNISolone (MEDROL DOSEPACK) 4 MG tablet Take by mouth as directed. Start in morning    esomeprazole (NEXIUM) 40 MG

## 2024-02-20 ENCOUNTER — PREP FOR PROCEDURE (OUTPATIENT)
Dept: ORTHOPEDIC SURGERY | Age: 64
End: 2024-02-20

## 2024-02-20 DIAGNOSIS — M50.20 CERVICAL DISC HERNIATION: ICD-10-CM

## 2024-02-20 DIAGNOSIS — M40.292 OTHER KYPHOSIS, CERVICAL REGION: ICD-10-CM

## 2024-02-20 DIAGNOSIS — M48.02 CERVICAL SPINAL STENOSIS: ICD-10-CM

## 2024-02-20 DIAGNOSIS — M54.12 CERVICAL RADICULOPATHY: Primary | ICD-10-CM

## 2024-02-20 PROBLEM — M40.209 KYPHOSIS: Status: ACTIVE | Noted: 2024-02-20

## 2024-02-21 DIAGNOSIS — E03.9 PRIMARY HYPOTHYROIDISM: ICD-10-CM

## 2024-02-21 DIAGNOSIS — E55.9 VITAMIN D DEFICIENCY: ICD-10-CM

## 2024-02-21 RX ORDER — ACETAMINOPHEN 325 MG/1
1000 TABLET ORAL ONCE
Status: CANCELLED | OUTPATIENT
Start: 2024-02-21 | End: 2024-02-21

## 2024-02-23 ENCOUNTER — HOSPITAL ENCOUNTER (OUTPATIENT)
Dept: SURGERY | Age: 64
Discharge: HOME | End: 2024-02-23
Payer: OTHER GOVERNMENT

## 2024-02-23 VITALS
RESPIRATION RATE: 16 BRPM | DIASTOLIC BLOOD PRESSURE: 68 MMHG | WEIGHT: 186.1 LBS | BODY MASS INDEX: 36.53 KG/M2 | TEMPERATURE: 98.4 F | SYSTOLIC BLOOD PRESSURE: 142 MMHG | HEIGHT: 60 IN | OXYGEN SATURATION: 97 % | HEART RATE: 65 BPM

## 2024-02-23 LAB
ANION GAP SERPL CALC-SCNC: 4 MMOL/L (ref 2–11)
APPEARANCE UR: CLEAR
BASOPHILS # BLD: 0.1 K/UL (ref 0–0.2)
BASOPHILS NFR BLD: 1 % (ref 0–2)
BILIRUB UR QL: NEGATIVE
BUN SERPL-MCNC: 12 MG/DL (ref 8–23)
CALCIUM SERPL-MCNC: 8.6 MG/DL (ref 8.3–10.4)
CHLORIDE SERPL-SCNC: 108 MMOL/L (ref 103–113)
CO2 SERPL-SCNC: 25 MMOL/L (ref 21–32)
COLOR UR: NORMAL
CREAT SERPL-MCNC: 1.12 MG/DL (ref 0.6–1)
DIFFERENTIAL METHOD BLD: ABNORMAL
EOSINOPHIL # BLD: 0.2 K/UL (ref 0–0.8)
EOSINOPHIL NFR BLD: 3 % (ref 0.5–7.8)
ERYTHROCYTE [DISTWIDTH] IN BLOOD BY AUTOMATED COUNT: 11.7 % (ref 11.9–14.6)
GLUCOSE SERPL-MCNC: 102 MG/DL (ref 65–100)
GLUCOSE UR STRIP.AUTO-MCNC: NEGATIVE MG/DL
HCT VFR BLD AUTO: 42 % (ref 35.8–46.3)
HGB BLD-MCNC: 14.3 G/DL (ref 11.7–15.4)
HGB UR QL STRIP: NEGATIVE
IMM GRANULOCYTES # BLD AUTO: 0.1 K/UL (ref 0–0.5)
IMM GRANULOCYTES NFR BLD AUTO: 1 % (ref 0–5)
KETONES UR QL STRIP.AUTO: NEGATIVE MG/DL
LEUKOCYTE ESTERASE UR QL STRIP.AUTO: NEGATIVE
LYMPHOCYTES # BLD: 2.4 K/UL (ref 0.5–4.6)
LYMPHOCYTES NFR BLD: 32 % (ref 13–44)
MCH RBC QN AUTO: 32.4 PG (ref 26.1–32.9)
MCHC RBC AUTO-ENTMCNC: 34 G/DL (ref 31.4–35)
MCV RBC AUTO: 95.2 FL (ref 82–102)
MONOCYTES # BLD: 0.8 K/UL (ref 0.1–1.3)
MONOCYTES NFR BLD: 11 % (ref 4–12)
NEUTS SEG # BLD: 3.9 K/UL (ref 1.7–8.2)
NEUTS SEG NFR BLD: 52 % (ref 43–78)
NITRITE UR QL STRIP.AUTO: NEGATIVE
NRBC # BLD: 0 K/UL (ref 0–0.2)
PH UR STRIP: 6.5 (ref 5–9)
PLATELET # BLD AUTO: 222 K/UL (ref 150–450)
PMV BLD AUTO: 11.3 FL (ref 9.4–12.3)
POTASSIUM SERPL-SCNC: 4.1 MMOL/L (ref 3.5–5.1)
PROT UR STRIP-MCNC: NEGATIVE MG/DL
RBC # BLD AUTO: 4.41 M/UL (ref 4.05–5.2)
SODIUM SERPL-SCNC: 137 MMOL/L (ref 136–146)
SP GR UR REFRACTOMETRY: 1 (ref 1–1.02)
UROBILINOGEN UR QL STRIP.AUTO: 0.2 EU/DL (ref 0.2–1)
WBC # BLD AUTO: 7.3 K/UL (ref 4.3–11.1)

## 2024-02-23 PROCEDURE — 81003 URINALYSIS AUTO W/O SCOPE: CPT

## 2024-02-23 PROCEDURE — 85025 COMPLETE CBC W/AUTO DIFF WBC: CPT

## 2024-02-23 PROCEDURE — 87641 MR-STAPH DNA AMP PROBE: CPT

## 2024-02-23 PROCEDURE — 80048 BASIC METABOLIC PNL TOTAL CA: CPT

## 2024-02-23 ASSESSMENT — PAIN DESCRIPTION - DESCRIPTORS: DESCRIPTORS: SHARP;ACHING

## 2024-02-23 ASSESSMENT — PAIN SCALES - GENERAL: PAINLEVEL_OUTOF10: 4

## 2024-02-23 ASSESSMENT — PAIN DESCRIPTION - ORIENTATION: ORIENTATION: RIGHT

## 2024-02-23 ASSESSMENT — PAIN DESCRIPTION - LOCATION: LOCATION: SHOULDER;NECK

## 2024-02-23 NOTE — PROGRESS NOTES
Patient verified name, , and surgery as listed in [facility]. Patient provided medical/health information and PTA medications to the best of their ability.    TYPE  CASE: 3  Orders per surgeon: received  Labs per surgeon: CBC w/ diff, BMP, UA, MRSA/MSSA. Results: pending  Labs per anesthesia protocol: T/S held DOS.   EKG: Not needed per anesthesia protocol     MRSA/MSSA swab collected; pharmacy to review and dose antibiotic as appropriate.     Patient provided with and instructed on education handouts including Guide to Surgery, blood transfusions, pain management, and hand hygiene for the family and community, and [facility] brochure.     Road to Recovery Spine surgery patient guide given. Instructed on incentive spirometry.    Hibiclens and instructions given per hospital policy.    Original medication prescription bottles were visualized during patient appointment.     Patient teach back successful and patient demonstrates knowledge of instruction.      How to Use Your Incentive Spirometer       About Your Incentive Spirometer  An incentive spirometer is a device that will expand your lungs by helping you to breathe more deeply and fully. The parts of your incentive spirometer are labeled in Figure 1.    Using your incentive spirometer  When you're using your incentive spirometer, make sure to breathe through your mouth. If you breathe through your nose, the incentive spirometer won't work properly. You can hold your nose if you have trouble. DO NOT BLOW INTO THE DEVICE. If you feel dizzy at any time, stop and rest. Try again at a later time.  Sit upright in a chair or in bed. Hold the incentive spirometer at eye level.   Put the mouthpiece in your mouth and close your lips tightly around it. Slowly breathe out (exhale) completely.  Breathe in (inhale) slowly through your mouth as deeply as you can. As you take the breath, you will see the piston rise inside the large column. While the  piston rises, the indicator on the right should move upwards. It should stay in between the 2 arrows (see Figure 1).  Try to get the piston as high as you can, while keeping the indicator between the arrows. If the indicator doesn't stay between the arrows, you're breathing either too fast or too slow.  When you get it as high as you can, hold your breath for 10 seconds, or as long as possible. While you're holding your breath, the piston will slowly fall to the base of the spirometer.  Once the piston reaches the bottom of the spirometer, breathe out slowly through your mouth. Rest for a few seconds.  Repeat 10 times. Try to get the piston to the same level with each breath.  After each set of 10 breaths, try to cough as coughing will help loosen or clear any mucus in your lungs.  Put the marker at the level the piston reached on your incentive spirometer. This will be your goal next time.  Repeat these steps every hour that you're awake.  Cover the mouthpiece of the incentive spirometer when you aren't using it    PLEASE CONTINUE TAKING ALL PRESCRIPTION MEDICATIONS UP TO THE DAY OF SURGERY UNLESS OTHERWISE DIRECTED BELOW. You may take Tylenol, allergy,  and/or indigestion medications.     TAKE ONLY THESE MEDICATIONS ON THE DAY OF SURGERY      Levothyroxine      Esomeprazole         DISCONTINUE all vitamins and supplements 7 days prior to surgery. DISCONTINUE Non-Steroidal Anti-Inflammatory (NSAIDS) such as Advil and Aleve 5 days prior to surgery.     Home Medications to Hold- please continue all other medications except these.      Celebrex- hold 5 days prior        Comments   Hibiclens shower the night before and the morning of surgery.    On the day before surgery (Tuesday) please take 2 Tylenol in the morning and then again before bed. You may use either regular or extra strength.      Bring book the day of surgery.       Please do not bring home medications with you on the day of surgery unless otherwise

## 2024-02-23 NOTE — PROGRESS NOTES
Preoperative Nutrition Screen (SHELLI)   Patient's Age: 63 y.o.    Last Serum Albumin Level:  Lab Results   Component Value Date/Time    ELEANORBU 3.0 12/14/2021 04:05 PM     Patient's BMI: Estimated body mass index is 36.35 kg/m² as calculated from the following:    Height as of this encounter: 1.524 m (5').    Weight as of this encounter: 84.4 kg (186 lb 1.6 oz).       1. Does the patient have a documented serum albumin less than 3.0 within the last 90 days?    Unknown = 0      2. Is patient's BMI less than 18.5 (or less than 20 if age over 65)?  No = 0       3. Has the patient had an unplanned weight loss of 10% of body weight or more in the last 6 months? No = 0   4. Has the patient been eating less than 50% of their normal diet in the preceding week? No = 0   SHELLI Score (number of Yes responses), 0-4 0     Plan:   No Nutrition Intervention indicated    Electronically signed by Sangita Schreiber RN on 2/23/24 at 10:29 AM EST

## 2024-02-23 NOTE — PROGRESS NOTES
Labs within anesthesia guidelines, no follow-up required. Labs automatically routed to ordering provider via Epic documentation.

## 2024-02-24 LAB
MRSA DNA SPEC QL NAA+PROBE: NOT DETECTED
S AUREUS CPE NOSE QL NAA+PROBE: NOT DETECTED

## 2024-02-26 NOTE — PROGRESS NOTES
Pre-Assessment Surgery Review      Patient ID:  Tameka Johnson  [id number]  63 y.o.  1960  Surgeon: Dr Tyson  Date of Surgery: 3//  Procedure:   laminectomy and C3-T1 posterior fusion   Primary Care Physician: Chai Kim [phone]  Specialty Physician(s):      Subjective:   Tameka Johnson is a 63 y.o. White (non-) female who presents for preoperative evaluation. This is a preoperative chart review note based on data collected by the nurse at the surgical Pre-Assessment visit.    Past Medical History:   Diagnosis Date    Acute pancreatitis     no issues currently    Acute pharyngitis     Anxiety and depression     Cervicalgia     Chondromalacia of patella     Chronic maxillary sinusitis     Chronic UTI     Gastroesophageal reflux disease     managed with med    Irritability     Lateral epicondylitis  of elbow     Lumbago     Migraines     Osteoarthritis     PONV (postoperative nausea and vomiting)     Postmenopausal atrophic vaginitis     Primary hypothyroidism     managed with med    Primary insomnia     Pure hypercholesterolemia     pt states not anymore; working on lowering it with diet change    Rosacea     Vitamin D deficiency       Past Surgical History:   Procedure Laterality Date    CARDIAC CATHETERIZATION  2013    normal    CERVICAL FUSION  2019    ANTERIOR CERVICAL DISCECTOMY W/ FUSION- Dr Tyson     SECTION      CHOLECYSTECTOMY, LAPAROSCOPIC      Dr López    COLONOSCOPY  2012    ESOPHAGOGASTRODUODENOSCOPY  2012    LUMBAR LAMINECTOMY      no fusion/hardware    TONSILLECTOMY      TOTAL KNEE ARTHROPLASTY Left 2020     Family History   Problem Relation Age of Onset    Hypertension Mother     Diabetes Mother     Gall Bladder Disease Mother     Hypothyroidism Mother     Stroke Maternal Grandmother     Hypothyroidism Maternal Aunt     Hypothyroidism Maternal Aunt       Social History     Tobacco Use    Smoking status: Former

## 2024-03-01 ENCOUNTER — PATIENT MESSAGE (OUTPATIENT)
Dept: ORTHOPEDIC SURGERY | Age: 64
End: 2024-03-01

## 2024-03-01 DIAGNOSIS — M54.12 CERVICAL RADICULOPATHY: Primary | ICD-10-CM

## 2024-03-01 NOTE — TELEPHONE ENCOUNTER
From: Tameka Johnson  To: Dr. Johnathan Lowe  Sent: 3/1/2024 12:33 PM EST  Subject: Medication refill    Would like a refill on medication...Methocarbamol 750 mg. For spasms.  Need refill ASAP.   I find this medication helps my pain more than the other 2 prescribed this far.  [facility] pharmacy on [address], [place]

## 2024-03-02 RX ORDER — METHOCARBAMOL 750 MG/1
750 TABLET, FILM COATED ORAL 4 TIMES DAILY
Qty: 60 TABLET | Refills: 0 | Status: SHIPPED | OUTPATIENT
Start: 2024-03-02 | End: 2024-03-17

## 2024-03-12 ENCOUNTER — PATIENT MESSAGE (OUTPATIENT)
Dept: ENDOCRINOLOGY | Age: 64
End: 2024-03-12

## 2024-03-12 DIAGNOSIS — E03.9 PRIMARY HYPOTHYROIDISM: ICD-10-CM

## 2024-03-14 RX ORDER — LEVOTHYROXINE SODIUM 137 UG/1
137 TABLET ORAL DAILY
Qty: 90 TABLET | Refills: 3 | Status: SHIPPED | OUTPATIENT
Start: 2024-03-14

## 2024-03-14 NOTE — TELEPHONE ENCOUNTER
3/6/2024: TSH 7.650, free T4 1.10, antithyroid peroxidase antibodies 163.00 (+), 25-hydroxy vitamin D 34.8.

## 2024-03-14 NOTE — TELEPHONE ENCOUNTER
From: Tameka Johnson  To: Dr. Bernardo Britt  Sent: 3/12/2024 1:33 PM EDT  Subject: Newest blood work    You should have received the blood work results by now that I had drawn last week. I have not heard from your office about these results. The plan was your office would contact me thru MyChart or by phone.   Would appreciate a call.  Thank you  Tameka Johnson

## 2024-03-14 NOTE — TELEPHONE ENCOUNTER
My Chart message to patient:    Thank you.  You are undertreated.  Please increase your levothyroxine dose to 137 mcg every day.  I sent a prescription to your pharmacy.  I will send you a lab order form to recheck labs in about 2 months.  Your vitamin D level is normal.  Let me know if you have questions.

## 2024-03-27 ENCOUNTER — HOSPITAL ENCOUNTER (OUTPATIENT)
Dept: SURGERY | Age: 64
Discharge: HOME | End: 2024-03-30
Payer: OTHER GOVERNMENT

## 2024-03-27 VITALS
OXYGEN SATURATION: 94 % | TEMPERATURE: 97.8 F | DIASTOLIC BLOOD PRESSURE: 80 MMHG | BODY MASS INDEX: 36.02 KG/M2 | SYSTOLIC BLOOD PRESSURE: 146 MMHG | WEIGHT: 183.5 LBS | HEIGHT: 60 IN | RESPIRATION RATE: 16 BRPM | HEART RATE: 81 BPM

## 2024-03-27 LAB
ANION GAP SERPL CALC-SCNC: 6 MMOL/L (ref 2–11)
APPEARANCE UR: CLEAR
BASOPHILS # BLD: 0.1 K/UL (ref 0–0.2)
BASOPHILS NFR BLD: 1 % (ref 0–2)
BILIRUB UR QL: NEGATIVE
BUN SERPL-MCNC: 10 MG/DL (ref 8–23)
CALCIUM SERPL-MCNC: 9.2 MG/DL (ref 8.3–10.4)
CHLORIDE SERPL-SCNC: 109 MMOL/L (ref 103–113)
CO2 SERPL-SCNC: 24 MMOL/L (ref 21–32)
COLOR UR: NORMAL
CREAT SERPL-MCNC: 1 MG/DL (ref 0.6–1)
DIFFERENTIAL METHOD BLD: ABNORMAL
EOSINOPHIL # BLD: 0.1 K/UL (ref 0–0.8)
EOSINOPHIL NFR BLD: 3 % (ref 0.5–7.8)
ERYTHROCYTE [DISTWIDTH] IN BLOOD BY AUTOMATED COUNT: 11.3 % (ref 11.9–14.6)
GLUCOSE SERPL-MCNC: 100 MG/DL (ref 65–100)
GLUCOSE UR STRIP.AUTO-MCNC: NEGATIVE MG/DL
HCT VFR BLD AUTO: 40.8 % (ref 35.8–46.3)
HGB BLD-MCNC: 14.2 G/DL (ref 11.7–15.4)
HGB UR QL STRIP: NEGATIVE
IMM GRANULOCYTES # BLD AUTO: 0 K/UL (ref 0–0.5)
IMM GRANULOCYTES NFR BLD AUTO: 0 % (ref 0–5)
KETONES UR QL STRIP.AUTO: NEGATIVE MG/DL
LEUKOCYTE ESTERASE UR QL STRIP.AUTO: NEGATIVE
LYMPHOCYTES # BLD: 1.8 K/UL (ref 0.5–4.6)
LYMPHOCYTES NFR BLD: 39 % (ref 13–44)
MCH RBC QN AUTO: 32.9 PG (ref 26.1–32.9)
MCHC RBC AUTO-ENTMCNC: 34.8 G/DL (ref 31.4–35)
MCV RBC AUTO: 94.4 FL (ref 82–102)
MONOCYTES # BLD: 0.4 K/UL (ref 0.1–1.3)
MONOCYTES NFR BLD: 9 % (ref 4–12)
NEUTS SEG # BLD: 2.3 K/UL (ref 1.7–8.2)
NEUTS SEG NFR BLD: 48 % (ref 43–78)
NITRITE UR QL STRIP.AUTO: NEGATIVE
NRBC # BLD: 0 K/UL (ref 0–0.2)
PH UR STRIP: 6.5 (ref 5–9)
PLATELET # BLD AUTO: 214 K/UL (ref 150–450)
PMV BLD AUTO: 11.7 FL (ref 9.4–12.3)
POTASSIUM SERPL-SCNC: 4 MMOL/L (ref 3.5–5.1)
PROT UR STRIP-MCNC: NEGATIVE MG/DL
RBC # BLD AUTO: 4.32 M/UL (ref 4.05–5.2)
SODIUM SERPL-SCNC: 139 MMOL/L (ref 136–146)
SP GR UR REFRACTOMETRY: <1.005 (ref 1–1.02)
UROBILINOGEN UR QL STRIP.AUTO: 0.2 EU/DL (ref 0.2–1)
WBC # BLD AUTO: 4.7 K/UL (ref 4.3–11.1)

## 2024-03-27 PROCEDURE — 85025 COMPLETE CBC W/AUTO DIFF WBC: CPT

## 2024-03-27 PROCEDURE — 87641 MR-STAPH DNA AMP PROBE: CPT

## 2024-03-27 PROCEDURE — 81003 URINALYSIS AUTO W/O SCOPE: CPT

## 2024-03-27 PROCEDURE — 80048 BASIC METABOLIC PNL TOTAL CA: CPT

## 2024-03-27 RX ORDER — BUPROPION HYDROCHLORIDE 150 MG/1
150 TABLET, EXTENDED RELEASE ORAL DAILY
COMMUNITY
Start: 2024-03-21

## 2024-03-27 ASSESSMENT — PAIN DESCRIPTION - DESCRIPTORS: DESCRIPTORS: DULL

## 2024-03-27 ASSESSMENT — PAIN SCALES - GENERAL: PAINLEVEL_OUTOF10: 4

## 2024-03-27 ASSESSMENT — PAIN DESCRIPTION - LOCATION: LOCATION: NECK

## 2024-03-27 ASSESSMENT — PAIN DESCRIPTION - ORIENTATION: ORIENTATION: UPPER

## 2024-03-27 NOTE — PROGRESS NOTES
Preoperative Nutrition Screen (SHELLI)   Patient's Age: 63 y.o.    Last Serum Albumin Level:  Lab Results   Component Value Date/Time    LABALBU 3.0 12/14/2021 04:05 PM     Patient's BMI: Estimated body mass index is 35.84 kg/m² as calculated from the following:    Height as of this encounter: 1.524 m (5').    Weight as of this encounter: 83.2 kg (183 lb 8 oz).       1. Does the patient have a documented serum albumin less than 3.0 within the last 90 days?    Unknown = 0      2. Is patient's BMI less than 18.5 (or less than 20 if age over 65)?  No = 0       3. Has the patient had an unplanned weight loss of 10% of body weight or more in the last 6 months? No = 0   4. Has the patient been eating less than 50% of their normal diet in the preceding week? No = 0   SHELLI Score (number of Yes responses), 0-4 0     Plan:   No Nutrition Intervention indicated    Electronically signed by Sangita Schreiber RN on 3/27/24 at 11:41 AM EDT

## 2024-03-27 NOTE — PROGRESS NOTES
Patient verified name, , and surgery as listed in [facility]. Patient provided medical/health information and PTA medications to the best of their ability.    TYPE  CASE: 3  Orders per surgeon: received  Labs per surgeon: CBC w/ diff, BMP, UA, MRSA/MSSA. Results: pending  Labs per anesthesia protocol: T/S held DOS.  EKG: Not needed per anesthesia protocol     MRSA/MSSA swab collected; pharmacy to review and dose antibiotic as appropriate.     Patient provided with and instructed on education handouts including Guide to Surgery, blood transfusions, pain management, and hand hygiene for the family and community, and [facility] brochure.     Road to Recovery Spine surgery patient guide given. Instructed on incentive spirometry.    Hibiclens and instructions given per hospital policy.    Original medication prescription bottles were visualized during patient appointment.     Patient teach back successful and patient demonstrates knowledge of instruction.      How to Use Your Incentive Spirometer       About Your Incentive Spirometer  An incentive spirometer is a device that will expand your lungs by helping you to breathe more deeply and fully. The parts of your incentive spirometer are labeled in Figure 1.    Using your incentive spirometer  When you're using your incentive spirometer, make sure to breathe through your mouth. If you breathe through your nose, the incentive spirometer won't work properly. You can hold your nose if you have trouble. DO NOT BLOW INTO THE DEVICE. If you feel dizzy at any time, stop and rest. Try again at a later time.  Sit upright in a chair or in bed. Hold the incentive spirometer at eye level.   Put the mouthpiece in your mouth and close your lips tightly around it. Slowly breathe out (exhale) completely.  Breathe in (inhale) slowly through your mouth as deeply as you can. As you take the breath, you will see the piston rise inside the large column. While the

## 2024-03-28 LAB
MRSA DNA SPEC QL NAA+PROBE: NOT DETECTED
S AUREUS CPE NOSE QL NAA+PROBE: NOT DETECTED

## 2024-04-09 ENCOUNTER — ANESTHESIA EVENT (OUTPATIENT)
Dept: SURGERY | Age: 64
End: 2024-04-09
Payer: OTHER GOVERNMENT

## 2024-04-10 ENCOUNTER — HOSPITAL ENCOUNTER (INPATIENT)
Age: 64
LOS: 2 days | Discharge: HOME | End: 2024-04-12
Attending: ORTHOPAEDIC SURGERY | Admitting: ORTHOPAEDIC SURGERY
Payer: OTHER GOVERNMENT

## 2024-04-10 ENCOUNTER — APPOINTMENT (OUTPATIENT)
Dept: GENERAL RADIOLOGY | Age: 64
End: 2024-04-10
Attending: ORTHOPAEDIC SURGERY
Payer: OTHER GOVERNMENT

## 2024-04-10 ENCOUNTER — ANESTHESIA (OUTPATIENT)
Dept: SURGERY | Age: 64
End: 2024-04-10
Payer: OTHER GOVERNMENT

## 2024-04-10 DIAGNOSIS — M54.12 CERVICAL RADICULOPATHY: Primary | ICD-10-CM

## 2024-04-10 PROBLEM — Z98.1 S/P CERVICAL SPINAL FUSION: Status: ACTIVE | Noted: 2024-04-10

## 2024-04-10 LAB
ABO + RH BLD: NORMAL
BLOOD GROUP ANTIBODIES SERPL: NORMAL
SPECIMEN EXP DATE BLD: NORMAL

## 2024-04-10 PROCEDURE — 6370000000 HC RX 637 (ALT 250 FOR IP): Performed by: ORTHOPAEDIC SURGERY

## 2024-04-10 PROCEDURE — 0RG40K1 FUSION OF CERVICOTHORACIC VERTEBRAL JOINT WITH NONAUTOLOGOUS TISSUE SUBSTITUTE, POSTERIOR APPROACH, POSTERIOR COLUMN, OPEN APPROACH: ICD-10-PCS | Performed by: ORTHOPAEDIC SURGERY

## 2024-04-10 PROCEDURE — 6370000000 HC RX 637 (ALT 250 FOR IP): Performed by: ANESTHESIOLOGY

## 2024-04-10 PROCEDURE — C1889 IMPLANT/INSERT DEVICE, NOC: HCPCS | Performed by: ORTHOPAEDIC SURGERY

## 2024-04-10 PROCEDURE — 3600000004 HC SURGERY LEVEL 4 BASE: Performed by: ORTHOPAEDIC SURGERY

## 2024-04-10 PROCEDURE — 63045 LAM FACETEC & FORAMOT CRV: CPT | Performed by: ORTHOPAEDIC SURGERY

## 2024-04-10 PROCEDURE — 3700000001 HC ADD 15 MINUTES (ANESTHESIA): Performed by: ORTHOPAEDIC SURGERY

## 2024-04-10 PROCEDURE — 6360000002 HC RX W HCPCS: Performed by: ANESTHESIOLOGY

## 2024-04-10 PROCEDURE — 6360000002 HC RX W HCPCS: Performed by: NURSE ANESTHETIST, CERTIFIED REGISTERED

## 2024-04-10 PROCEDURE — 2580000003 HC RX 258: Performed by: ORTHOPAEDIC SURGERY

## 2024-04-10 PROCEDURE — 1100000000 HC RM PRIVATE

## 2024-04-10 PROCEDURE — 0RG20K1 FUSION OF 2 OR MORE CERVICAL VERTEBRAL JOINTS WITH NONAUTOLOGOUS TISSUE SUBSTITUTE, POSTERIOR APPROACH, POSTERIOR COLUMN, OPEN APPROACH: ICD-10-PCS | Performed by: ORTHOPAEDIC SURGERY

## 2024-04-10 PROCEDURE — 2500000003 HC RX 250 WO HCPCS: Performed by: NURSE ANESTHETIST, CERTIFIED REGISTERED

## 2024-04-10 PROCEDURE — 2709999900 HC NON-CHARGEABLE SUPPLY: Performed by: ORTHOPAEDIC SURGERY

## 2024-04-10 PROCEDURE — 4A11X4G MONITORING OF PERIPHERAL NERVOUS ELECTRICAL ACTIVITY, INTRAOPERATIVE, EXTERNAL APPROACH: ICD-10-PCS | Performed by: ORTHOPAEDIC SURGERY

## 2024-04-10 PROCEDURE — 20930 SP BONE ALGRFT MORSEL ADD-ON: CPT | Performed by: ORTHOPAEDIC SURGERY

## 2024-04-10 PROCEDURE — 86900 BLOOD TYPING SEROLOGIC ABO: CPT

## 2024-04-10 PROCEDURE — 3700000000 HC ANESTHESIA ATTENDED CARE: Performed by: ORTHOPAEDIC SURGERY

## 2024-04-10 PROCEDURE — 3600000014 HC SURGERY LEVEL 4 ADDTL 15MIN: Performed by: ORTHOPAEDIC SURGERY

## 2024-04-10 PROCEDURE — 86850 RBC ANTIBODY SCREEN: CPT

## 2024-04-10 PROCEDURE — 7100000001 HC PACU RECOVERY - ADDTL 15 MIN: Performed by: ORTHOPAEDIC SURGERY

## 2024-04-10 PROCEDURE — 22614 ARTHRD PST TQ 1NTRSPC EA ADD: CPT | Performed by: ORTHOPAEDIC SURGERY

## 2024-04-10 PROCEDURE — C1713 ANCHOR/SCREW BN/BN,TIS/BN: HCPCS | Performed by: ORTHOPAEDIC SURGERY

## 2024-04-10 PROCEDURE — 2580000003 HC RX 258: Performed by: ANESTHESIOLOGY

## 2024-04-10 PROCEDURE — 6360000002 HC RX W HCPCS: Performed by: ORTHOPAEDIC SURGERY

## 2024-04-10 PROCEDURE — 2720000010 HC SURG SUPPLY STERILE: Performed by: ORTHOPAEDIC SURGERY

## 2024-04-10 PROCEDURE — 22600 ARTHRD PST TQ 1NTRSPC CRV: CPT | Performed by: ORTHOPAEDIC SURGERY

## 2024-04-10 PROCEDURE — 2500000003 HC RX 250 WO HCPCS: Performed by: ORTHOPAEDIC SURGERY

## 2024-04-10 PROCEDURE — 63048 LAM FACETEC &FORAMOT EA ADDL: CPT | Performed by: ORTHOPAEDIC SURGERY

## 2024-04-10 PROCEDURE — 7100000000 HC PACU RECOVERY - FIRST 15 MIN: Performed by: ORTHOPAEDIC SURGERY

## 2024-04-10 PROCEDURE — 22842 INSERT SPINE FIXATION DEVICE: CPT | Performed by: ORTHOPAEDIC SURGERY

## 2024-04-10 PROCEDURE — 86901 BLOOD TYPING SEROLOGIC RH(D): CPT

## 2024-04-10 PROCEDURE — 72040 X-RAY EXAM NECK SPINE 2-3 VW: CPT

## 2024-04-10 DEVICE — POLYAXIAL SCREW
Type: IMPLANTABLE DEVICE | Site: SPINE CERVICAL | Status: FUNCTIONAL
Brand: OASYS

## 2024-04-10 DEVICE — ROD
Type: IMPLANTABLE DEVICE | Site: SPINE CERVICAL | Status: FUNCTIONAL
Brand: OASYS

## 2024-04-10 DEVICE — BIO DBM PLUS PUTTY (WITH CANCELLOUS)
Type: IMPLANTABLE DEVICE | Site: SPINE CERVICAL | Status: FUNCTIONAL
Brand: BIO DBM

## 2024-04-10 DEVICE — GRAFT BNE LG: Type: IMPLANTABLE DEVICE | Site: SPINE CERVICAL | Status: FUNCTIONAL

## 2024-04-10 DEVICE — BLOCKER
Type: IMPLANTABLE DEVICE | Site: SPINE CERVICAL | Status: FUNCTIONAL
Brand: OASYS

## 2024-04-10 DEVICE — ALLOGRAFT BNE CHIP 1-4 MM 15 CC CRUSH CANC: Type: IMPLANTABLE DEVICE | Site: SPINE CERVICAL | Status: FUNCTIONAL

## 2024-04-10 RX ORDER — LIDOCAINE HYDROCHLORIDE 20 MG/ML
INJECTION, SOLUTION EPIDURAL; INFILTRATION; INTRACAUDAL; PERINEURAL PRN
Status: DISCONTINUED | OUTPATIENT
Start: 2024-04-10 | End: 2024-04-10 | Stop reason: SDUPTHER

## 2024-04-10 RX ORDER — MIDAZOLAM HYDROCHLORIDE 2 MG/2ML
2 INJECTION, SOLUTION INTRAMUSCULAR; INTRAVENOUS
Status: DISCONTINUED | OUTPATIENT
Start: 2024-04-10 | End: 2024-04-10 | Stop reason: HOSPADM

## 2024-04-10 RX ORDER — ESTERIFIED ESTROGEN AND METHYLTESTOSTERONE .625; 1.25 MG/1; MG/1
1 TABLET ORAL NIGHTLY
Status: DISCONTINUED | OUTPATIENT
Start: 2024-04-10 | End: 2024-04-12 | Stop reason: HOSPADM

## 2024-04-10 RX ORDER — MIDAZOLAM HYDROCHLORIDE 1 MG/ML
INJECTION INTRAMUSCULAR; INTRAVENOUS PRN
Status: DISCONTINUED | OUTPATIENT
Start: 2024-04-10 | End: 2024-04-10 | Stop reason: SDUPTHER

## 2024-04-10 RX ORDER — METHOCARBAMOL 750 MG/1
750 TABLET, FILM COATED ORAL EVERY 8 HOURS
Status: COMPLETED | OUTPATIENT
Start: 2024-04-10 | End: 2024-04-11

## 2024-04-10 RX ORDER — DIPHENHYDRAMINE HYDROCHLORIDE 50 MG/ML
25 INJECTION INTRAMUSCULAR; INTRAVENOUS EVERY 6 HOURS PRN
Status: DISCONTINUED | OUTPATIENT
Start: 2024-04-10 | End: 2024-04-12 | Stop reason: HOSPADM

## 2024-04-10 RX ORDER — BISACODYL 5 MG/1
5 TABLET, DELAYED RELEASE ORAL DAILY
Status: DISCONTINUED | OUTPATIENT
Start: 2024-04-10 | End: 2024-04-12 | Stop reason: HOSPADM

## 2024-04-10 RX ORDER — MEDROXYPROGESTERONE ACETATE 2.5 MG/1
2.5 TABLET ORAL NIGHTLY
Status: DISCONTINUED | OUTPATIENT
Start: 2024-04-10 | End: 2024-04-12 | Stop reason: HOSPADM

## 2024-04-10 RX ORDER — NEOSTIGMINE METHYLSULFATE 1 MG/ML
INJECTION, SOLUTION INTRAVENOUS PRN
Status: DISCONTINUED | OUTPATIENT
Start: 2024-04-10 | End: 2024-04-10 | Stop reason: SDUPTHER

## 2024-04-10 RX ORDER — DIPHENHYDRAMINE HCL 25 MG
25 CAPSULE ORAL EVERY 6 HOURS PRN
Status: DISCONTINUED | OUTPATIENT
Start: 2024-04-10 | End: 2024-04-12 | Stop reason: HOSPADM

## 2024-04-10 RX ORDER — OXYCODONE HYDROCHLORIDE 5 MG/1
5 TABLET ORAL EVERY 6 HOURS PRN
Qty: 28 TABLET | Refills: 0 | Status: SHIPPED | OUTPATIENT
Start: 2024-04-10 | End: 2024-04-17

## 2024-04-10 RX ORDER — PROMETHAZINE HYDROCHLORIDE 12.5 MG/1
12.5 TABLET ORAL EVERY 6 HOURS PRN
Status: DISCONTINUED | OUTPATIENT
Start: 2024-04-10 | End: 2024-04-12 | Stop reason: HOSPADM

## 2024-04-10 RX ORDER — LIDOCAINE HYDROCHLORIDE ANHYDROUS AND DEXTROSE MONOHYDRATE 5; 400 G/100ML; MG/100ML
1 INJECTION, SOLUTION INTRAVENOUS CONTINUOUS
Status: DISCONTINUED | OUTPATIENT
Start: 2024-04-10 | End: 2024-04-11

## 2024-04-10 RX ORDER — BUPROPION HYDROCHLORIDE 300 MG/1
300 TABLET ORAL DAILY
Status: DISCONTINUED | OUTPATIENT
Start: 2024-04-11 | End: 2024-04-12 | Stop reason: HOSPADM

## 2024-04-10 RX ORDER — OXYCODONE HYDROCHLORIDE 5 MG/1
10 TABLET ORAL EVERY 4 HOURS PRN
Status: DISCONTINUED | OUTPATIENT
Start: 2024-04-10 | End: 2024-04-12 | Stop reason: HOSPADM

## 2024-04-10 RX ORDER — PROPOFOL 10 MG/ML
INJECTION, EMULSION INTRAVENOUS PRN
Status: DISCONTINUED | OUTPATIENT
Start: 2024-04-10 | End: 2024-04-10 | Stop reason: SDUPTHER

## 2024-04-10 RX ORDER — METHADONE HYDROCHLORIDE 10 MG/1
10 TABLET ORAL ONCE
Status: COMPLETED | OUTPATIENT
Start: 2024-04-10 | End: 2024-04-10

## 2024-04-10 RX ORDER — LABETALOL HYDROCHLORIDE 5 MG/ML
10 INJECTION, SOLUTION INTRAVENOUS ONCE
Status: COMPLETED | OUTPATIENT
Start: 2024-04-10 | End: 2024-04-10

## 2024-04-10 RX ORDER — KETAMINE HCL IN NACL, ISO-OSM 20 MG/2 ML
SYRINGE (ML) INJECTION PRN
Status: DISCONTINUED | OUTPATIENT
Start: 2024-04-10 | End: 2024-04-10 | Stop reason: SDUPTHER

## 2024-04-10 RX ORDER — BUPROPION HYDROCHLORIDE 150 MG/1
150 TABLET, EXTENDED RELEASE ORAL DAILY
Status: DISCONTINUED | OUTPATIENT
Start: 2024-04-10 | End: 2024-04-10 | Stop reason: SDUPTHER

## 2024-04-10 RX ORDER — NALOXONE HYDROCHLORIDE 0.4 MG/ML
INJECTION, SOLUTION INTRAMUSCULAR; INTRAVENOUS; SUBCUTANEOUS PRN
Status: DISCONTINUED | OUTPATIENT
Start: 2024-04-10 | End: 2024-04-10 | Stop reason: HOSPADM

## 2024-04-10 RX ORDER — SODIUM CHLORIDE 0.9 % (FLUSH) 0.9 %
5-40 SYRINGE (ML) INJECTION PRN
Status: DISCONTINUED | OUTPATIENT
Start: 2024-04-10 | End: 2024-04-10 | Stop reason: HOSPADM

## 2024-04-10 RX ORDER — ACETAMINOPHEN 500 MG
1000 TABLET ORAL ONCE
Status: COMPLETED | OUTPATIENT
Start: 2024-04-10 | End: 2024-04-10

## 2024-04-10 RX ORDER — KETOROLAC TROMETHAMINE 30 MG/ML
30 INJECTION, SOLUTION INTRAMUSCULAR; INTRAVENOUS EVERY 6 HOURS
Status: COMPLETED | OUTPATIENT
Start: 2024-04-10 | End: 2024-04-11

## 2024-04-10 RX ORDER — ONDANSETRON 2 MG/ML
4 INJECTION INTRAMUSCULAR; INTRAVENOUS EVERY 6 HOURS PRN
Status: DISCONTINUED | OUTPATIENT
Start: 2024-04-10 | End: 2024-04-12 | Stop reason: HOSPADM

## 2024-04-10 RX ORDER — SCOLOPAMINE TRANSDERMAL SYSTEM 1 MG/1
1 PATCH, EXTENDED RELEASE TRANSDERMAL ONCE
Status: DISCONTINUED | OUTPATIENT
Start: 2024-04-10 | End: 2024-04-10

## 2024-04-10 RX ORDER — SODIUM CHLORIDE 9 MG/ML
INJECTION, SOLUTION INTRAVENOUS PRN
Status: DISCONTINUED | OUTPATIENT
Start: 2024-04-10 | End: 2024-04-12 | Stop reason: HOSPADM

## 2024-04-10 RX ORDER — DEXAMETHASONE SODIUM PHOSPHATE 10 MG/ML
INJECTION INTRAMUSCULAR; INTRAVENOUS PRN
Status: DISCONTINUED | OUTPATIENT
Start: 2024-04-10 | End: 2024-04-10 | Stop reason: SDUPTHER

## 2024-04-10 RX ORDER — FAMOTIDINE 20 MG/1
20 TABLET, FILM COATED ORAL NIGHTLY
Status: DISCONTINUED | OUTPATIENT
Start: 2024-04-10 | End: 2024-04-12 | Stop reason: HOSPADM

## 2024-04-10 RX ORDER — LIDOCAINE HYDROCHLORIDE ANHYDROUS AND DEXTROSE MONOHYDRATE 5; 400 G/100ML; MG/100ML
INJECTION, SOLUTION INTRAVENOUS CONTINUOUS PRN
Status: DISCONTINUED | OUTPATIENT
Start: 2024-04-10 | End: 2024-04-10 | Stop reason: SDUPTHER

## 2024-04-10 RX ORDER — HALOPERIDOL 5 MG/ML
1 INJECTION INTRAMUSCULAR
Status: COMPLETED | OUTPATIENT
Start: 2024-04-10 | End: 2024-04-10

## 2024-04-10 RX ORDER — SODIUM CHLORIDE 0.9 % (FLUSH) 0.9 %
5-40 SYRINGE (ML) INJECTION EVERY 12 HOURS SCHEDULED
Status: DISCONTINUED | OUTPATIENT
Start: 2024-04-10 | End: 2024-04-10 | Stop reason: HOSPADM

## 2024-04-10 RX ORDER — EPHEDRINE SULFATE 5 MG/ML
INJECTION INTRAVENOUS PRN
Status: DISCONTINUED | OUTPATIENT
Start: 2024-04-10 | End: 2024-04-10 | Stop reason: SDUPTHER

## 2024-04-10 RX ORDER — VANCOMYCIN HYDROCHLORIDE 1 G/20ML
INJECTION, POWDER, LYOPHILIZED, FOR SOLUTION INTRAVENOUS PRN
Status: DISCONTINUED | OUTPATIENT
Start: 2024-04-10 | End: 2024-04-10 | Stop reason: ALTCHOICE

## 2024-04-10 RX ORDER — PROCHLORPERAZINE EDISYLATE 5 MG/ML
5 INJECTION INTRAMUSCULAR; INTRAVENOUS
Status: DISCONTINUED | OUTPATIENT
Start: 2024-04-10 | End: 2024-04-10 | Stop reason: HOSPADM

## 2024-04-10 RX ORDER — SODIUM CHLORIDE, SODIUM LACTATE, POTASSIUM CHLORIDE, CALCIUM CHLORIDE 600; 310; 30; 20 MG/100ML; MG/100ML; MG/100ML; MG/100ML
INJECTION, SOLUTION INTRAVENOUS CONTINUOUS
Status: DISCONTINUED | OUTPATIENT
Start: 2024-04-10 | End: 2024-04-10 | Stop reason: HOSPADM

## 2024-04-10 RX ORDER — PANTOPRAZOLE SODIUM 40 MG/1
40 TABLET, DELAYED RELEASE ORAL
Status: DISCONTINUED | OUTPATIENT
Start: 2024-04-11 | End: 2024-04-10

## 2024-04-10 RX ORDER — FENTANYL CITRATE 50 UG/ML
100 INJECTION, SOLUTION INTRAMUSCULAR; INTRAVENOUS
Status: DISCONTINUED | OUTPATIENT
Start: 2024-04-10 | End: 2024-04-10 | Stop reason: HOSPADM

## 2024-04-10 RX ORDER — SODIUM CHLORIDE 9 MG/ML
INJECTION, SOLUTION INTRAVENOUS PRN
Status: DISCONTINUED | OUTPATIENT
Start: 2024-04-10 | End: 2024-04-10 | Stop reason: HOSPADM

## 2024-04-10 RX ORDER — HYDROMORPHONE HYDROCHLORIDE 2 MG/ML
INJECTION, SOLUTION INTRAMUSCULAR; INTRAVENOUS; SUBCUTANEOUS PRN
Status: DISCONTINUED | OUTPATIENT
Start: 2024-04-10 | End: 2024-04-10 | Stop reason: SDUPTHER

## 2024-04-10 RX ORDER — FENTANYL CITRATE 50 UG/ML
INJECTION, SOLUTION INTRAMUSCULAR; INTRAVENOUS PRN
Status: DISCONTINUED | OUTPATIENT
Start: 2024-04-10 | End: 2024-04-10 | Stop reason: SDUPTHER

## 2024-04-10 RX ORDER — GLYCOPYRROLATE 0.2 MG/ML
INJECTION INTRAMUSCULAR; INTRAVENOUS PRN
Status: DISCONTINUED | OUTPATIENT
Start: 2024-04-10 | End: 2024-04-10 | Stop reason: SDUPTHER

## 2024-04-10 RX ORDER — SODIUM CHLORIDE 0.9 % (FLUSH) 0.9 %
5-40 SYRINGE (ML) INJECTION PRN
Status: DISCONTINUED | OUTPATIENT
Start: 2024-04-10 | End: 2024-04-12 | Stop reason: HOSPADM

## 2024-04-10 RX ORDER — SODIUM CHLORIDE 0.9 % (FLUSH) 0.9 %
5-40 SYRINGE (ML) INJECTION EVERY 12 HOURS SCHEDULED
Status: DISCONTINUED | OUTPATIENT
Start: 2024-04-10 | End: 2024-04-12 | Stop reason: HOSPADM

## 2024-04-10 RX ORDER — HYDROMORPHONE HYDROCHLORIDE 1 MG/ML
0.5 INJECTION, SOLUTION INTRAMUSCULAR; INTRAVENOUS; SUBCUTANEOUS
Status: DISCONTINUED | OUTPATIENT
Start: 2024-04-10 | End: 2024-04-12 | Stop reason: HOSPADM

## 2024-04-10 RX ORDER — SODIUM CHLORIDE 9 MG/ML
INJECTION, SOLUTION INTRAVENOUS CONTINUOUS
Status: DISCONTINUED | OUTPATIENT
Start: 2024-04-10 | End: 2024-04-11

## 2024-04-10 RX ORDER — TRANEXAMIC ACID 100 MG/ML
INJECTION, SOLUTION INTRAVENOUS PRN
Status: DISCONTINUED | OUTPATIENT
Start: 2024-04-10 | End: 2024-04-10 | Stop reason: SDUPTHER

## 2024-04-10 RX ORDER — ONDANSETRON 2 MG/ML
INJECTION INTRAMUSCULAR; INTRAVENOUS PRN
Status: DISCONTINUED | OUTPATIENT
Start: 2024-04-10 | End: 2024-04-10 | Stop reason: SDUPTHER

## 2024-04-10 RX ORDER — ACETAMINOPHEN 325 MG/1
650 TABLET ORAL EVERY 6 HOURS
Status: DISCONTINUED | OUTPATIENT
Start: 2024-04-10 | End: 2024-04-12 | Stop reason: HOSPADM

## 2024-04-10 RX ORDER — HYDROMORPHONE HYDROCHLORIDE 2 MG/ML
0.5 INJECTION, SOLUTION INTRAMUSCULAR; INTRAVENOUS; SUBCUTANEOUS EVERY 5 MIN PRN
Status: COMPLETED | OUTPATIENT
Start: 2024-04-10 | End: 2024-04-10

## 2024-04-10 RX ORDER — OXYCODONE HYDROCHLORIDE 5 MG/1
5 TABLET ORAL
Status: DISCONTINUED | OUTPATIENT
Start: 2024-04-10 | End: 2024-04-10 | Stop reason: HOSPADM

## 2024-04-10 RX ORDER — POLYETHYLENE GLYCOL 3350 17 G/17G
17 POWDER, FOR SOLUTION ORAL DAILY
Status: DISCONTINUED | OUTPATIENT
Start: 2024-04-10 | End: 2024-04-12 | Stop reason: HOSPADM

## 2024-04-10 RX ORDER — ACETAMINOPHEN 500 MG
1000 TABLET ORAL ONCE
Status: DISCONTINUED | OUTPATIENT
Start: 2024-04-10 | End: 2024-04-10 | Stop reason: HOSPADM

## 2024-04-10 RX ORDER — ROCURONIUM BROMIDE 10 MG/ML
INJECTION, SOLUTION INTRAVENOUS PRN
Status: DISCONTINUED | OUTPATIENT
Start: 2024-04-10 | End: 2024-04-10 | Stop reason: SDUPTHER

## 2024-04-10 RX ORDER — LIDOCAINE HYDROCHLORIDE 10 MG/ML
1 INJECTION, SOLUTION INFILTRATION; PERINEURAL
Status: DISCONTINUED | OUTPATIENT
Start: 2024-04-10 | End: 2024-04-10 | Stop reason: HOSPADM

## 2024-04-10 RX ORDER — OXYCODONE HYDROCHLORIDE 5 MG/1
5 TABLET ORAL EVERY 4 HOURS PRN
Status: DISCONTINUED | OUTPATIENT
Start: 2024-04-10 | End: 2024-04-12 | Stop reason: HOSPADM

## 2024-04-10 RX ADMIN — HYDROMORPHONE HYDROCHLORIDE 0.5 MG: 2 INJECTION INTRAMUSCULAR; INTRAVENOUS; SUBCUTANEOUS at 17:53

## 2024-04-10 RX ADMIN — FAMOTIDINE 20 MG: 20 TABLET, FILM COATED ORAL at 22:32

## 2024-04-10 RX ADMIN — ONDANSETRON 4 MG: 2 INJECTION INTRAMUSCULAR; INTRAVENOUS at 22:37

## 2024-04-10 RX ADMIN — EPHEDRINE SULFATE 10 MG: 5 INJECTION INTRAVENOUS at 14:52

## 2024-04-10 RX ADMIN — Medication 2000 MG: at 14:43

## 2024-04-10 RX ADMIN — LIDOCAINE HYDROCHLORIDE 1 MG/KG/HR: 4 INJECTION, SOLUTION INTRAVENOUS at 17:58

## 2024-04-10 RX ADMIN — PROPOFOL 180 MG: 10 INJECTION, EMULSION INTRAVENOUS at 14:36

## 2024-04-10 RX ADMIN — CEFAZOLIN SODIUM 2000 MG: 100 INJECTION, POWDER, LYOPHILIZED, FOR SOLUTION INTRAVENOUS at 22:35

## 2024-04-10 RX ADMIN — SODIUM CHLORIDE, PRESERVATIVE FREE 5 ML: 5 INJECTION INTRAVENOUS at 22:45

## 2024-04-10 RX ADMIN — Medication 20 MG: at 14:36

## 2024-04-10 RX ADMIN — Medication 2 MG: at 16:45

## 2024-04-10 RX ADMIN — BISACODYL 5 MG: 5 TABLET, COATED ORAL at 22:50

## 2024-04-10 RX ADMIN — MIDAZOLAM 2 MG: 1 INJECTION INTRAMUSCULAR; INTRAVENOUS at 14:35

## 2024-04-10 RX ADMIN — LABETALOL HYDROCHLORIDE 10 MG: 5 INJECTION, SOLUTION INTRAVENOUS at 17:20

## 2024-04-10 RX ADMIN — Medication 3 MG: at 16:44

## 2024-04-10 RX ADMIN — ACETAMINOPHEN 1000 MG: 500 TABLET, FILM COATED ORAL at 13:10

## 2024-04-10 RX ADMIN — LIDOCAINE HYDROCHLORIDE 80 MG: 20 INJECTION, SOLUTION EPIDURAL; INFILTRATION; INTRACAUDAL; PERINEURAL at 14:36

## 2024-04-10 RX ADMIN — KETOROLAC TROMETHAMINE 30 MG: 30 INJECTION, SOLUTION INTRAMUSCULAR at 22:33

## 2024-04-10 RX ADMIN — METHOCARBAMOL TABLETS 750 MG: 750 TABLET, COATED ORAL at 22:33

## 2024-04-10 RX ADMIN — ROCURONIUM BROMIDE 10 MG: 10 INJECTION, SOLUTION INTRAVENOUS at 16:10

## 2024-04-10 RX ADMIN — PHENYLEPHRINE HYDROCHLORIDE 100 MCG: 0.1 INJECTION, SOLUTION INTRAVENOUS at 15:07

## 2024-04-10 RX ADMIN — ROCURONIUM BROMIDE 10 MG: 10 INJECTION, SOLUTION INTRAVENOUS at 15:13

## 2024-04-10 RX ADMIN — LIDOCAINE HYDROCHLORIDE 1.5 MG/KG/HR: 4 INJECTION, SOLUTION INTRAVENOUS at 14:49

## 2024-04-10 RX ADMIN — GLYCOPYRROLATE 0.2 MG: 0.2 INJECTION INTRAMUSCULAR; INTRAVENOUS at 16:45

## 2024-04-10 RX ADMIN — ONDANSETRON 4 MG: 2 INJECTION INTRAMUSCULAR; INTRAVENOUS at 14:43

## 2024-04-10 RX ADMIN — SODIUM CHLORIDE, SODIUM LACTATE, POTASSIUM CHLORIDE, AND CALCIUM CHLORIDE: 600; 310; 30; 20 INJECTION, SOLUTION INTRAVENOUS at 15:14

## 2024-04-10 RX ADMIN — GLYCOPYRROLATE 0.4 MG: 0.2 INJECTION INTRAMUSCULAR; INTRAVENOUS at 16:44

## 2024-04-10 RX ADMIN — ACETAMINOPHEN 650 MG: 325 TABLET ORAL at 22:32

## 2024-04-10 RX ADMIN — Medication 10 MG: at 15:49

## 2024-04-10 RX ADMIN — PHENYLEPHRINE HYDROCHLORIDE 100 MCG: 0.1 INJECTION, SOLUTION INTRAVENOUS at 14:56

## 2024-04-10 RX ADMIN — Medication 3 AMPULE: at 13:07

## 2024-04-10 RX ADMIN — HYDROMORPHONE HYDROCHLORIDE 1 MG: 2 INJECTION INTRAMUSCULAR; INTRAVENOUS; SUBCUTANEOUS at 15:13

## 2024-04-10 RX ADMIN — METHADONE HYDROCHLORIDE 10 MG: 10 TABLET ORAL at 13:56

## 2024-04-10 RX ADMIN — FENTANYL CITRATE 100 MCG: 50 INJECTION, SOLUTION INTRAMUSCULAR; INTRAVENOUS at 14:36

## 2024-04-10 RX ADMIN — MEDROXYPROGESTERONE ACETATE 2.5 MG: 2.5 TABLET ORAL at 22:48

## 2024-04-10 RX ADMIN — HYDROMORPHONE HYDROCHLORIDE 0.5 MG: 2 INJECTION INTRAMUSCULAR; INTRAVENOUS; SUBCUTANEOUS at 17:20

## 2024-04-10 RX ADMIN — HALOPERIDOL LACTATE 1 MG: 5 INJECTION, SOLUTION INTRAMUSCULAR at 18:27

## 2024-04-10 RX ADMIN — HYDROMORPHONE HYDROCHLORIDE 0.5 MG: 1 INJECTION, SOLUTION INTRAMUSCULAR; INTRAVENOUS; SUBCUTANEOUS at 22:42

## 2024-04-10 RX ADMIN — SODIUM CHLORIDE: 9 INJECTION, SOLUTION INTRAVENOUS at 18:05

## 2024-04-10 RX ADMIN — SODIUM CHLORIDE, SODIUM LACTATE, POTASSIUM CHLORIDE, AND CALCIUM CHLORIDE: 600; 310; 30; 20 INJECTION, SOLUTION INTRAVENOUS at 13:07

## 2024-04-10 RX ADMIN — EPHEDRINE SULFATE 5 MG: 5 INJECTION INTRAVENOUS at 14:55

## 2024-04-10 RX ADMIN — ROCURONIUM BROMIDE 40 MG: 10 INJECTION, SOLUTION INTRAVENOUS at 14:37

## 2024-04-10 RX ADMIN — TRANEXAMIC ACID 1000 MG: 100 INJECTION, SOLUTION INTRAVENOUS at 14:43

## 2024-04-10 RX ADMIN — Medication 10 MG: at 16:56

## 2024-04-10 RX ADMIN — DEXAMETHASONE SODIUM PHOSPHATE 10 MG: 10 INJECTION INTRAMUSCULAR; INTRAVENOUS at 14:43

## 2024-04-10 RX ADMIN — PROPOFOL 20 MG: 10 INJECTION, EMULSION INTRAVENOUS at 15:13

## 2024-04-10 ASSESSMENT — PAIN DESCRIPTION - ONSET
ONSET: GRADUAL
ONSET: GRADUAL

## 2024-04-10 ASSESSMENT — PAIN DESCRIPTION - DESCRIPTORS
DESCRIPTORS: ACHING;SORE

## 2024-04-10 ASSESSMENT — PAIN DESCRIPTION - LOCATION
LOCATION: NECK

## 2024-04-10 ASSESSMENT — PAIN DESCRIPTION - FREQUENCY
FREQUENCY: CONTINUOUS
FREQUENCY: CONTINUOUS

## 2024-04-10 ASSESSMENT — PAIN DESCRIPTION - ORIENTATION
ORIENTATION: POSTERIOR;MID
ORIENTATION: MID;POSTERIOR
ORIENTATION: POSTERIOR;MID

## 2024-04-10 ASSESSMENT — PAIN - FUNCTIONAL ASSESSMENT
PAIN_FUNCTIONAL_ASSESSMENT: PREVENTS OR INTERFERES SOME ACTIVE ACTIVITIES AND ADLS
PAIN_FUNCTIONAL_ASSESSMENT: 0-10

## 2024-04-10 ASSESSMENT — PAIN DESCRIPTION - PAIN TYPE
TYPE: SURGICAL PAIN
TYPE: SURGICAL PAIN

## 2024-04-10 ASSESSMENT — PAIN SCALES - GENERAL
PAINLEVEL_OUTOF10: 8
PAINLEVEL_OUTOF10: 7
PAINLEVEL_OUTOF10: 8

## 2024-04-10 NOTE — ANESTHESIA POSTPROCEDURE EVALUATION
Department of Anesthesiology  Postprocedure Note    Patient: Tameka Johnson  MRN: [id number]  YOB: 1960  Date of evaluation: 4/10/2024    Procedure Summary       Date: 04/10/24 Room / Location: [facility] MAIN OR  / [facility] MAIN OR    Anesthesia Start: 1429 Anesthesia Stop: 1709    Procedure: ERAS-C3-C7 laminectomy and C3-T1 posterior fusion with allograft, and lateral mass instrumentation. (Spine Cervical) Diagnosis:       Other kyphosis of cervical region      Cervical spinal stenosis      Cervical radiculopathy      Cervical disc herniation      (Other kyphosis of cervical region [M40.292])      (Cervical spinal stenosis [M48.02])      (Cervical radiculopathy [M54.12])      (Cervical disc herniation [M50.20])    Providers: Johnathan Lowe MD Responsible Provider: Yohan Cordon MD    Anesthesia Type: General ASA Status: 2            Anesthesia Type: General    Tanya Phase I: Tanya Score: 7    Tanya Phase II:      Anesthesia Post Evaluation    Patient location during evaluation: PACU  Patient participation: complete - patient participated  Level of consciousness: awake  Airway patency: patent  Nausea & Vomiting: no nausea  Cardiovascular status: hemodynamically stable  Respiratory status: acceptable and nonlabored ventilation  Hydration status: stable  Multimodal analgesia pain management approach    No notable events documented.

## 2024-04-10 NOTE — ANESTHESIA PRE PROCEDURE
Department of Anesthesiology  Preprocedure Note       Name:  Tameka Johnson   Age:  63 y.o.  :  1960                                          MRN:  [id number]         Date:  4/10/2024      Surgeon: Surgeon(s):  Johnathan Lowe MD    Procedure: Procedure(s):  ERAS-C3-C7 laminectomy and C3-T1 posterior fusion with allograft, and lateral mass instrumentation.    Medications prior to admission:   Prior to Admission medications    Medication Sig Start Date End Date Taking? Authorizing Provider   buPROPion (WELLBUTRIN SR) 150 MG extended release tablet Take 1 tablet by mouth daily 3/21/24   Amanda Blackman MD   ALBUTEROL IN Inhale into the lungs as needed    Amanda Blackman MD   levothyroxine (SYNTHROID) 137 MCG tablet Take 1 tablet by mouth Daily 3/14/24   Bernardo Britt MD   estrogens, conjugated,-methylTESTOSTERone (ESTRATEST HS) 0.625-1.25 MG per tablet Take 1 tablet by mouth at bedtime.    Amanda Blackman MD   methocarbamol (ROBAXIN) 750 MG tablet Take 1 tablet by mouth as needed    Amanda Blackman MD   Cholecalciferol (VITAMIN D) 50 MCG (2000 UT) CAPS capsule Take 2,000 Units by mouth daily 24   Bernardo Britt MD   esomeprazole (NEXIUM) 40 MG delayed release capsule Take 1 capsule by mouth every morning (before breakfast) 24  Amanda Blackman MD   famotidine (PEPCID) 20 MG tablet Take 1 tablet by mouth nightly 24  Amanda Blackman MD   medroxyPROGESTERone (PROVERA) 2.5 MG tablet Take 1 tablet by mouth at bedtime 24   Amanda Blackman MD   celecoxib (CELEBREX) 200 MG capsule Take 1 capsule by mouth 2 times daily as needed for Pain  Patient taking differently: Take 1 capsule by mouth daily 10/28/22   Todd Alas APRN - CNP   fluticasone (FLONASE) 50 MCG/ACT nasal spray 1 spray by Nasal route as needed for Allergies 4/30/15   Automatic Reconciliation, Ar       Current medications:    Current

## 2024-04-10 NOTE — OP NOTE
[facility] [id number]   [phone]    OPERATIVE REPORT    Patient ID:Tameka Johnson  [id number]  1960  63 y.o.    DATE OF SURGERY: 4/10/2024    SURGEON: Johnathan Tyson MD    PREOPERATIVE DIAGNOSIS: Multilevel cervical stenosis with radiculomyelopathy.    POSTOPERATIVE DIAGNOSIS: Multilevel cervical stenosis with radiculomyelopathy.    PROCEDURES:  1. Cervical laminectomy and bilateral foraminotomies C3-C7 (CPT 12211, 63048 X 4).  2. Posterior cervical arthrodesis C3-C4, C4-C5, C5-C6, C6-C7, C7-T1. (CPT 27089, 22614 X 4)  3. Lateral mass instrumentation C3-T1 (CPT 42875)   4. Allograft (CPT 85254)    ANESTHESIA: General.    ESTIMATED BLOOD LOSS:  200 ml    INTRAOPERATIVE COMPLICATIONS: None.    INTRAOPERATIVE NEUROMONITORING: There were no significant sustained changes in somatosensory-evoked potential monitoring.    POSTOPERATIVE CONDITION: Stable.    IMPLANTS:   Implant Name Type Inv. Item Serial No.  Lot No. LRB No. Used Action   GRAFT BNE LG - [id number]  GRAFT BNE LG [id number] BIOLOGICA  N/A 1 Implanted   PUTTY BIO DBM 5ML W CHIPS - [id number]  PUTTY BIO DBM 5ML W CHIPS  [employer] [facility] [id number] N/A 1 Implanted   PUTTY BIO DBM 5ML W CHIPS - [id number]  PUTTY BIO DBM 5ML W CHIPS  [employer] [facility] [id number] N/A 1 Implanted   ALLOGRAFT BNE CHIP 1-4 MM 15 CC CRUSH CAN - [id number]  ALLOGRAFT BNE CHIP 1-4 MM 15 CC CRUSH CANC [id number] [employer] [facility]  N/A 1 Implanted   BLOCKER SPNL OCCIPITOCERVICOTHORACIC PEDCL TI FOR OASYS SYS - [id number]  BLOCKER SPNL OCCIPITOCERVICOTHORACIC PEDCL TI FOR OASYS SYS  [employer] SP[employer] HOW- [id number] N/A 10 Implanted   SCREW SPNL L16MM DIA3.5MM ANODIZED SERAFIN POST PEDCL - [id number]  SCREW SPNL L16MM DIA3.5MM ANODIZED SERAFIN POST PEDCL  [place] SPINE HOWM-WD [id number] N/A 5 Implanted   SCREW SPNL L14MM DIA3.5MM ANODIZED SERAFIN POST PEDCL - [id number]  SCREW SPNL

## 2024-04-10 NOTE — PERIOP NOTE
Lidocaine drip pump settings confirmed at Ideal body weight: 45.5 kg (100 lb 4.9 oz) .  Infusing at 11.4 ml/hour.

## 2024-04-10 NOTE — PROGRESS NOTES
TRANSFER - IN REPORT:    Verbal report received from SONNY Webb on Tameka Johnson  being received from PACU for routine progression of patient care      Report consisted of patient's Situation, Background, Assessment and   Recommendations(SBAR).     Information from the following report(s) Nurse Handoff Report, Index, Adult Overview, MAR, Recent Results, Cardiac Rhythm NORMAL SINUS, Quality Measures, and Neuro Assessment was reviewed with the receiving nurse.    Opportunity for questions and clarification was provided.      Assessment to be completed upon patient's arrival to unit and care assumed.

## 2024-04-10 NOTE — DISCHARGE INSTRUCTIONS
Discharge Instructions - Spine Surgery    Wound Care and Showering  Your wound will typically be covered with a clear mesh and glue, which is waterproof sufficient for showering but not soaking in a bath. If there is some drainage or bleeding from under the glue, the area should be covered with gauze and secured with tape or Tegaderm (purchased at a pharmacy) until the drainage stops. Tegaderm is preferable since it is waterproof and can be worn in the shower.  Once the drainage stops, the outer gauze and Tegaderm dressing can be removed, while leaving the glue layer in place for 10-14 days.  Hair washing is permissible while in the shower. No tub baths, hot tubs or whirlpools until seen in the office. Once the wound is healed, the glue can be removed by dissolving with a triple-antibiotic or we can remove it at your first post operative visit.        If any of the following should occur, please call the office:  Fevers greater than 101 degrees that does not improve after tylenol use.  Increased redness or large amount of swelling around incision    Exercise  Walking and stair climbing privileges are unlimited including walking on a treadmill without an incline.  Do NOT lift  greater than 15 lbs until otherwise instructed. Avoid lifting or reaching above your head.    Sleeping  You may sleep in any comfortable position. Many patients find comfort sleeping in a recliner chair. It is normal to have difficulty sleeping for the first several weeks following your surgery. We recommend trying Benadryl, Melatonin, or Tylenol PM for help sleeping. All are over-the-counter and can be found in drugstores.    Eating  Because of the tubes in your throat while asleep during surgery, it is normal to have a sore throat and some difficulty swallowing solid foods after your surgery. This may persist for several weeks. Eating soft foods like yogurt, macaroni and mashed potatoes seem to help.    Pain  If you feel you need pain  instructed by your physician, during this time.    After general anesthesia, for 24 hours or while taking prescription Narcotics  Limit your activities  A responsible adult needs to be with you for the next 24 hours  Do not drive and operate hazardous machinery  Do not make important personal or business decisions  Do not drink alcoholic beverages  If you have not urinated within 8 hours after discharge, and you are experiencing discomfort from urinary retention, please go to the nearest ED.  If you have sleep apnea and have a CPAP machine, please use it for all naps and sleeping.  Please use caution when taking narcotics and any of your home medications that may cause drowsiness.    Anticoagulant Medications  Anticoagulant medications may be resumed on the third day (or 72 hours) after spinal surgery.      IF YOU HAVE QUESTIONS ONCE YOU ARE AT HOME CALL THE FOLLOWING NUMBERS:   Main office number: [phone]    For emergency call [phone]

## 2024-04-10 NOTE — H&P
flattening of the right side of the anterior cord and continued severe right foraminal stenosis.  Also, she has anterolisthesis and a disc herniation at C3-C4 with severe right-sided foraminal stenosis.  Similarly, there is moderate right-sided foraminal stenosis at C4-C5.  There is a small central disc herniation at C7-T1.     Diagnosis:         ICD-10-CM     1. Cervical radiculopathy  M54.12         2. Other kyphosis, cervical region  M40.292         3. Foraminal stenosis of cervical region  M48.02               Assessment/Plan:       This patient's clinical history and physical exam is consistent with recurrent cervical radiculopathy mostly affecting the C5 and C6 nerve roots.  She also has cervical kyphosis at the C7-T1 level in combination with a central disc herniation and anterolisthesis here..   The imaging studies are concordant with the patient's symptoms. Conservative efforts have been reasonably exhausted and the patient feels like they cannot go on with the symptoms as they are. We have discussed surgical options as follows.  Her surgery would need to be done posteriorly because some of the stenosis is at the previous ACDF levels.  Also, with the kyphosis and disc herniation at C7-T1 the fusion should be extended into the thoracic spine.      The patient would be placed in the prone position and a midline incision would be made over the back of the neck.  Dissection would be carried down to expose the spine and an X-ray would be obtained to identify the appropriate level.  The affected vertebrae would then be fused together with bone graft taken from a cadaver.  Screws and rods would be placed to supplement the fusion.  Once the hardware is in place, the bone covering the spinal cord would be removed with a bryn. The wound would then be closed over a drain and sterile dressings applied.  The patient would expect to stay in the hospital 1-2 days depending on how quickly he recovers.  Follow-up would be  scheduled for 2-3 weeks and he would have restrictions including no driving for 4 weeks, no lifting greater than 10 lbs.  We also discussed the potential risks of the surgery including, but not limited to infection, spinal fluid leak, compressive hematoma; injury to spinal cord or peripheral nerve root resulting in paralysis, or loss of use of an extremity; persistent neck or arm symptoms or pain at the bone graft site; failure of the bone graft to heal or failure of the hardware resulting in the possibility of needing additional surgery; postoperative hoarseness or dysphagia; injury to an artery or vein resulting in significant blood loss; and the risks of anesthesia including, but not limited heart attack, stroke, blood clot or death.  The patient voiced an understanding of these issues as outlined.  The procedure that I would recommend here is a C3-C7 laminectomy and C3-T1 posterior fusion with allograft, and lateral mass instrumentation.

## 2024-04-11 PROCEDURE — 97161 PT EVAL LOW COMPLEX 20 MIN: CPT

## 2024-04-11 PROCEDURE — 6360000002 HC RX W HCPCS: Performed by: ORTHOPAEDIC SURGERY

## 2024-04-11 PROCEDURE — 6370000000 HC RX 637 (ALT 250 FOR IP): Performed by: ORTHOPAEDIC SURGERY

## 2024-04-11 PROCEDURE — 1100000000 HC RM PRIVATE

## 2024-04-11 PROCEDURE — 97535 SELF CARE MNGMENT TRAINING: CPT

## 2024-04-11 PROCEDURE — 2500000003 HC RX 250 WO HCPCS: Performed by: ORTHOPAEDIC SURGERY

## 2024-04-11 PROCEDURE — 97165 OT EVAL LOW COMPLEX 30 MIN: CPT

## 2024-04-11 PROCEDURE — 2580000003 HC RX 258: Performed by: ORTHOPAEDIC SURGERY

## 2024-04-11 PROCEDURE — 97530 THERAPEUTIC ACTIVITIES: CPT

## 2024-04-11 RX ADMIN — ACETAMINOPHEN 650 MG: 325 TABLET ORAL at 04:19

## 2024-04-11 RX ADMIN — METHOCARBAMOL TABLETS 750 MG: 750 TABLET, COATED ORAL at 12:34

## 2024-04-11 RX ADMIN — BUPROPION HYDROCHLORIDE 300 MG: 300 TABLET, EXTENDED RELEASE ORAL at 08:40

## 2024-04-11 RX ADMIN — PROMETHAZINE HYDROCHLORIDE 12.5 MG: 12.5 TABLET ORAL at 12:34

## 2024-04-11 RX ADMIN — BISACODYL 5 MG: 5 TABLET, COATED ORAL at 08:40

## 2024-04-11 RX ADMIN — SODIUM CHLORIDE, PRESERVATIVE FREE 10 ML: 5 INJECTION INTRAVENOUS at 08:42

## 2024-04-11 RX ADMIN — KETOROLAC TROMETHAMINE 30 MG: 30 INJECTION, SOLUTION INTRAMUSCULAR at 04:16

## 2024-04-11 RX ADMIN — SODIUM CHLORIDE, PRESERVATIVE FREE 5 ML: 5 INJECTION INTRAVENOUS at 21:12

## 2024-04-11 RX ADMIN — ACETAMINOPHEN 650 MG: 325 TABLET ORAL at 21:10

## 2024-04-11 RX ADMIN — LEVOTHYROXINE SODIUM 137 MCG: 0.05 TABLET ORAL at 06:20

## 2024-04-11 RX ADMIN — HYDROMORPHONE HYDROCHLORIDE 0.5 MG: 1 INJECTION, SOLUTION INTRAMUSCULAR; INTRAVENOUS; SUBCUTANEOUS at 04:18

## 2024-04-11 RX ADMIN — MEDROXYPROGESTERONE ACETATE 2.5 MG: 2.5 TABLET ORAL at 22:40

## 2024-04-11 RX ADMIN — FAMOTIDINE 20 MG: 20 TABLET, FILM COATED ORAL at 21:11

## 2024-04-11 RX ADMIN — ACETAMINOPHEN 650 MG: 325 TABLET ORAL at 14:30

## 2024-04-11 RX ADMIN — METHOCARBAMOL TABLETS 750 MG: 750 TABLET, COATED ORAL at 04:19

## 2024-04-11 RX ADMIN — SODIUM CHLORIDE: 9 INJECTION, SOLUTION INTRAVENOUS at 04:24

## 2024-04-11 RX ADMIN — CEFAZOLIN SODIUM 2000 MG: 100 INJECTION, POWDER, LYOPHILIZED, FOR SOLUTION INTRAVENOUS at 06:21

## 2024-04-11 RX ADMIN — ACETAMINOPHEN 650 MG: 325 TABLET ORAL at 08:40

## 2024-04-11 RX ADMIN — POLYETHYLENE GLYCOL 3350 17 G: 17 POWDER, FOR SOLUTION ORAL at 08:41

## 2024-04-11 RX ADMIN — ONDANSETRON 4 MG: 2 INJECTION INTRAMUSCULAR; INTRAVENOUS at 04:15

## 2024-04-11 ASSESSMENT — PAIN DESCRIPTION - LOCATION
LOCATION: BACK;NECK
LOCATION: NECK
LOCATION: BACK

## 2024-04-11 ASSESSMENT — PAIN DESCRIPTION - FREQUENCY: FREQUENCY: CONTINUOUS

## 2024-04-11 ASSESSMENT — PAIN SCALES - GENERAL
PAINLEVEL_OUTOF10: 5
PAINLEVEL_OUTOF10: 6
PAINLEVEL_OUTOF10: 0
PAINLEVEL_OUTOF10: 4
PAINLEVEL_OUTOF10: 7
PAINLEVEL_OUTOF10: 6
PAINLEVEL_OUTOF10: 7
PAINLEVEL_OUTOF10: 7
PAINLEVEL_OUTOF10: 9

## 2024-04-11 ASSESSMENT — PAIN DESCRIPTION - PAIN TYPE: TYPE: SURGICAL PAIN

## 2024-04-11 ASSESSMENT — PAIN SCALES - WONG BAKER: WONGBAKER_NUMERICALRESPONSE: NO HURT

## 2024-04-11 ASSESSMENT — PAIN DESCRIPTION - ORIENTATION
ORIENTATION: MID;LOWER
ORIENTATION: POSTERIOR;MID

## 2024-04-11 ASSESSMENT — PAIN DESCRIPTION - DESCRIPTORS
DESCRIPTORS: DULL
DESCRIPTORS: ACHING;SORE
DESCRIPTORS: DISCOMFORT;ACHING

## 2024-04-11 ASSESSMENT — PAIN - FUNCTIONAL ASSESSMENT: PAIN_FUNCTIONAL_ASSESSMENT: PREVENTS OR INTERFERES SOME ACTIVE ACTIVITIES AND ADLS

## 2024-04-11 ASSESSMENT — PAIN DESCRIPTION - ONSET: ONSET: GRADUAL

## 2024-04-11 NOTE — CARE COORDINATION
CM screened chart for potential discharge needs.  No CM consult received.  Patient admitted for elective surgery with Dr. Tyson.  Therapy recommending  PT and RW at discharge.  Attending provider's office has previously sent referral to [facility] and information was added to chart.  CM will plan to confirm with patient if RW is needed or if she already has one available.  Patient is insured with [employer] and CM will need to confirm DME agency that can accept her insurance.         04/11/24 3217   Service Assessment   Patient Orientation Alert and Oriented   Cognition Alert   History Provided By Medical Record   Primary Caregiver Self   Accompanied By/Relationship Spouse   Support Systems Spouse/Significant Other   Patient's Healthcare Decision Maker is: Legal Next of Kin   PCP Verified by CM Yes   Last Visit to PCP Within last 3 months   Prior Functional Level Independent in ADLs/IADLs   Current Functional Level Assistance with the following:;Mobility;Shopping;Housework;Cooking   Can patient return to prior living arrangement Yes   Ability to make needs known: Good   Family able to assist with home care needs: Yes   Would you like for me to discuss the discharge plan with any other family members/significant others, and if so, who? No   Financial Resources  ([facility])   Community Resources None   CM/ Referral Other (see comment)  (No CM consult)   Social/Functional History   Lives With Spouse   Type of Home House   Home Layout Two level;Able to Live on Main level with bedroom/bathroom   Home Access Stairs to enter without rails   Entrance Stairs - Number of Steps 3   Home Equipment None   Discharge Planning   Potential Assistance Needed Durable Medical Equipment;Home Care   Potential DME Needed Walker   Patient expects to be discharged to: House

## 2024-04-11 NOTE — PROGRESS NOTES
ACUTE PHYSICAL THERAPY GOALS:   (Developed with and agreed upon by patient and/or caregiver.)    (1.) Tameka Johnson  will move from supine to sit and sit to supine  and roll side to side with INDEPENDENT within 7 treatment day(s).    (2.) Tameka Johnson will transfer from bed to chair and chair to bed with MODIFIED INDEPENDENCE using the least restrictive device within 7 treatment day(s).    (3.) Tameka Johnson will ambulate with MODIFIED INDEPENDENCE for 150 feet with the least restrictive device within 7 treatment day(s).   (4.) Tameka Johnson will perform standing static and dynamic balance activities x 10 minutes with MODIFIED INDEPENDENCE to improve safety within 7 treatment day(s).  (5.) Tameka Johnson will ascend and descend 3 stairs using no hand rail(s) with MODIFIED INDEPENDENCE to improve functional mobility and safety within 7 treatment day(s).  (6.) Tameka Johnson will perform therapeutic exercises x 10 min for HEP with MODIFIED INDEPENDENCE to improve strength, endurance, and functional mobility within 7 treatment day(s). '    PHYSICAL THERAPY Initial Assessment, Daily Note, and AM  (Link to Caseload Tracking: PT Visit Days : 1  Acknowledge Orders  Time In/Out  PT Charge Capture  Rehab Caseload Tracker    Tameka Johnson is a 63 y.o. female   PRIMARY DIAGNOSIS: Cervical spinal stenosis  Other kyphosis of cervical region [M40.292]  Cervical spinal stenosis [M48.02]  Cervical radiculopathy [M54.12]  Cervical disc herniation [M50.20]  S/P cervical spinal fusion [Z98.1]  Procedure(s) (LRB):  ERAS-C3-C7 laminectomy and C3-T1 posterior fusion with allograft, and lateral mass instrumentation. (N/A)  1 Day Post-Op  Reason for Referral: Difficulty in walking, Not elsewhere classified (R26.2)  Inpatient: Payor:  [facility] / Plan: [facility] / Product Type: *No Product type* /     ASSESSMENT:     REHAB RECOMMENDATIONS:   Recommendation  [facility]    EDUCATION: Education Given To: Patient  Education Provided: Role of Therapy;Plan of Care;Precautions;Transfer Training (spinal precautions)  Education Method: Verbal  Barriers to Learning: None  Education Outcome: Verbalized understanding;Continued education needed    TIME IN/OUT:  Time In: 0843  Time Out: 0914  Minutes: 31    JODEE GOMEZ PT

## 2024-04-11 NOTE — PROGRESS NOTES
Physical Therapy Note:    Attempted to see patient this PM for physical therapy treatment  session. Patient declined due to nausea. RN aware. Will follow and re-attempt as schedule permits/patient available. Thank you,    JODEE GOMEZ, PT     Rehab Caseload Tracker

## 2024-04-11 NOTE — PROGRESS NOTES
ORTHO PROGRESS NOTE    2024    Admit Date: 4/10/2024  Post Op day: 1 Day Post-Op      Subjective:     Tameka Johnson is a patient who is now 1 Day Post-Op  and has complaints nausea .       Objective:     PT/OT:    Progressing     Vital Signs:    Patient Vitals for the past 8 hrs:   BP Temp Temp src Pulse Resp SpO2   24 0737 (!) 153/81 97.5 °F (36.4 °C) Axillary 78 20 99 %     Temp (24hrs), Av.5 °F (36.4 °C), Min:97.3 °F (36.3 °C), Max:98 °F (36.7 °C)      LAB:    No results for input(s): \"HGB\", \"WBC\", \"PLT\" in the last 72 hours.    Physical Exam:    Awake and in no acute distress.  Mood and affect appropriate.  Respirations unlabored and no evidence cyanosis.  Calves nontender.  Abdomen soft and nontender.  Dressing clean/dry  No new neurologic deficit.    Assessment:      1 Day Post-Op STATUS POST Procedure(s):  ERAS-C3-C7 laminectomy and C3-T1 posterior fusion with allograft, and lateral mass instrumentation.      Plan:     -PT/OT  -likely home tomorrow      Anticipate discharge to: HOME      Signed By: Bernardo Razo MD

## 2024-04-11 NOTE — PROGRESS NOTES
4 Eyes Skin Assessment     NAME:  Tameka Johnson  YOB: 1960  MEDICAL RECORD NUMBER:  [id number]    The patient is being assessed for  Admission    I agree that at least one RN has performed a thorough Head to Toe Skin Assessment on the patient. ALL assessment sites listed below have been assessed.      Areas assessed by both nurses:    Sacrum. Buttock, Coccyx, Ischium        Does the Patient have a Wound? No noted wound(s)       Liam Prevention initiated by RN: Yes  Wound Care Orders initiated by RN: No    Pressure Injury (Stage 3,4, Unstageable, DTI, NWPT, and Complex wounds) if present, place Wound referral order by RN under : No    New Ostomies, if present place, Ostomy referral order under : No     Nurse 1 eSignature: Electronically signed by DINA JORGE RN on 4/11/24 at 5:14 AM EDT    **SHARE this note so that the co-signing nurse can place an eSignature**    Nurse 2 eSignature: Electronically signed by VELASQUEZ PARMAR RN on 4/11/24 at 5:14 AM EDT

## 2024-04-11 NOTE — PROGRESS NOTES
ACUTE OCCUPATIONAL THERAPY GOALS:   (Developed with and agreed upon by patient and/or caregiver.)  1. Patient will verbalize and demonstrate understanding of spinal precautions with 100% accuracy during ADLs.   2. Patient will complete lower body bathing and dressing with modified independence and adaptive equipment as needed.   3. Patient will complete functional transfers with modified independence and adaptive equipment as needed.   4. Patient will complete toileting and toilet transfer with modified independence.   5. Patient will complete functional mobility of household distances with modified independence and adaptive equipment as needed.   6. Patient will demonstrate ability to log roll in bed with modified independence and no verbal cues from therapist.   7. Patient will complete grooming with modified independence and adaptive equipment as needed.     Timeframe: 7 visits       OCCUPATIONAL THERAPY Initial Assessment and Daily Note       OT Visit Days: 1  Acknowledge Orders  Time  OT Charge Capture  Rehab Caseload Tracker      Tameka Johnson is a 63 y.o. female   PRIMARY DIAGNOSIS: Cervical spinal stenosis  Other kyphosis of cervical region [M40.292]  Cervical spinal stenosis [M48.02]  Cervical radiculopathy [M54.12]  Cervical disc herniation [M50.20]  S/P cervical spinal fusion [Z98.1]  Procedure(s) (LRB):  ERAS-C3-C7 laminectomy and C3-T1 posterior fusion with allograft, and lateral mass instrumentation. (N/A)  1 Day Post-Op  Reason for Referral: Cervicalgia (M54.2)  Inpatient: Payor: [facility] / Plan:  EAST / Product Type: *No Product type* /     ASSESSMENT:     REHAB RECOMMENDATIONS:   Recommendation to date pending progress:  Setting:  No further skilled occupational therapy after discharge from hospital    Equipment:    None     ASSESSMENT:  Ms. Johnson is a pleasant 62 y/o F who is POD #1 from above procedure. Baseline independent, lives with supportive spouse. Reports pain

## 2024-04-12 VITALS
BODY MASS INDEX: 34.75 KG/M2 | RESPIRATION RATE: 18 BRPM | OXYGEN SATURATION: 99 % | HEIGHT: 60 IN | HEART RATE: 91 BPM | WEIGHT: 177 LBS | TEMPERATURE: 99.7 F | SYSTOLIC BLOOD PRESSURE: 127 MMHG | DIASTOLIC BLOOD PRESSURE: 68 MMHG

## 2024-04-12 PROCEDURE — 97530 THERAPEUTIC ACTIVITIES: CPT

## 2024-04-12 PROCEDURE — 2580000003 HC RX 258: Performed by: ORTHOPAEDIC SURGERY

## 2024-04-12 PROCEDURE — 6370000000 HC RX 637 (ALT 250 FOR IP): Performed by: ORTHOPAEDIC SURGERY

## 2024-04-12 RX ADMIN — OXYCODONE HYDROCHLORIDE 5 MG: 5 TABLET ORAL at 03:39

## 2024-04-12 RX ADMIN — SODIUM CHLORIDE, PRESERVATIVE FREE 10 ML: 5 INJECTION INTRAVENOUS at 08:34

## 2024-04-12 RX ADMIN — ACETAMINOPHEN 650 MG: 325 TABLET ORAL at 14:10

## 2024-04-12 RX ADMIN — OXYCODONE HYDROCHLORIDE 5 MG: 5 TABLET ORAL at 14:10

## 2024-04-12 RX ADMIN — ACETAMINOPHEN 650 MG: 325 TABLET ORAL at 03:36

## 2024-04-12 RX ADMIN — ACETAMINOPHEN 650 MG: 325 TABLET ORAL at 08:29

## 2024-04-12 RX ADMIN — LEVOTHYROXINE SODIUM 137 MCG: 0.05 TABLET ORAL at 06:18

## 2024-04-12 RX ADMIN — BISACODYL 5 MG: 5 TABLET, COATED ORAL at 08:29

## 2024-04-12 RX ADMIN — POLYETHYLENE GLYCOL 3350 17 G: 17 POWDER, FOR SOLUTION ORAL at 08:29

## 2024-04-12 RX ADMIN — BUPROPION HYDROCHLORIDE 300 MG: 300 TABLET, EXTENDED RELEASE ORAL at 08:28

## 2024-04-12 RX ADMIN — OXYCODONE 10 MG: 5 TABLET ORAL at 08:28

## 2024-04-12 ASSESSMENT — PAIN DESCRIPTION - FREQUENCY
FREQUENCY: INTERMITTENT
FREQUENCY: INTERMITTENT

## 2024-04-12 ASSESSMENT — PAIN DESCRIPTION - ONSET
ONSET: GRADUAL
ONSET: GRADUAL

## 2024-04-12 ASSESSMENT — PAIN DESCRIPTION - PAIN TYPE
TYPE: SURGICAL PAIN
TYPE: SURGICAL PAIN

## 2024-04-12 ASSESSMENT — PAIN DESCRIPTION - ORIENTATION
ORIENTATION: LOWER;POSTERIOR;MID
ORIENTATION: POSTERIOR

## 2024-04-12 ASSESSMENT — PAIN - FUNCTIONAL ASSESSMENT: PAIN_FUNCTIONAL_ASSESSMENT: PREVENTS OR INTERFERES SOME ACTIVE ACTIVITIES AND ADLS

## 2024-04-12 ASSESSMENT — PAIN DESCRIPTION - DESCRIPTORS
DESCRIPTORS: DISCOMFORT;ACHING;SORE
DESCRIPTORS: DISCOMFORT;NAGGING

## 2024-04-12 ASSESSMENT — PAIN DESCRIPTION - LOCATION
LOCATION: SHOULDER;NECK;BACK
LOCATION: NECK

## 2024-04-12 ASSESSMENT — PAIN SCALES - GENERAL
PAINLEVEL_OUTOF10: 5
PAINLEVEL_OUTOF10: 7
PAINLEVEL_OUTOF10: 0

## 2024-04-12 NOTE — DISCHARGE SUMMARY
Discharge Summary    Patient ID:Tameka Johnson  [id number]  1960  63 y.o.    Admit date: 4/10/2024    Discharge date:4/12/2024    Admission Diagnoses: Other kyphosis of cervical region [M40.292]  Cervical spinal stenosis [M48.02]  Cervical radiculopathy [M54.12]  Cervical disc herniation [M50.20]  S/P cervical spinal fusion [Z98.1]      Surgeon: Johnathan Lowe, *    Procedure:     1. Cervical laminectomy and bilateral foraminotomies C3-C7 (CPT 56964, 63048 X 4).  2. Posterior cervical arthrodesis C3-C4, C4-C5, C5-C6, C6-C7, C7-T1. (CPT 92733, 22614 X 4)  3. Lateral mass instrumentation C3-T1 (CPT 65460)   4. Allograft (CPT 70270)       Post Op complications: none    Hospital Course: Patient admitted to ortho floor. Antibiotics were given postop. SCD and fish hose were in place for DVT prophylaxis. Patient voided normally. Patient did not receive blood transfusion. Drain output diminished and was removed.  Patient tolerated pain medications and po diet. The dressing remained clean, dry, and intact.  Physical Therapy started on the day following surgery and progressed to ambulation without assistance.  At the time of discharge, had understanding of precautions needed following surgery.      Postoperative complications requiring an extended length of stay: None    Discharged to: Home    New Medications:      Medication List        START taking these medications      oxyCODONE 5 MG immediate release tablet  Commonly known as: Roxicodone  Take 1 tablet by mouth every 6 hours as needed for Pain for up to 7 days. Intended supply: 5 days. Take lowest dose possible to manage pain Max Daily Amount: 20 mg            CONTINUE taking these medications      ALBUTEROL IN     buPROPion 150 MG extended release tablet  Commonly known as: WELLBUTRIN SR     celecoxib 200 MG capsule  Commonly known as: CeleBREX  Take 1 capsule by mouth 2 times daily as needed for Pain     esomeprazole 40 MG delayed release

## 2024-04-12 NOTE — PROGRESS NOTES
ORTHOPAEDIC PROGRESS NOTE    2024  Admit Date: 4/10/2024  Admit Diagnosis: Other kyphosis of cervical region [M40.292]  Cervical spinal stenosis [M48.02]  Cervical radiculopathy [M54.12]  Cervical disc herniation [M50.20]  S/P cervical spinal fusion [Z98.1]  Procedure: Procedure(s):  ERAS-C3-C7 laminectomy and C3-T1 posterior fusion with allograft, and lateral mass instrumentation.  Post Op day: 2 Days Post-Op      Subjective:     Tameka Johnson is a patient who has complaints of pain, N/V has stopped .       Objective:     Vital Signs:    Blood pressure 127/68, pulse 91, temperature 99.7 °F (37.6 °C), temperature source Oral, resp. rate 18, height 1.524 m (5'), weight 80.3 kg (177 lb), SpO2 99 %.    Temp (24hrs), Av °F (37.2 °C), Min:98.2 °F (36.8 °C), Max:99.7 °F (37.6 °C)      No intake/output data recorded.  04/10 1901 -  0700  In: 555 [P.O.:555]  Out: 1190 [Urine:975; Drains:215]    LAB:    No results for input(s): \"HGB\", \"WBC\", \"PLT\" in the last 72 hours.    Physical Exam    General:   Alert and oriented. No acute distress  Lungs:  Respirations unlabored.  Extremities: No evidence of cyanosis. Calves soft, nontender.    Moves both upper and lower extremities.   Dressing:  clean, dry, and intact  Neuro:  no deficit      Assessment:      Patient Active Problem List   Diagnosis    Total knee replacement status, left    Pure hypercholesterolemia    Acute bronchitis    Esophageal reflux    Cervical spinal stenosis    Stenosis of cervical spine    Hypersomnia due to medical condition    Irregular menstrual cycle    Chest pain    Persistent disorder of initiating or maintaining sleep    Synovial cyst of popliteal space    Osteoarthritis of spine    Arthritis of knee, left    Snoring    Palpitations    Mixed hyperlipidemia    Rosacea    Primary hypothyroidism    Vitamin D deficiency    Kyphosis    Cervical radiculopathy    Cervical disc herniation    S/P cervical spinal fusion       Plan:      Continue PT/OT  Discontinue: daily dressing change and drain    Consult: none    Anticipate Discharge To: HOME later today if does well with PT and tolerates oral pain meds        Signed By: MARILIN Ren - ALFRED

## 2024-04-12 NOTE — PROGRESS NOTES
ACUTE PHYSICAL THERAPY GOALS:   (Developed with and agreed upon by patient and/or caregiver.)     (1.) Tameka Johnson  will move from supine to sit and sit to supine  and roll side to side with INDEPENDENT within 7 treatment day(s).    (2.) Tameka Johnson will transfer from bed to chair and chair to bed with MODIFIED INDEPENDENCE using the least restrictive device within 7 treatment day(s).    (3.) Tameka Johnson will ambulate with MODIFIED INDEPENDENCE for 150 feet with the least restrictive device within 7 treatment day(s).   (4.) Tameka Johnson will perform standing static and dynamic balance activities x 10 minutes with MODIFIED INDEPENDENCE to improve safety within 7 treatment day(s).  (5.) Tameka Johnson will ascend and descend 3 stairs using no hand rail(s) with MODIFIED INDEPENDENCE to improve functional mobility and safety within 7 treatment day(s).  (6.) Tameka Johnson will perform therapeutic exercises x 10 min for HEP with MODIFIED INDEPENDENCE to improve strength, endurance, and functional mobility within 7 treatment day(s).     PHYSICAL THERAPY: Daily Note AM   (Link to Caseload Tracking: PT Visit Days : 2  Time In/Out PT Charge Capture  Rehab Caseload Tracker  Orders    Tameka Johnson is a 63 y.o. female   PRIMARY DIAGNOSIS: Cervical spinal stenosis  Other kyphosis of cervical region [M40.292]  Cervical spinal stenosis [M48.02]  Cervical radiculopathy [M54.12]  Cervical disc herniation [M50.20]  S/P cervical spinal fusion [Z98.1]  Procedure(s) (LRB):  ERAS-C3-C7 laminectomy and C3-T1 posterior fusion with allograft, and lateral mass instrumentation. (N/A)  2 Days Post-Op  Inpatient: Payor:  EAST / Plan:  EAST / Product Type: *No Product type* /     ASSESSMENT:     REHAB RECOMMENDATIONS:   Recommendation to date pending progress:  Setting:  Home Health Therapy    Equipment:    To Be Determined     ASSESSMENT:  Ms.  Elizabeth was standing in room going to  on contact and agreeable to work with therapy. The PT session today focused on  transfers, amb and ex. Pt recalled 3/3 precautions.  Progress toward goals demonstrated by  increased amb distance   .     SUBJECTIVE:   Ms. Johnson states \"I won't go home and sit down\".     Social/Functional Lives With: Spouse  Type of Home: House  Home Layout: Two level, Able to Live on Main level with bedroom/bathroom  Home Access: Stairs to enter without rails  Entrance Stairs - Number of Steps: 3  Home Equipment: None  Ambulation Assistance: Independent  Transfer Assistance: Independent  Occupation: Retired  OBJECTIVE:     PAIN: VITALS / O2: PRECAUTION / LINES / DRAINS:   Pre Treatment: 7/10    Had medication prior to treatment         Post Treatment: Did not rate.  Vitals        Oxygen    Hemovac    RESTRICTIONS/PRECAUTIONS:  Restrictions/Precautions  Restrictions/Precautions: Fall Risk  Position Activity Restriction  Spinal Precautions: No Bending, No Lifting, No Twisting  Restrictions/Precautions: Fall Risk     MOBILITY:   I Mod I S SBA CGA Min Mod Max Total  NT x2 Comments:   Bed Mobility    Rolling [] [] [] [] [] [] [] [] [] [x] []    Supine to Sit [] [] [] [] [] [] [] [] [] [x] []    Scooting [] [] [] [] [] [] [] [] [] [x] []    Sit to Supine [] [] [] [] [] [] [] [] [] [x] []    Transfers    Sit to Stand [] [x] [] [] [] [] [] [] [] [] []    Bed to Chair [] [x] [] [] [] [] [] [] [] [] []    Stand to Sit [] [x] [] [] [] [] [] [] [] [] []     [] [] [] [] [] [] [] [] [] [] []    I=Independent, Mod I=Modified Independent, S=Supervision, SBA=Standby Assistance, CGA=Contact Guard Assistance,   Min=Minimal Assistance, Mod=Moderate Assistance, Max=Maximal Assistance, Total=Total Assistance, NT=Not Tested    BALANCE: Good Fair+ Fair Fair- Poor NT Comments   Sitting Static [x] [] [] [] [] []    Sitting Dynamic [x] [] [] [] [] []              Standing Static [] [x] [] [] [] []    Standing Dynamic

## 2024-04-15 NOTE — PROGRESS NOTES
Physician Progress Note      PATIENT:               KENDRICK POZO  CSN #:                  [id number]  :                       1960  ADMIT DATE:       4/10/2024 12:10 PM  DISCH DATE:        2024 2:13 PM  RESPONDING  PROVIDER #:        Johnathan Tyson MD          QUERY TEXT:    Pt admitted for cervical fusion. Pt noted to have radiculomyelopathy in Op   note. If possible, please document in progress notes and discharge summary if   patient has cervical myelopathy:    The medical record reflects the following:  Risk Factors: 63 year old female with cervical stenosis  Clinical Indicators: 04/10 H&P- The patient had done pretty well until about 6   months ago when she began having neck pain with radiation to the right   shoulder.  For the most part, it terminates around the deltoid and anterior   biceps area.  However, on occasion it does permeate further into the forearm   and wrist. 04/10 Op Note- Postoperative Diagnosis: Multilevel cervical   stenosis with radiculomyelopathy... The patient was felt to have evidence of   cervical myelopathy by history and physical exam. A cervical imaging study   confirmed the presence of multilevel stenosis... This patient's clinical   history and physical exam is consistent with recurrent cervical radiculopathy   mostly affecting the C5 and C6 nerve roots.  She also has cervical kyphosis at   the C7-T1 level in combination with a central disc herniation and   anterolisthesis here  Treatment: Cervical laminectomy and bilateral foraminotomies C3-C7, posterior   cervical arthrodesis C3-C4, C4-C5, C5-C6, C6-C7, C7-T1  Options provided:  -- Cervical myelopathy confirmed after study  -- Cervical myelopathy ruled out  -- Other - I will add my own diagnosis  -- Disagree - Not applicable / Not valid  -- Disagree - Clinically unable to determine / Unknown  -- Refer to Clinical Documentation Reviewer    PROVIDER RESPONSE TEXT:    This patient has cervical myelopathy which

## 2024-05-02 ENCOUNTER — OFFICE VISIT (OUTPATIENT)
Age: 64
End: 2024-05-02

## 2024-05-02 DIAGNOSIS — Z98.1 STATUS POST CERVICAL SPINAL ARTHRODESIS: Primary | ICD-10-CM

## 2024-05-02 PROCEDURE — 99024 POSTOP FOLLOW-UP VISIT: CPT | Performed by: ORTHOPAEDIC SURGERY

## 2024-05-02 RX ORDER — METHOCARBAMOL 750 MG/1
750 TABLET, FILM COATED ORAL EVERY 8 HOURS PRN
Qty: 30 TABLET | Refills: 1 | Status: SHIPPED | OUTPATIENT
Start: 2024-05-02

## 2024-05-02 NOTE — PROGRESS NOTES
Name: Tameka Johnson  YOB: 1960  Gender: female  MRN: [id number]  Age: 63 y.o.    Chief Complaint: Neck surgery follow up.    History of present illness:    Tameka Johnson is here for 3 week follow up of her  posterior cervical laminectomy and fusion surgery. She reports significant relief of preoperative upper extremity pain, weakness and parasthesias.  With the exception being right thumb and index finger tip numbness.  She also has some gait instability but is not using an assistive device.  She is working with therapy at home.  The wound is benign.     Medications:   Current Outpatient Medications   Medication Sig    buPROPion (WELLBUTRIN SR) 150 MG extended release tablet Take 1 tablet by mouth daily    ALBUTEROL IN Inhale into the lungs as needed    levothyroxine (SYNTHROID) 137 MCG tablet Take 1 tablet by mouth Daily    estrogens, conjugated,-methylTESTOSTERone (ESTRATEST HS) 0.625-1.25 MG per tablet Take 1 tablet by mouth at bedtime.    methocarbamol (ROBAXIN) 750 MG tablet Take 1 tablet by mouth as needed    Cholecalciferol (VITAMIN D) 50 MCG (2000 UT) CAPS capsule Take 2,000 Units by mouth daily    esomeprazole (NEXIUM) 40 MG delayed release capsule Take 1 capsule by mouth every morning (before breakfast)    famotidine (PEPCID) 20 MG tablet Take 1 tablet by mouth nightly    medroxyPROGESTERone (PROVERA) 2.5 MG tablet Take 1 tablet by mouth at bedtime    celecoxib (CELEBREX) 200 MG capsule Take 1 capsule by mouth 2 times daily as needed for Pain (Patient taking differently: Take 1 capsule by mouth daily)    fluticasone (FLONASE) 50 MCG/ACT nasal spray 1 spray by Nasal route as needed for Allergies     No current facility-administered medications for this visit.       Allergies:   Allergies   Allergen Reactions    Etodolac Other (See Comments)     Mood Swings.         Physical Exam:     Oriented to person, place and time.    Mood and affect are

## 2024-06-14 DIAGNOSIS — E03.9 PRIMARY HYPOTHYROIDISM: ICD-10-CM

## 2024-06-14 DIAGNOSIS — E55.9 VITAMIN D DEFICIENCY: ICD-10-CM

## 2024-06-27 ENCOUNTER — OFFICE VISIT (OUTPATIENT)
Age: 64
End: 2024-06-27

## 2024-06-27 DIAGNOSIS — Z98.1 STATUS POST CERVICAL SPINAL ARTHRODESIS: Primary | ICD-10-CM

## 2024-06-27 PROCEDURE — 99024 POSTOP FOLLOW-UP VISIT: CPT | Performed by: ORTHOPAEDIC SURGERY

## 2024-06-27 NOTE — PROGRESS NOTES
Name: Tameka Johnson  YOB: 1960  Gender: female  MRN: [id number]  Age: 63 y.o.    Chief Complaint: Neck surgery follow up.    History of present illness:    Tameka Johnson is here for 11 week follow up of her  posterior cervical laminectomy and fusion surgery.  Overall, she is doing well.  She would like to start doing some yoga and more household chores.     Medications:   Current Outpatient Medications   Medication Sig    methocarbamol (ROBAXIN) 750 MG tablet Take 1 tablet by mouth every 8 hours as needed (spasm)    buPROPion (WELLBUTRIN SR) 150 MG extended release tablet Take 1 tablet by mouth daily    ALBUTEROL IN Inhale into the lungs as needed    levothyroxine (SYNTHROID) 137 MCG tablet Take 1 tablet by mouth Daily    estrogens, conjugated,-methylTESTOSTERone (ESTRATEST HS) 0.625-1.25 MG per tablet Take 1 tablet by mouth at bedtime.    Cholecalciferol (VITAMIN D) 50 MCG (2000 UT) CAPS capsule Take 2,000 Units by mouth daily    esomeprazole (NEXIUM) 40 MG delayed release capsule Take 1 capsule by mouth every morning (before breakfast)    famotidine (PEPCID) 20 MG tablet Take 1 tablet by mouth nightly    medroxyPROGESTERone (PROVERA) 2.5 MG tablet Take 1 tablet by mouth at bedtime    celecoxib (CELEBREX) 200 MG capsule Take 1 capsule by mouth 2 times daily as needed for Pain (Patient taking differently: Take 1 capsule by mouth daily)    fluticasone (FLONASE) 50 MCG/ACT nasal spray 1 spray by Nasal route as needed for Allergies     No current facility-administered medications for this visit.       Allergies:   Allergies   Allergen Reactions    Etodolac Other (See Comments)     Mood Swings.         Physical Exam:     Oriented to person, place and time.    Mood and affect are appropriate.    Respirations are unlabored and there is no evidence of cyanosis.    Wound is healed up nicely without erythema or underlying fluctuance.     She is motor intact.      Radiographic 
 used

## 2024-07-30 ENCOUNTER — TELEPHONE (OUTPATIENT)
Dept: ORTHOPEDIC SURGERY | Age: 64
End: 2024-07-30

## 2024-07-30 ENCOUNTER — PATIENT MESSAGE (OUTPATIENT)
Age: 64
End: 2024-07-30

## 2024-07-30 DIAGNOSIS — Z98.1 STATUS POST CERVICAL SPINAL ARTHRODESIS: Primary | ICD-10-CM

## 2024-07-30 RX ORDER — METHOCARBAMOL 750 MG/1
750 TABLET, FILM COATED ORAL EVERY 8 HOURS PRN
Qty: 30 TABLET | Refills: 1 | Status: SHIPPED | OUTPATIENT
Start: 2024-07-30

## 2024-07-30 NOTE — TELEPHONE ENCOUNTER
Pt called to schedule appt for evelyn hip pain. She saw [facility] in 2022 but  wants to see Dr. Torres. Will he see her?

## 2024-08-20 ENCOUNTER — OFFICE VISIT (OUTPATIENT)
Dept: ORTHOPEDIC SURGERY | Age: 64
End: 2024-08-20
Payer: OTHER GOVERNMENT

## 2024-08-20 DIAGNOSIS — M25.552 BILATERAL HIP PAIN: Primary | ICD-10-CM

## 2024-08-20 DIAGNOSIS — M25.551 BILATERAL HIP PAIN: Primary | ICD-10-CM

## 2024-08-20 PROCEDURE — 99203 OFFICE O/P NEW LOW 30 MIN: CPT | Performed by: ORTHOPAEDIC SURGERY

## 2024-08-20 NOTE — PROGRESS NOTES
Patient ID:    Tameka Johnson  [id number]  63 y.o.  1960    Today: 2024          Chief Complaint: Right hip pain        Patient reports longstanding history of right hip pain.  The pain is predominately localized to the anterior groin and is described as a  general ache with occasional sharp pain.  They rate the pain ranging from 3-8 on the pain scale occurring in a cyclical fashion with periods of acute exacerbation. Symptoms are exacerbated with getting into and out of their vehicle, crossing their legs, and attempting to put on socks/shoes. No significant pain noted with laying on the affected hip. No numbness of tingling going down the extremity.  Patient has attempted prior conservative treatment including Activity modification and NSAIDS.        Past Medical History:  Past Medical History:   Diagnosis Date    Acute pancreatitis     no issues currently    Acute pharyngitis     Anxiety and depression     Cervicalgia     Chondromalacia of patella     Chronic maxillary sinusitis     Chronic UTI     Gastroesophageal reflux disease     managed with med    Irritability     Lateral epicondylitis  of elbow     Lumbago     Migraines     Osteoarthritis     PONV (postoperative nausea and vomiting)     Postmenopausal atrophic vaginitis     Primary hypothyroidism     managed with med    Primary insomnia     Pure hypercholesterolemia     pt states not anymore; working on lowering it with diet change    Rosacea     Vitamin D deficiency        Past Surgical History:  Past Surgical History:   Procedure Laterality Date    CARDIAC CATHETERIZATION  2013    normal    CERVICAL FUSION  2019    ANTERIOR CERVICAL DISCECTOMY W/ FUSION- Dr Lowe    CERVICAL FUSION N/A 4/10/2024    ERAS-C3-C7 laminectomy and C3-T1 posterior fusion with allograft, and lateral mass instrumentation. performed by Johnathan Lowe MD at [facility] MAIN OR     SECTION      CHOLECYSTECTOMY, LAPAROSCOPIC  2000

## 2024-09-04 ENCOUNTER — TELEPHONE (OUTPATIENT)
Dept: ORTHOPEDIC SURGERY | Age: 64
End: 2024-09-04

## 2024-09-05 ENCOUNTER — TELEPHONE (OUTPATIENT)
Dept: ORTHOPEDIC SURGERY | Age: 64
End: 2024-09-05

## 2024-09-06 ENCOUNTER — TELEPHONE (OUTPATIENT)
Dept: ORTHOPEDIC SURGERY | Age: 64
End: 2024-09-06

## 2024-09-19 ENCOUNTER — PATIENT MESSAGE (OUTPATIENT)
Age: 64
End: 2024-09-19

## 2024-09-19 ENCOUNTER — OFFICE VISIT (OUTPATIENT)
Age: 64
End: 2024-09-19
Payer: OTHER GOVERNMENT

## 2024-09-19 VITALS — BODY MASS INDEX: 34.55 KG/M2 | HEIGHT: 61 IN | WEIGHT: 183 LBS

## 2024-09-19 DIAGNOSIS — M54.50 LOW BACK PAIN, UNSPECIFIED BACK PAIN LATERALITY, UNSPECIFIED CHRONICITY, UNSPECIFIED WHETHER SCIATICA PRESENT: ICD-10-CM

## 2024-09-19 DIAGNOSIS — Z98.1 STATUS POST CERVICAL SPINAL ARTHRODESIS: Primary | ICD-10-CM

## 2024-09-19 DIAGNOSIS — M43.16 SPONDYLOLISTHESIS OF LUMBAR REGION: ICD-10-CM

## 2024-09-19 DIAGNOSIS — M48.062 SPINAL STENOSIS, LUMBAR REGION, WITH NEUROGENIC CLAUDICATION: ICD-10-CM

## 2024-09-19 PROCEDURE — 99214 OFFICE O/P EST MOD 30 MIN: CPT | Performed by: ORTHOPAEDIC SURGERY

## 2024-09-19 RX ORDER — OXYCODONE HYDROCHLORIDE 5 MG/1
5 TABLET ORAL EVERY 6 HOURS PRN
Qty: 28 TABLET | Refills: 0 | Status: SHIPPED | OUTPATIENT
Start: 2024-09-19 | End: 2024-09-26

## 2024-09-23 ENCOUNTER — PREP FOR PROCEDURE (OUTPATIENT)
Age: 64
End: 2024-09-23

## 2024-09-23 ENCOUNTER — TELEPHONE (OUTPATIENT)
Dept: ORTHOPEDIC SURGERY | Age: 64
End: 2024-09-23

## 2024-09-23 DIAGNOSIS — M43.16 SPONDYLOLISTHESIS OF LUMBAR REGION: ICD-10-CM

## 2024-09-23 DIAGNOSIS — M48.062 SPINAL STENOSIS, LUMBAR REGION, WITH NEUROGENIC CLAUDICATION: Primary | ICD-10-CM

## 2024-10-11 DIAGNOSIS — M43.16 SPONDYLOLISTHESIS OF LUMBAR REGION: ICD-10-CM

## 2024-10-11 DIAGNOSIS — M48.062 SPINAL STENOSIS, LUMBAR REGION, WITH NEUROGENIC CLAUDICATION: Primary | ICD-10-CM

## 2024-10-11 DIAGNOSIS — M54.16 LUMBAR RADICULOPATHY: ICD-10-CM

## 2024-10-14 ENCOUNTER — OFFICE VISIT (OUTPATIENT)
Dept: ORTHOPEDIC SURGERY | Age: 64
End: 2024-10-14
Payer: OTHER GOVERNMENT

## 2024-10-14 DIAGNOSIS — M54.50 CHRONIC RIGHT-SIDED LOW BACK PAIN, UNSPECIFIED WHETHER SCIATICA PRESENT: ICD-10-CM

## 2024-10-14 DIAGNOSIS — M16.11 OSTEOARTHRITIS OF RIGHT HIP, UNSPECIFIED OSTEOARTHRITIS TYPE: Primary | ICD-10-CM

## 2024-10-14 DIAGNOSIS — G89.29 CHRONIC RIGHT-SIDED LOW BACK PAIN, UNSPECIFIED WHETHER SCIATICA PRESENT: ICD-10-CM

## 2024-10-14 PROCEDURE — 99215 OFFICE O/P EST HI 40 MIN: CPT | Performed by: ORTHOPAEDIC SURGERY

## 2024-10-14 RX ORDER — ACETAMINOPHEN 325 MG/1
1000 TABLET ORAL ONCE
Status: CANCELLED | OUTPATIENT
Start: 2024-10-14 | End: 2024-10-14

## 2024-10-14 NOTE — PROGRESS NOTES
Anxiety and depression     Cervicalgia     Chondromalacia of patella     Chronic maxillary sinusitis     Chronic UTI     Gastroesophageal reflux disease     managed with med    Irritability     Lateral epicondylitis  of elbow     Lumbago     Migraines     Osteoarthritis     PONV (postoperative nausea and vomiting)     Postmenopausal atrophic vaginitis     Primary hypothyroidism     managed with med    Primary insomnia     Pure hypercholesterolemia     pt states not anymore; working on lowering it with diet change    Rosacea     Vitamin D deficiency       Past Surgical History:   Procedure Laterality Date    CARDIAC CATHETERIZATION  2013    normal    CERVICAL FUSION  2019    ANTERIOR CERVICAL DISCECTOMY W/ FUSION- Dr Lowe    CERVICAL FUSION N/A 4/10/2024    ERAS-C3-C7 laminectomy and C3-T1 posterior fusion with allograft, and lateral mass instrumentation. performed by Johnathan Lowe MD at [facility] MAIN OR     SECTION      CHOLECYSTECTOMY, LAPAROSCOPIC      Dr López    COLONOSCOPY      ESOPHAGOGASTRODUODENOSCOPY      LUMBAR LAMINECTOMY      no fusion/hardware    TONSILLECTOMY      TOTAL KNEE ARTHROPLASTY Left 2020        Physical Examination:  Physical exam: On examination of the patient's gait there is  some initial antalgia in stance which improves after few steps.  She does not use a cane and has no real trunk shift in stance.    On examination while standing there is decreased flexion in the lumbosacral spine without acute list or spasm.  She is tender on the right lumbosacral SI joint area and to a lesser degree along the IT band.    While seated ,  the Right hip is examined there is significant groin and anterolateral thigh tenderness, there is trochanteric tenderness to direct palpation, and she has relatively good range of motion although diminished from the opposite side.. On examination of the  Left hip there is full range of motion without obvious issue.     On

## 2024-10-31 ENCOUNTER — HOSPITAL ENCOUNTER (OUTPATIENT)
Dept: SURGERY | Age: 64
Discharge: HOME | End: 2024-11-03
Payer: OTHER GOVERNMENT

## 2024-10-31 VITALS
DIASTOLIC BLOOD PRESSURE: 81 MMHG | HEART RATE: 71 BPM | BODY MASS INDEX: 35.44 KG/M2 | OXYGEN SATURATION: 98 % | HEIGHT: 60 IN | SYSTOLIC BLOOD PRESSURE: 175 MMHG | RESPIRATION RATE: 16 BRPM | WEIGHT: 180.5 LBS | TEMPERATURE: 97.5 F

## 2024-10-31 LAB
ANION GAP SERPL CALC-SCNC: 13 MMOL/L (ref 7–16)
APPEARANCE UR: CLEAR
BASOPHILS # BLD: 0.1 K/UL (ref 0–0.2)
BASOPHILS NFR BLD: 1 % (ref 0–2)
BILIRUB UR QL: NEGATIVE
BUN SERPL-MCNC: 11 MG/DL (ref 8–23)
CALCIUM SERPL-MCNC: 10.2 MG/DL (ref 8.8–10.2)
CHLORIDE SERPL-SCNC: 103 MMOL/L (ref 98–107)
CO2 SERPL-SCNC: 22 MMOL/L (ref 20–29)
COLOR UR: NORMAL
CREAT SERPL-MCNC: 0.82 MG/DL (ref 0.6–1.1)
DIFFERENTIAL METHOD BLD: ABNORMAL
EOSINOPHIL # BLD: 0.2 K/UL (ref 0–0.8)
EOSINOPHIL NFR BLD: 3 % (ref 0.5–7.8)
ERYTHROCYTE [DISTWIDTH] IN BLOOD BY AUTOMATED COUNT: 11.5 % (ref 11.9–14.6)
GLUCOSE SERPL-MCNC: 89 MG/DL (ref 70–99)
GLUCOSE UR STRIP.AUTO-MCNC: NEGATIVE MG/DL
HCT VFR BLD AUTO: 44.3 % (ref 35.8–46.3)
HGB BLD-MCNC: 15.7 G/DL (ref 11.7–15.4)
HGB UR QL STRIP: NEGATIVE
IMM GRANULOCYTES # BLD AUTO: 0 K/UL (ref 0–0.5)
IMM GRANULOCYTES NFR BLD AUTO: 0 % (ref 0–5)
KETONES UR QL STRIP.AUTO: NEGATIVE MG/DL
LEUKOCYTE ESTERASE UR QL STRIP.AUTO: NEGATIVE
LYMPHOCYTES # BLD: 2.1 K/UL (ref 0.5–4.6)
LYMPHOCYTES NFR BLD: 32 % (ref 13–44)
MCH RBC QN AUTO: 33.2 PG (ref 26.1–32.9)
MCHC RBC AUTO-ENTMCNC: 35.4 G/DL (ref 31.4–35)
MCV RBC AUTO: 93.7 FL (ref 82–102)
MONOCYTES # BLD: 0.5 K/UL (ref 0.1–1.3)
MONOCYTES NFR BLD: 7 % (ref 4–12)
MRSA DNA SPEC QL NAA+PROBE: NOT DETECTED
NEUTS SEG # BLD: 3.7 K/UL (ref 1.7–8.2)
NEUTS SEG NFR BLD: 56 % (ref 43–78)
NITRITE UR QL STRIP.AUTO: NEGATIVE
NRBC # BLD: 0 K/UL (ref 0–0.2)
PH UR STRIP: 6.5 (ref 5–9)
PLATELET # BLD AUTO: 247 K/UL (ref 150–450)
PMV BLD AUTO: 11.2 FL (ref 9.4–12.3)
POTASSIUM SERPL-SCNC: 4.4 MMOL/L (ref 3.5–5.1)
PROT UR STRIP-MCNC: NEGATIVE MG/DL
RBC # BLD AUTO: 4.73 M/UL (ref 4.05–5.2)
S AUREUS CPE NOSE QL NAA+PROBE: DETECTED
SODIUM SERPL-SCNC: 138 MMOL/L (ref 136–145)
SP GR UR REFRACTOMETRY: 1.01 (ref 1–1.02)
UROBILINOGEN UR QL STRIP.AUTO: 0.2 EU/DL (ref 0.2–1)
WBC # BLD AUTO: 6.7 K/UL (ref 4.3–11.1)

## 2024-10-31 PROCEDURE — 87641 MR-STAPH DNA AMP PROBE: CPT

## 2024-10-31 PROCEDURE — 80048 BASIC METABOLIC PNL TOTAL CA: CPT

## 2024-10-31 PROCEDURE — 85025 COMPLETE CBC W/AUTO DIFF WBC: CPT

## 2024-10-31 PROCEDURE — 81003 URINALYSIS AUTO W/O SCOPE: CPT

## 2024-10-31 RX ORDER — LEVOTHYROXINE SODIUM 150 MCG
150 TABLET ORAL DAILY
COMMUNITY
Start: 2024-10-11

## 2024-10-31 RX ORDER — CITALOPRAM HYDROBROMIDE 10 MG/1
10 TABLET ORAL DAILY
COMMUNITY
Start: 2024-10-21 | End: 2025-10-21

## 2024-10-31 ASSESSMENT — PAIN SCALES - GENERAL: PAINLEVEL_OUTOF10: 6

## 2024-10-31 ASSESSMENT — PAIN DESCRIPTION - DESCRIPTORS: DESCRIPTORS: SHARP

## 2024-10-31 ASSESSMENT — PAIN DESCRIPTION - ORIENTATION: ORIENTATION: RIGHT

## 2024-10-31 NOTE — PROGRESS NOTES
Pre-Assessment Surgery Review      Patient ID:  Tameka Johnson  [id number]  63 y.o.  1960  Surgeon: Dr Tyson  Date of Surgery: 2024  Procedure:  L5-S1 anterior lumbar interbody fusion    Primary Care Physician: Chai Kim [phone]  Specialty Physician(s):      Subjective:   Tameka Johnson is a 63 y.o. White (non-) female who presents for preoperative evaluation. This is a preoperative chart review note based on data collected by the nurse at the surgical Pre-Assessment visit.    Past Medical History:   Diagnosis Date    Acute pancreatitis     no issues currently    Acute pharyngitis     Anxiety and depression     Cervicalgia     Chondromalacia of patella     Chronic maxillary sinusitis     Chronic UTI     Gastroesophageal reflux disease     managed with med    Irritability     Lateral epicondylitis  of elbow     Lumbago     Migraines     Osteoarthritis     PONV (postoperative nausea and vomiting)     Postmenopausal atrophic vaginitis     Primary hypothyroidism     managed with med    Primary insomnia     Pure hypercholesterolemia     pt states not anymore; working on lowering it with diet change    Rosacea     Vitamin D deficiency       Past Surgical History:   Procedure Laterality Date    CARDIAC CATHETERIZATION  2013    normal    CERVICAL FUSION  2019    ANTERIOR CERVICAL DISCECTOMY W/ FUSION- Dr Tyson    CERVICAL FUSION N/A 4/10/2024    ERAS-C3-C7 laminectomy and C3-T1 posterior fusion with allograft, and lateral mass instrumentation. performed by Johnathan Tyson MD at [facility] MAIN OR     SECTION      CHOLECYSTECTOMY, LAPAROSCOPIC      Dr López    COLONOSCOPY  2012    ESOPHAGOGASTRODUODENOSCOPY      LUMBAR LAMINECTOMY      no fusion/hardware    TONSILLECTOMY      TOTAL KNEE ARTHROPLASTY Left 2020     Family History   Problem Relation Age of Onset    Hypertension Mother     Diabetes Mother     Gall Bladder Disease

## 2024-10-31 NOTE — PERIOP NOTE
Preoperative Nutrition Screen (SHELLI)   Patient's Age: 63 y.o.    Last Serum Albumin Level:  No results found for: \"LABALBU\"  Patient's BMI: Estimated body mass index is 35.15 kg/m² as calculated from the following:    Height as of 9/19/24: 1.537 m (5' 0.5\").    Weight as of 9/19/24: 83 kg (183 lb).     If the answer to any of the following is Yes, then recommend prescribe Oral Nutrition Supplements (ONS) for at least 7 days prior to surgery and/or order referral to dietitian for further assessment and nutrition therapy.    1. Does the patient have a documented serum albumin less than 3.0 within the last 90 days?    Unknown = 0      2. Is patient's BMI less than 18.5 (or less than 20 if age over 65)?  No = 0       3. Has the patient had an unplanned weight loss of 10% of body weight or more in the last 6 months? No = 0   4. Has the patient been eating less than 50% of their normal diet in the preceding week? No = 0   SHELLI Score (number of Yes responses), 0-4 0     Plan:   No Nutrition Intervention indicated    Electronically signed by Sangita Schreiber RN on 10/31/24 at 9:36 AM EDT

## 2024-10-31 NOTE — PERIOP NOTE
Patient verified name, , and surgery as listed in [facility]. Patient provided medical/health information and PTA medications to the best of their ability.    TYPE  CASE: 3  Orders per surgeon: received  Labs per surgeon: CBC w/ diff, BMP, MRSA/MSSA, UA. Results: pending  Labs per anesthesia protocol: T/S held DOS.  EKG: Not needed per anesthesia protocol     MRSA/MSSA swab collected; pharmacy to review and dose antibiotic as appropriate.    Per pt with last surgery 04/10/2024, pt had severe N/V post-op. Per pt was given Zofran and Phenergan with no improvement. Per pt had a Lidocaine drip at that time and as soon as this was stopped, N/V subsided. Anesthesia to review, chart flagged.     Patient provided with and instructed on education handouts including Guide to Surgery,  Preventing Infections, Pain Management, and [facility] brochure.     Road to Recovery Spine surgery patient guide given. Instructed on incentive spirometry.    Surgical skin cleanser and instructions given per hospital policy.    Original medication prescription bottles not visualized during patient appointment.     Patient teach back successful and patient demonstrates knowledge of instruction.      PLEASE CONTINUE TAKING ALL PRESCRIPTION MEDICATIONS UP TO THE DAY OF SURGERY UNLESS OTHERWISE DIRECTED BELOW. You may take Tylenol, allergy,  and/or indigestion medications.     TAKE ONLY THESE MEDICATIONS ON THE DAY OF SURGERY   Albuterol Inhaler (use and bring with you)  Bupropion   Citalopram   Levothyroxine      Esomeprazole     DISCONTINUE all vitamins and supplements 7 days prior to surgery. DISCONTINUE Non-Steroidal Anti-Inflammatory (NSAIDS) such as Advil and Aleve 5 days prior to surgery.     Home Medications to Hold- please continue all other medications except these.      Celecoxib- hold 5 days prior to surgery.        Comments   Hibiclens shower the night before and morning of surgery.     On the day before

## 2024-11-04 DIAGNOSIS — M48.062 SPINAL STENOSIS, LUMBAR REGION, WITH NEUROGENIC CLAUDICATION: ICD-10-CM

## 2024-11-04 DIAGNOSIS — M43.16 SPONDYLOLISTHESIS OF LUMBAR REGION: ICD-10-CM

## 2024-11-08 ENCOUNTER — OFFICE VISIT (OUTPATIENT)
Age: 64
End: 2024-11-08
Payer: OTHER GOVERNMENT

## 2024-11-08 DIAGNOSIS — M43.16 SPONDYLOLISTHESIS OF LUMBAR REGION: ICD-10-CM

## 2024-11-08 DIAGNOSIS — M48.062 SPINAL STENOSIS, LUMBAR REGION, WITH NEUROGENIC CLAUDICATION: Primary | ICD-10-CM

## 2024-11-08 DIAGNOSIS — M54.16 LUMBAR RADICULOPATHY: ICD-10-CM

## 2024-11-08 PROCEDURE — 99214 OFFICE O/P EST MOD 30 MIN: CPT | Performed by: ORTHOPAEDIC SURGERY

## 2024-11-08 NOTE — PROGRESS NOTES
Name: Tameka Johnson  YOB: 1960  Gender: female  MRN: [id number]  Age: 64 y.o.      Chief Complaint:  Back and hip pain     History of Present Illness:      This is a very pleasant 64 y.o. female who is here for follow-up of her C3-T1 laminectomy and fusion surgery which is going pretty well.  She also wanted to have her low back addressed where she has previously had a left L4-L5 and right L5-S1 hemilaminectomy nearly 10 years ago.  She has had increasing levels of low back pain with radiation to the right greater than left hip and leg.            Medications:       Current Outpatient Medications:     citalopram (CELEXA) 10 MG tablet, Take 1 tablet by mouth daily, Disp: , Rfl:     SYNTHROID 150 MCG tablet, Take 1 tablet by mouth Daily, Disp: , Rfl:     methocarbamol (ROBAXIN) 750 MG tablet, Take 1 tablet by mouth every 8 hours as needed (spasm), Disp: 30 tablet, Rfl: 1    buPROPion (WELLBUTRIN SR) 150 MG extended release tablet, Take 1 tablet by mouth daily, Disp: , Rfl:     ALBUTEROL IN, Inhale into the lungs as needed, Disp: , Rfl:     estrogens, conjugated,-methylTESTOSTERone (ESTRATEST HS) 0.625-1.25 MG per tablet, Take 1 tablet by mouth at bedtime., Disp: , Rfl:     esomeprazole (NEXIUM) 40 MG delayed release capsule, Take 1 capsule by mouth every morning (before breakfast), Disp: , Rfl:     famotidine (PEPCID) 20 MG tablet, Take 1 tablet by mouth nightly, Disp: , Rfl:     medroxyPROGESTERone (PROVERA) 2.5 MG tablet, Take 1 tablet by mouth at bedtime, Disp: , Rfl:     celecoxib (CELEBREX) 200 MG capsule, Take 1 capsule by mouth 2 times daily as needed for Pain (Patient taking differently: Take 1 capsule by mouth 2 times daily), Disp: 60 capsule, Rfl: 0    fluticasone (FLONASE) 50 MCG/ACT nasal spray, 1 spray by Nasal route as needed for Allergies, Disp: , Rfl:     Allergies:    Allergies   Allergen Reactions    Etodolac Other (See Comments)     Mood Swings.          Physical Exam:

## 2024-11-18 ENCOUNTER — ANESTHESIA EVENT (OUTPATIENT)
Dept: SURGERY | Age: 64
DRG: 455 | End: 2024-11-18
Payer: OTHER GOVERNMENT

## 2024-11-18 NOTE — ANESTHESIA PRE PROCEDURE
Department of Anesthesiology  Preprocedure Note       Name:  Tameka Johnson   Age:  64 y.o.  :  1960                                          MRN:  [id number]         Date:  2024      Surgeon: Surgeon(s):  Johnathan Lowe MD Johnston, Jeffrey Thomas, MD    Procedure: Procedure(s):  ERAS\L5-S1 anterior lumbar interbody fusion with interbody spacer/allograft  ERAS/L5-S1 laminectomy and fusion with allograft and instrumentation    Medications prior to admission:   Prior to Admission medications    Medication Sig Start Date End Date Taking? Authorizing Provider   citalopram (CELEXA) 10 MG tablet Take 1 tablet by mouth daily 10/21/24 10/21/25  Amanda Blackman MD   SYNTHROID 150 MCG tablet Take 1 tablet by mouth Daily 10/11/24   Amanda Blackman MD   methocarbamol (ROBAXIN) 750 MG tablet Take 1 tablet by mouth every 8 hours as needed (spasm) 24   Johnathan Lowe MD   buPROPion (WELLBUTRIN SR) 150 MG extended release tablet Take 1 tablet by mouth daily 3/21/24   Amanda Blackman MD   ALBUTEROL IN Inhale into the lungs as needed    Amanda Blackman MD   estrogens, conjugated,-methylTESTOSTERone (ESTRATEST HS) 0.625-1.25 MG per tablet Take 1 tablet by mouth at bedtime.    Amanda Blackman MD   esomeprazole (NEXIUM) 40 MG delayed release capsule Take 1 capsule by mouth every morning (before breakfast) 24  Amanda Blackman MD   famotidine (PEPCID) 20 MG tablet Take 1 tablet by mouth nightly 24  Amanda Blackman MD   medroxyPROGESTERone (PROVERA) 2.5 MG tablet Take 1 tablet by mouth at bedtime 24   Amanda Blackman MD   celecoxib (CELEBREX) 200 MG capsule Take 1 capsule by mouth 2 times daily as needed for Pain  Patient taking differently: Take 1 capsule by mouth 2 times daily 10/28/22   Todd Alas, APRN - CNP   fluticasone (FLONASE) 50 MCG/ACT nasal spray 1 spray by Nasal route as needed for Allergies

## 2024-11-19 ENCOUNTER — APPOINTMENT (OUTPATIENT)
Dept: GENERAL RADIOLOGY | Age: 64
DRG: 455 | End: 2024-11-19
Attending: ORTHOPAEDIC SURGERY
Payer: OTHER GOVERNMENT

## 2024-11-19 ENCOUNTER — HOSPITAL ENCOUNTER (INPATIENT)
Age: 64
LOS: 3 days | Discharge: HOME HEALTH | DRG: 455 | End: 2024-11-22
Attending: ORTHOPAEDIC SURGERY | Admitting: ORTHOPAEDIC SURGERY
Payer: OTHER GOVERNMENT

## 2024-11-19 ENCOUNTER — ANESTHESIA (OUTPATIENT)
Dept: SURGERY | Age: 64
DRG: 455 | End: 2024-11-19
Payer: OTHER GOVERNMENT

## 2024-11-19 DIAGNOSIS — Z98.1 S/P LUMBAR FUSION: Primary | ICD-10-CM

## 2024-11-19 LAB
ABO + RH BLD: NORMAL
BLOOD GROUP ANTIBODIES SERPL: NORMAL
SPECIMEN EXP DATE BLD: NORMAL

## 2024-11-19 PROCEDURE — 6370000000 HC RX 637 (ALT 250 FOR IP): Performed by: ORTHOPAEDIC SURGERY

## 2024-11-19 PROCEDURE — 01NB0ZZ RELEASE LUMBAR NERVE, OPEN APPROACH: ICD-10-PCS | Performed by: ORTHOPAEDIC SURGERY

## 2024-11-19 PROCEDURE — 01NR0ZZ RELEASE SACRAL NERVE, OPEN APPROACH: ICD-10-PCS | Performed by: ORTHOPAEDIC SURGERY

## 2024-11-19 PROCEDURE — 6360000002 HC RX W HCPCS: Performed by: ANESTHESIOLOGY

## 2024-11-19 PROCEDURE — 0ST40ZZ RESECTION OF LUMBOSACRAL DISC, OPEN APPROACH: ICD-10-PCS | Performed by: ORTHOPAEDIC SURGERY

## 2024-11-19 PROCEDURE — 86901 BLOOD TYPING SEROLOGIC RH(D): CPT

## 2024-11-19 PROCEDURE — 3600000004 HC SURGERY LEVEL 4 BASE: Performed by: ORTHOPAEDIC SURGERY

## 2024-11-19 PROCEDURE — 6360000002 HC RX W HCPCS

## 2024-11-19 PROCEDURE — 72100 X-RAY EXAM L-S SPINE 2/3 VWS: CPT

## 2024-11-19 PROCEDURE — 6370000000 HC RX 637 (ALT 250 FOR IP): Performed by: ANESTHESIOLOGY

## 2024-11-19 PROCEDURE — 2580000003 HC RX 258: Performed by: ANESTHESIOLOGY

## 2024-11-19 PROCEDURE — 2500000003 HC RX 250 WO HCPCS: Performed by: ORTHOPAEDIC SURGERY

## 2024-11-19 PROCEDURE — 7100000000 HC PACU RECOVERY - FIRST 15 MIN: Performed by: ORTHOPAEDIC SURGERY

## 2024-11-19 PROCEDURE — 86850 RBC ANTIBODY SCREEN: CPT

## 2024-11-19 PROCEDURE — 0SG30K1 FUSION OF LUMBOSACRAL JOINT WITH NONAUTOLOGOUS TISSUE SUBSTITUTE, POSTERIOR APPROACH, POSTERIOR COLUMN, OPEN APPROACH: ICD-10-PCS | Performed by: ORTHOPAEDIC SURGERY

## 2024-11-19 PROCEDURE — 2720000010 HC SURG SUPPLY STERILE: Performed by: ORTHOPAEDIC SURGERY

## 2024-11-19 PROCEDURE — 6360000002 HC RX W HCPCS: Performed by: ORTHOPAEDIC SURGERY

## 2024-11-19 PROCEDURE — 2500000003 HC RX 250 WO HCPCS

## 2024-11-19 PROCEDURE — 2709999900 HC NON-CHARGEABLE SUPPLY: Performed by: ORTHOPAEDIC SURGERY

## 2024-11-19 PROCEDURE — 22558 ARTHRD ANT NTRBD MIN DSC LUM: CPT | Performed by: STUDENT IN AN ORGANIZED HEALTH CARE EDUCATION/TRAINING PROGRAM

## 2024-11-19 PROCEDURE — C1713 ANCHOR/SCREW BN/BN,TIS/BN: HCPCS | Performed by: ORTHOPAEDIC SURGERY

## 2024-11-19 PROCEDURE — 1100000000 HC RM PRIVATE

## 2024-11-19 PROCEDURE — 2580000003 HC RX 258

## 2024-11-19 PROCEDURE — C1889 IMPLANT/INSERT DEVICE, NOC: HCPCS | Performed by: ORTHOPAEDIC SURGERY

## 2024-11-19 PROCEDURE — C1768 GRAFT, VASCULAR: HCPCS | Performed by: ORTHOPAEDIC SURGERY

## 2024-11-19 PROCEDURE — 3700000001 HC ADD 15 MINUTES (ANESTHESIA): Performed by: ORTHOPAEDIC SURGERY

## 2024-11-19 PROCEDURE — 3700000000 HC ANESTHESIA ATTENDED CARE: Performed by: ORTHOPAEDIC SURGERY

## 2024-11-19 PROCEDURE — 2580000003 HC RX 258: Performed by: ORTHOPAEDIC SURGERY

## 2024-11-19 PROCEDURE — 7100000001 HC PACU RECOVERY - ADDTL 15 MIN: Performed by: ORTHOPAEDIC SURGERY

## 2024-11-19 PROCEDURE — 86900 BLOOD TYPING SEROLOGIC ABO: CPT

## 2024-11-19 PROCEDURE — 3600000014 HC SURGERY LEVEL 4 ADDTL 15MIN: Performed by: ORTHOPAEDIC SURGERY

## 2024-11-19 PROCEDURE — 0SG30A0 FUSION OF LUMBOSACRAL JOINT WITH INTERBODY FUSION DEVICE, ANTERIOR APPROACH, ANTERIOR COLUMN, OPEN APPROACH: ICD-10-PCS | Performed by: ORTHOPAEDIC SURGERY

## 2024-11-19 DEVICE — VITALLIUM PREBENT AND PRECUT ROD WITHOUT HEX
Type: IMPLANTABLE DEVICE | Site: SPINE LUMBAR | Status: FUNCTIONAL
Brand: XIA 4 5

## 2024-11-19 DEVICE — ALLOGRAFT BNE CHIP 1-4 MM 15 CC CRUSH CANC: Type: IMPLANTABLE DEVICE | Site: SPINE LUMBAR | Status: FUNCTIONAL

## 2024-11-19 DEVICE — GRAFT BNE LG: Type: IMPLANTABLE DEVICE | Site: SPINE LUMBAR | Status: FUNCTIONAL

## 2024-11-19 DEVICE — SCREW
Type: IMPLANTABLE DEVICE | Site: SPINE LUMBAR | Status: FUNCTIONAL
Brand: MONTEREY AL

## 2024-11-19 DEVICE — BLOCKER
Type: IMPLANTABLE DEVICE | Site: SPINE LUMBAR | Status: FUNCTIONAL
Brand: XIA 4.5 SYSTEM -  XIA CT

## 2024-11-19 DEVICE — POLYAXIAL CORTICAL SCREW
Type: IMPLANTABLE DEVICE | Site: SPINE LUMBAR | Status: FUNCTIONAL
Brand: XIA 4.5 SYSTEM -  XIA CT

## 2024-11-19 DEVICE — ANTERIOR LUMBAR CAGE
Type: IMPLANTABLE DEVICE | Site: SPINE LUMBAR | Status: FUNCTIONAL
Brand: MONTEREY AL

## 2024-11-19 DEVICE — TITANIUM PREBENT AND PRECUT ROD WITHOUT HEX
Type: IMPLANTABLE DEVICE | Site: SPINE LUMBAR | Status: FUNCTIONAL
Brand: XIA 4 5

## 2024-11-19 DEVICE — BIO DBM PLUS PUTTY (WITH CANCELLOUS)
Type: IMPLANTABLE DEVICE | Site: SPINE LUMBAR | Status: FUNCTIONAL
Brand: BIO DBM

## 2024-11-19 RX ORDER — HYDROMORPHONE HYDROCHLORIDE 1 MG/ML
0.5 INJECTION, SOLUTION INTRAMUSCULAR; INTRAVENOUS; SUBCUTANEOUS
Status: DISCONTINUED | OUTPATIENT
Start: 2024-11-19 | End: 2024-11-22 | Stop reason: HOSPADM

## 2024-11-19 RX ORDER — DIPHENHYDRAMINE HYDROCHLORIDE 50 MG/ML
25 INJECTION INTRAMUSCULAR; INTRAVENOUS EVERY 6 HOURS PRN
Status: DISCONTINUED | OUTPATIENT
Start: 2024-11-19 | End: 2024-11-22 | Stop reason: HOSPADM

## 2024-11-19 RX ORDER — TRAMADOL HYDROCHLORIDE 50 MG/1
50 TABLET ORAL EVERY 6 HOURS PRN
Status: DISCONTINUED | OUTPATIENT
Start: 2024-11-19 | End: 2024-11-22 | Stop reason: HOSPADM

## 2024-11-19 RX ORDER — LIDOCAINE HYDROCHLORIDE 10 MG/ML
1 INJECTION, SOLUTION INFILTRATION; PERINEURAL
Status: DISCONTINUED | OUTPATIENT
Start: 2024-11-19 | End: 2024-11-19 | Stop reason: HOSPADM

## 2024-11-19 RX ORDER — ONDANSETRON 2 MG/ML
INJECTION INTRAMUSCULAR; INTRAVENOUS
Status: DISCONTINUED | OUTPATIENT
Start: 2024-11-19 | End: 2024-11-19 | Stop reason: SDUPTHER

## 2024-11-19 RX ORDER — HYDROMORPHONE HYDROCHLORIDE 2 MG/ML
INJECTION, SOLUTION INTRAMUSCULAR; INTRAVENOUS; SUBCUTANEOUS
Status: DISCONTINUED | OUTPATIENT
Start: 2024-11-19 | End: 2024-11-19 | Stop reason: SDUPTHER

## 2024-11-19 RX ORDER — SODIUM CHLORIDE, SODIUM LACTATE, POTASSIUM CHLORIDE, CALCIUM CHLORIDE 600; 310; 30; 20 MG/100ML; MG/100ML; MG/100ML; MG/100ML
INJECTION, SOLUTION INTRAVENOUS CONTINUOUS
Status: DISCONTINUED | OUTPATIENT
Start: 2024-11-19 | End: 2024-11-19 | Stop reason: HOSPADM

## 2024-11-19 RX ORDER — SODIUM CHLORIDE, SODIUM LACTATE, POTASSIUM CHLORIDE, CALCIUM CHLORIDE 600; 310; 30; 20 MG/100ML; MG/100ML; MG/100ML; MG/100ML
INJECTION, SOLUTION INTRAVENOUS
Status: DISCONTINUED | OUTPATIENT
Start: 2024-11-19 | End: 2024-11-19 | Stop reason: SDUPTHER

## 2024-11-19 RX ORDER — MEDROXYPROGESTERONE ACETATE 2.5 MG/1
2.5 TABLET ORAL NIGHTLY
Status: DISCONTINUED | OUTPATIENT
Start: 2024-11-19 | End: 2024-11-22 | Stop reason: HOSPADM

## 2024-11-19 RX ORDER — ESTERIFIED ESTROGEN AND METHYLTESTOSTERONE .625; 1.25 MG/1; MG/1
1 TABLET ORAL NIGHTLY
Status: DISCONTINUED | OUTPATIENT
Start: 2024-11-19 | End: 2024-11-22 | Stop reason: HOSPADM

## 2024-11-19 RX ORDER — DEXAMETHASONE SODIUM PHOSPHATE 10 MG/ML
INJECTION INTRAMUSCULAR; INTRAVENOUS
Status: DISCONTINUED | OUTPATIENT
Start: 2024-11-19 | End: 2024-11-19 | Stop reason: SDUPTHER

## 2024-11-19 RX ORDER — NALOXONE HYDROCHLORIDE 0.4 MG/ML
INJECTION, SOLUTION INTRAMUSCULAR; INTRAVENOUS; SUBCUTANEOUS PRN
Status: DISCONTINUED | OUTPATIENT
Start: 2024-11-19 | End: 2024-11-19 | Stop reason: HOSPADM

## 2024-11-19 RX ORDER — ROCURONIUM BROMIDE 10 MG/ML
INJECTION, SOLUTION INTRAVENOUS
Status: DISCONTINUED | OUTPATIENT
Start: 2024-11-19 | End: 2024-11-19 | Stop reason: SDUPTHER

## 2024-11-19 RX ORDER — SODIUM CHLORIDE 0.9 % (FLUSH) 0.9 %
5-40 SYRINGE (ML) INJECTION PRN
Status: DISCONTINUED | OUTPATIENT
Start: 2024-11-19 | End: 2024-11-22 | Stop reason: HOSPADM

## 2024-11-19 RX ORDER — EPHEDRINE SULFATE 5 MG/ML
INJECTION INTRAVENOUS
Status: DISCONTINUED | OUTPATIENT
Start: 2024-11-19 | End: 2024-11-19 | Stop reason: SDUPTHER

## 2024-11-19 RX ORDER — APREPITANT 40 MG/1
40 CAPSULE ORAL ONCE
Status: DISCONTINUED | OUTPATIENT
Start: 2024-11-19 | End: 2024-11-19 | Stop reason: CLARIF

## 2024-11-19 RX ORDER — ACETAMINOPHEN 500 MG
1000 TABLET ORAL ONCE
Status: DISCONTINUED | OUTPATIENT
Start: 2024-11-19 | End: 2024-11-19 | Stop reason: SDUPTHER

## 2024-11-19 RX ORDER — ACETAMINOPHEN 325 MG/1
650 TABLET ORAL EVERY 6 HOURS
Status: DISCONTINUED | OUTPATIENT
Start: 2024-11-19 | End: 2024-11-22 | Stop reason: HOSPADM

## 2024-11-19 RX ORDER — LEVOTHYROXINE SODIUM 75 UG/1
150 TABLET ORAL DAILY
Status: DISCONTINUED | OUTPATIENT
Start: 2024-11-19 | End: 2024-11-22 | Stop reason: HOSPADM

## 2024-11-19 RX ORDER — SODIUM CHLORIDE 0.9 % (FLUSH) 0.9 %
5-40 SYRINGE (ML) INJECTION EVERY 12 HOURS SCHEDULED
Status: DISCONTINUED | OUTPATIENT
Start: 2024-11-19 | End: 2024-11-22 | Stop reason: HOSPADM

## 2024-11-19 RX ORDER — SODIUM CHLORIDE 9 MG/ML
INJECTION, SOLUTION INTRAVENOUS CONTINUOUS
Status: ACTIVE | OUTPATIENT
Start: 2024-11-19 | End: 2024-11-19

## 2024-11-19 RX ORDER — POLYETHYLENE GLYCOL 3350 17 G/17G
17 POWDER, FOR SOLUTION ORAL DAILY
Status: DISCONTINUED | OUTPATIENT
Start: 2024-11-19 | End: 2024-11-22 | Stop reason: HOSPADM

## 2024-11-19 RX ORDER — SODIUM CHLORIDE 0.9 % (FLUSH) 0.9 %
5-40 SYRINGE (ML) INJECTION EVERY 12 HOURS SCHEDULED
Status: DISCONTINUED | OUTPATIENT
Start: 2024-11-19 | End: 2024-11-19 | Stop reason: HOSPADM

## 2024-11-19 RX ORDER — PROMETHAZINE HYDROCHLORIDE 12.5 MG/1
12.5 TABLET ORAL EVERY 6 HOURS PRN
Status: DISCONTINUED | OUTPATIENT
Start: 2024-11-19 | End: 2024-11-22 | Stop reason: HOSPADM

## 2024-11-19 RX ORDER — LIDOCAINE HYDROCHLORIDE 20 MG/ML
INJECTION, SOLUTION EPIDURAL; INFILTRATION; INTRACAUDAL; PERINEURAL
Status: DISCONTINUED | OUTPATIENT
Start: 2024-11-19 | End: 2024-11-19 | Stop reason: SDUPTHER

## 2024-11-19 RX ORDER — KETAMINE HCL IN NACL, ISO-OSM 20 MG/2 ML
SYRINGE (ML) INJECTION
Status: DISCONTINUED | OUTPATIENT
Start: 2024-11-19 | End: 2024-11-19 | Stop reason: SDUPTHER

## 2024-11-19 RX ORDER — TRANEXAMIC ACID 100 MG/ML
INJECTION, SOLUTION INTRAVENOUS
Status: DISCONTINUED | OUTPATIENT
Start: 2024-11-19 | End: 2024-11-19 | Stop reason: SDUPTHER

## 2024-11-19 RX ORDER — KETOROLAC TROMETHAMINE 30 MG/ML
30 INJECTION, SOLUTION INTRAMUSCULAR; INTRAVENOUS EVERY 6 HOURS
Status: COMPLETED | OUTPATIENT
Start: 2024-11-19 | End: 2024-11-20

## 2024-11-19 RX ORDER — PROCHLORPERAZINE EDISYLATE 5 MG/ML
5 INJECTION INTRAMUSCULAR; INTRAVENOUS
Status: DISCONTINUED | OUTPATIENT
Start: 2024-11-19 | End: 2024-11-19 | Stop reason: HOSPADM

## 2024-11-19 RX ORDER — OXYCODONE HYDROCHLORIDE 5 MG/1
5 TABLET ORAL
Status: DISCONTINUED | OUTPATIENT
Start: 2024-11-19 | End: 2024-11-19 | Stop reason: HOSPADM

## 2024-11-19 RX ORDER — SODIUM CHLORIDE 0.9 % (FLUSH) 0.9 %
5-40 SYRINGE (ML) INJECTION PRN
Status: DISCONTINUED | OUTPATIENT
Start: 2024-11-19 | End: 2024-11-19 | Stop reason: HOSPADM

## 2024-11-19 RX ORDER — BISACODYL 5 MG/1
5 TABLET, DELAYED RELEASE ORAL DAILY
Status: DISCONTINUED | OUTPATIENT
Start: 2024-11-19 | End: 2024-11-22 | Stop reason: HOSPADM

## 2024-11-19 RX ORDER — HYDROMORPHONE HYDROCHLORIDE 2 MG/ML
0.5 INJECTION, SOLUTION INTRAMUSCULAR; INTRAVENOUS; SUBCUTANEOUS EVERY 5 MIN PRN
Status: DISCONTINUED | OUTPATIENT
Start: 2024-11-19 | End: 2024-11-19 | Stop reason: HOSPADM

## 2024-11-19 RX ORDER — SODIUM CHLORIDE 9 MG/ML
INJECTION, SOLUTION INTRAVENOUS PRN
Status: DISCONTINUED | OUTPATIENT
Start: 2024-11-19 | End: 2024-11-22 | Stop reason: HOSPADM

## 2024-11-19 RX ORDER — MIDAZOLAM HYDROCHLORIDE 1 MG/ML
INJECTION, SOLUTION INTRAMUSCULAR; INTRAVENOUS
Status: DISCONTINUED | OUTPATIENT
Start: 2024-11-19 | End: 2024-11-19 | Stop reason: SDUPTHER

## 2024-11-19 RX ORDER — GLYCOPYRROLATE 0.2 MG/ML
INJECTION INTRAMUSCULAR; INTRAVENOUS
Status: DISCONTINUED | OUTPATIENT
Start: 2024-11-19 | End: 2024-11-19 | Stop reason: SDUPTHER

## 2024-11-19 RX ORDER — CEFAZOLIN SODIUM 1 G/3ML
INJECTION, POWDER, FOR SOLUTION INTRAMUSCULAR; INTRAVENOUS
Status: DISCONTINUED | OUTPATIENT
Start: 2024-11-19 | End: 2024-11-19 | Stop reason: SDUPTHER

## 2024-11-19 RX ORDER — FAMOTIDINE 20 MG/1
20 TABLET, FILM COATED ORAL NIGHTLY
Status: DISCONTINUED | OUTPATIENT
Start: 2024-11-19 | End: 2024-11-22 | Stop reason: HOSPADM

## 2024-11-19 RX ORDER — ESMOLOL HYDROCHLORIDE 10 MG/ML
INJECTION INTRAVENOUS
Status: DISCONTINUED | OUTPATIENT
Start: 2024-11-19 | End: 2024-11-19 | Stop reason: SDUPTHER

## 2024-11-19 RX ORDER — TRAMADOL HYDROCHLORIDE 50 MG/1
100 TABLET ORAL EVERY 6 HOURS PRN
Status: DISCONTINUED | OUTPATIENT
Start: 2024-11-19 | End: 2024-11-22 | Stop reason: HOSPADM

## 2024-11-19 RX ORDER — ONDANSETRON 2 MG/ML
4 INJECTION INTRAMUSCULAR; INTRAVENOUS
Status: DISCONTINUED | OUTPATIENT
Start: 2024-11-19 | End: 2024-11-19 | Stop reason: HOSPADM

## 2024-11-19 RX ORDER — PANTOPRAZOLE SODIUM 40 MG/1
40 TABLET, DELAYED RELEASE ORAL
Status: DISCONTINUED | OUTPATIENT
Start: 2024-11-20 | End: 2024-11-22 | Stop reason: HOSPADM

## 2024-11-19 RX ORDER — ONDANSETRON 2 MG/ML
4 INJECTION INTRAMUSCULAR; INTRAVENOUS EVERY 6 HOURS PRN
Status: DISCONTINUED | OUTPATIENT
Start: 2024-11-19 | End: 2024-11-22 | Stop reason: HOSPADM

## 2024-11-19 RX ORDER — TRAMADOL HYDROCHLORIDE 50 MG/1
50 TABLET ORAL EVERY 6 HOURS PRN
Qty: 28 TABLET | Refills: 0 | Status: SHIPPED | OUTPATIENT
Start: 2024-11-19 | End: 2024-11-26

## 2024-11-19 RX ORDER — DIPHENHYDRAMINE HCL 25 MG
25 CAPSULE ORAL EVERY 6 HOURS PRN
Status: DISCONTINUED | OUTPATIENT
Start: 2024-11-19 | End: 2024-11-22 | Stop reason: HOSPADM

## 2024-11-19 RX ORDER — ACETAMINOPHEN 500 MG
1000 TABLET ORAL ONCE
Status: COMPLETED | OUTPATIENT
Start: 2024-11-19 | End: 2024-11-19

## 2024-11-19 RX ORDER — BUPROPION HYDROCHLORIDE 150 MG/1
150 TABLET, EXTENDED RELEASE ORAL DAILY
Status: DISCONTINUED | OUTPATIENT
Start: 2024-11-20 | End: 2024-11-19 | Stop reason: CLARIF

## 2024-11-19 RX ORDER — SODIUM CHLORIDE 9 MG/ML
INJECTION, SOLUTION INTRAVENOUS PRN
Status: DISCONTINUED | OUTPATIENT
Start: 2024-11-19 | End: 2024-11-19 | Stop reason: HOSPADM

## 2024-11-19 RX ORDER — PROPOFOL 10 MG/ML
INJECTION, EMULSION INTRAVENOUS
Status: DISCONTINUED | OUTPATIENT
Start: 2024-11-19 | End: 2024-11-19 | Stop reason: SDUPTHER

## 2024-11-19 RX ORDER — BUPROPION HYDROCHLORIDE 150 MG/1
150 TABLET ORAL DAILY
Status: DISCONTINUED | OUTPATIENT
Start: 2024-11-20 | End: 2024-11-22 | Stop reason: HOSPADM

## 2024-11-19 RX ORDER — CYCLOBENZAPRINE HCL 10 MG
10 TABLET ORAL 3 TIMES DAILY PRN
Status: DISCONTINUED | OUTPATIENT
Start: 2024-11-19 | End: 2024-11-22 | Stop reason: HOSPADM

## 2024-11-19 RX ORDER — TRANEXAMIC ACID 650 MG/1
1300 TABLET ORAL 2 TIMES DAILY
Status: COMPLETED | OUTPATIENT
Start: 2024-11-19 | End: 2024-11-20

## 2024-11-19 RX ORDER — HYDROMORPHONE HYDROCHLORIDE 2 MG/ML
0.5 INJECTION, SOLUTION INTRAMUSCULAR; INTRAVENOUS; SUBCUTANEOUS EVERY 5 MIN PRN
Status: COMPLETED | OUTPATIENT
Start: 2024-11-19 | End: 2024-11-19

## 2024-11-19 RX ORDER — VANCOMYCIN HYDROCHLORIDE 1 G/20ML
INJECTION, POWDER, LYOPHILIZED, FOR SOLUTION INTRAVENOUS PRN
Status: DISCONTINUED | OUTPATIENT
Start: 2024-11-19 | End: 2024-11-19 | Stop reason: ALTCHOICE

## 2024-11-19 RX ADMIN — PHENYLEPHRINE HYDROCHLORIDE 100 MCG: 10 INJECTION INTRAVENOUS at 09:58

## 2024-11-19 RX ADMIN — Medication 10 MG: at 09:00

## 2024-11-19 RX ADMIN — PHENYLEPHRINE HYDROCHLORIDE 100 MCG: 10 INJECTION INTRAVENOUS at 09:02

## 2024-11-19 RX ADMIN — PHENYLEPHRINE HYDROCHLORIDE 100 MCG: 10 INJECTION INTRAVENOUS at 09:19

## 2024-11-19 RX ADMIN — PROPOFOL 50 MG: 10 INJECTION, EMULSION INTRAVENOUS at 11:23

## 2024-11-19 RX ADMIN — CEFAZOLIN 2000 MG: 2 INJECTION, POWDER, FOR SOLUTION INTRAMUSCULAR; INTRAVENOUS at 23:14

## 2024-11-19 RX ADMIN — FAMOTIDINE 20 MG: 20 TABLET, FILM COATED ORAL at 20:00

## 2024-11-19 RX ADMIN — HYDROMORPHONE HYDROCHLORIDE 0.2 MG: 2 INJECTION INTRAMUSCULAR; INTRAVENOUS; SUBCUTANEOUS at 11:50

## 2024-11-19 RX ADMIN — EPHEDRINE SULFATE 10 MG: 5 INJECTION INTRAVENOUS at 08:38

## 2024-11-19 RX ADMIN — SODIUM CHLORIDE, PRESERVATIVE FREE 10 ML: 5 INJECTION INTRAVENOUS at 20:08

## 2024-11-19 RX ADMIN — PROPOFOL 200 MG: 10 INJECTION, EMULSION INTRAVENOUS at 08:03

## 2024-11-19 RX ADMIN — BISACODYL 5 MG: 5 TABLET, COATED ORAL at 15:23

## 2024-11-19 RX ADMIN — HYDROMORPHONE HYDROCHLORIDE 0.4 MG: 2 INJECTION INTRAMUSCULAR; INTRAVENOUS; SUBCUTANEOUS at 08:03

## 2024-11-19 RX ADMIN — Medication 20 MG: at 08:03

## 2024-11-19 RX ADMIN — HYDROMORPHONE HYDROCHLORIDE 0.6 MG: 2 INJECTION INTRAMUSCULAR; INTRAVENOUS; SUBCUTANEOUS at 09:08

## 2024-11-19 RX ADMIN — SUGAMMADEX 200 MG: 100 INJECTION, SOLUTION INTRAVENOUS at 12:23

## 2024-11-19 RX ADMIN — HYDROMORPHONE HYDROCHLORIDE 0.5 MG: 1 INJECTION, SOLUTION INTRAMUSCULAR; INTRAVENOUS; SUBCUTANEOUS at 16:06

## 2024-11-19 RX ADMIN — EPHEDRINE SULFATE 5 MG: 5 INJECTION INTRAVENOUS at 08:32

## 2024-11-19 RX ADMIN — KETOROLAC TROMETHAMINE 30 MG: 30 INJECTION, SOLUTION INTRAMUSCULAR at 15:23

## 2024-11-19 RX ADMIN — PHENYLEPHRINE HYDROCHLORIDE 100 MCG: 10 INJECTION INTRAVENOUS at 09:42

## 2024-11-19 RX ADMIN — Medication 3 AMPULE: at 06:14

## 2024-11-19 RX ADMIN — PHENYLEPHRINE HYDROCHLORIDE 100 MCG: 10 INJECTION INTRAVENOUS at 10:55

## 2024-11-19 RX ADMIN — ROCURONIUM BROMIDE 20 MG: 10 INJECTION, SOLUTION INTRAVENOUS at 10:22

## 2024-11-19 RX ADMIN — ONDANSETRON 4 MG: 2 INJECTION INTRAMUSCULAR; INTRAVENOUS at 12:04

## 2024-11-19 RX ADMIN — CEFAZOLIN 2000 MG: 2 INJECTION, POWDER, FOR SOLUTION INTRAMUSCULAR; INTRAVENOUS at 15:22

## 2024-11-19 RX ADMIN — ACETAMINOPHEN 650 MG: 325 TABLET ORAL at 15:23

## 2024-11-19 RX ADMIN — PHENYLEPHRINE HYDROCHLORIDE 200 MCG: 10 INJECTION INTRAVENOUS at 10:41

## 2024-11-19 RX ADMIN — SODIUM CHLORIDE, PRESERVATIVE FREE 10 ML: 5 INJECTION INTRAVENOUS at 06:11

## 2024-11-19 RX ADMIN — PHENYLEPHRINE HYDROCHLORIDE 200 MCG: 10 INJECTION INTRAVENOUS at 10:07

## 2024-11-19 RX ADMIN — HYDROMORPHONE HYDROCHLORIDE 0.5 MG: 2 INJECTION INTRAMUSCULAR; INTRAVENOUS; SUBCUTANEOUS at 14:15

## 2024-11-19 RX ADMIN — PHENYLEPHRINE HYDROCHLORIDE 100 MCG: 10 INJECTION INTRAVENOUS at 11:54

## 2024-11-19 RX ADMIN — PHENYLEPHRINE HYDROCHLORIDE 100 MCG: 10 INJECTION INTRAVENOUS at 11:11

## 2024-11-19 RX ADMIN — PHENYLEPHRINE HYDROCHLORIDE 100 MCG: 10 INJECTION INTRAVENOUS at 09:32

## 2024-11-19 RX ADMIN — HYDROMORPHONE HYDROCHLORIDE 0.2 MG: 2 INJECTION INTRAMUSCULAR; INTRAVENOUS; SUBCUTANEOUS at 11:23

## 2024-11-19 RX ADMIN — ROCURONIUM BROMIDE 20 MG: 10 INJECTION, SOLUTION INTRAVENOUS at 08:51

## 2024-11-19 RX ADMIN — PHENYLEPHRINE HYDROCHLORIDE 100 MCG: 10 INJECTION INTRAVENOUS at 10:14

## 2024-11-19 RX ADMIN — ACETAMINOPHEN 650 MG: 325 TABLET ORAL at 20:00

## 2024-11-19 RX ADMIN — SODIUM CHLORIDE: 9 INJECTION, SOLUTION INTRAVENOUS at 15:48

## 2024-11-19 RX ADMIN — ROCURONIUM BROMIDE 50 MG: 10 INJECTION, SOLUTION INTRAVENOUS at 08:04

## 2024-11-19 RX ADMIN — PHENYLEPHRINE HYDROCHLORIDE 200 MCG: 10 INJECTION INTRAVENOUS at 11:29

## 2024-11-19 RX ADMIN — GLYCOPYRROLATE 0.2 MG: 0.2 INJECTION INTRAMUSCULAR; INTRAVENOUS at 08:38

## 2024-11-19 RX ADMIN — PHENYLEPHRINE HYDROCHLORIDE 100 MCG: 10 INJECTION INTRAVENOUS at 08:16

## 2024-11-19 RX ADMIN — CEFAZOLIN SODIUM 2 G: 1 INJECTION, POWDER, FOR SOLUTION INTRAMUSCULAR; INTRAVENOUS at 08:20

## 2024-11-19 RX ADMIN — SODIUM CHLORIDE, SODIUM LACTATE, POTASSIUM CHLORIDE, AND CALCIUM CHLORIDE: 600; 310; 30; 20 INJECTION, SOLUTION INTRAVENOUS at 09:58

## 2024-11-19 RX ADMIN — SODIUM CHLORIDE, SODIUM LACTATE, POTASSIUM CHLORIDE, AND CALCIUM CHLORIDE: 600; 310; 30; 20 INJECTION, SOLUTION INTRAVENOUS at 07:50

## 2024-11-19 RX ADMIN — PHENYLEPHRINE HYDROCHLORIDE 100 MCG: 10 INJECTION INTRAVENOUS at 11:19

## 2024-11-19 RX ADMIN — ESMOLOL HYDROCHLORIDE 20 MG: 10 INJECTION, SOLUTION INTRAVENOUS at 12:28

## 2024-11-19 RX ADMIN — KETOROLAC TROMETHAMINE 30 MG: 30 INJECTION, SOLUTION INTRAMUSCULAR at 20:04

## 2024-11-19 RX ADMIN — EPHEDRINE SULFATE 5 MG: 5 INJECTION INTRAVENOUS at 11:38

## 2024-11-19 RX ADMIN — ONDANSETRON 4 MG: 2 INJECTION INTRAMUSCULAR; INTRAVENOUS at 08:23

## 2024-11-19 RX ADMIN — PHENYLEPHRINE HYDROCHLORIDE 200 MCG: 10 INJECTION INTRAVENOUS at 12:02

## 2024-11-19 RX ADMIN — TRANEXAMIC ACID 1000 MG: 100 INJECTION, SOLUTION INTRAVENOUS at 11:55

## 2024-11-19 RX ADMIN — LIDOCAINE HYDROCHLORIDE 60 MG: 20 INJECTION, SOLUTION EPIDURAL; INFILTRATION; INTRACAUDAL; PERINEURAL at 08:03

## 2024-11-19 RX ADMIN — HYDROMORPHONE HYDROCHLORIDE 0.5 MG: 2 INJECTION INTRAMUSCULAR; INTRAVENOUS; SUBCUTANEOUS at 14:26

## 2024-11-19 RX ADMIN — Medication 10 MG: at 10:00

## 2024-11-19 RX ADMIN — PHENYLEPHRINE HYDROCHLORIDE 200 MCG: 10 INJECTION INTRAVENOUS at 10:29

## 2024-11-19 RX ADMIN — PHENYLEPHRINE HYDROCHLORIDE 100 MCG: 10 INJECTION INTRAVENOUS at 08:35

## 2024-11-19 RX ADMIN — HYDROMORPHONE HYDROCHLORIDE 0.5 MG: 1 INJECTION, SOLUTION INTRAMUSCULAR; INTRAVENOUS; SUBCUTANEOUS at 21:18

## 2024-11-19 RX ADMIN — PHENYLEPHRINE HYDROCHLORIDE 200 MCG: 10 INJECTION INTRAVENOUS at 10:20

## 2024-11-19 RX ADMIN — PHENYLEPHRINE HYDROCHLORIDE 200 MCG: 10 INJECTION INTRAVENOUS at 09:50

## 2024-11-19 RX ADMIN — POLYETHYLENE GLYCOL 3350 17 G: 17 POWDER, FOR SOLUTION ORAL at 15:23

## 2024-11-19 RX ADMIN — PHENYLEPHRINE HYDROCHLORIDE 100 MCG: 10 INJECTION INTRAVENOUS at 10:52

## 2024-11-19 RX ADMIN — HYDROMORPHONE HYDROCHLORIDE 0.5 MG: 2 INJECTION INTRAMUSCULAR; INTRAVENOUS; SUBCUTANEOUS at 13:39

## 2024-11-19 RX ADMIN — PHENYLEPHRINE HYDROCHLORIDE 200 MCG: 10 INJECTION INTRAVENOUS at 11:38

## 2024-11-19 RX ADMIN — DEXAMETHASONE SODIUM PHOSPHATE 10 MG: 10 INJECTION INTRAMUSCULAR; INTRAVENOUS at 08:23

## 2024-11-19 RX ADMIN — PROPOFOL 30 MG: 10 INJECTION, EMULSION INTRAVENOUS at 11:50

## 2024-11-19 RX ADMIN — MEDROXYPROGESTERONE ACETATE 2.5 MG: 2.5 TABLET ORAL at 21:15

## 2024-11-19 RX ADMIN — PHENYLEPHRINE HYDROCHLORIDE 100 MCG: 10 INJECTION INTRAVENOUS at 10:01

## 2024-11-19 RX ADMIN — ACETAMINOPHEN 1000 MG: 500 TABLET, FILM COATED ORAL at 06:12

## 2024-11-19 RX ADMIN — ROCURONIUM BROMIDE 20 MG: 10 INJECTION, SOLUTION INTRAVENOUS at 09:46

## 2024-11-19 RX ADMIN — PHENYLEPHRINE HYDROCHLORIDE 100 MCG: 10 INJECTION INTRAVENOUS at 08:25

## 2024-11-19 RX ADMIN — SUGAMMADEX 200 MG: 100 INJECTION, SOLUTION INTRAVENOUS at 12:17

## 2024-11-19 RX ADMIN — TRANEXAMIC ACID 1300 MG: 650 TABLET ORAL at 20:00

## 2024-11-19 RX ADMIN — MIDAZOLAM 2 MG: 1 INJECTION INTRAMUSCULAR; INTRAVENOUS at 07:50

## 2024-11-19 RX ADMIN — TRANEXAMIC ACID 1000 MG: 100 INJECTION, SOLUTION INTRAVENOUS at 08:30

## 2024-11-19 RX ADMIN — PHENYLEPHRINE HYDROCHLORIDE 200 MCG: 10 INJECTION INTRAVENOUS at 11:50

## 2024-11-19 RX ADMIN — HYDROMORPHONE HYDROCHLORIDE 0.5 MG: 2 INJECTION INTRAMUSCULAR; INTRAVENOUS; SUBCUTANEOUS at 14:01

## 2024-11-19 ASSESSMENT — PAIN SCALES - GENERAL
PAINLEVEL_OUTOF10: 7
PAINLEVEL_OUTOF10: 7
PAINLEVEL_OUTOF10: 0
PAINLEVEL_OUTOF10: 7
PAINLEVEL_OUTOF10: 7
PAINLEVEL_OUTOF10: 9
PAINLEVEL_OUTOF10: 0
PAINLEVEL_OUTOF10: 3
PAINLEVEL_OUTOF10: 7
PAINLEVEL_OUTOF10: 0

## 2024-11-19 ASSESSMENT — PAIN DESCRIPTION - FREQUENCY
FREQUENCY: CONTINUOUS
FREQUENCY: CONTINUOUS

## 2024-11-19 ASSESSMENT — PAIN DESCRIPTION - ONSET
ONSET: GRADUAL
ONSET: ON-GOING

## 2024-11-19 ASSESSMENT — PAIN - FUNCTIONAL ASSESSMENT
PAIN_FUNCTIONAL_ASSESSMENT: NONE - DENIES PAIN
PAIN_FUNCTIONAL_ASSESSMENT: PREVENTS OR INTERFERES SOME ACTIVE ACTIVITIES AND ADLS
PAIN_FUNCTIONAL_ASSESSMENT: PREVENTS OR INTERFERES WITH ALL ACTIVE AND SOME PASSIVE ACTIVITIES

## 2024-11-19 ASSESSMENT — PAIN DESCRIPTION - ORIENTATION
ORIENTATION: ANTERIOR;POSTERIOR
ORIENTATION: ANTERIOR;POSTERIOR

## 2024-11-19 ASSESSMENT — PAIN DESCRIPTION - DESCRIPTORS
DESCRIPTORS: ACHING
DESCRIPTORS: THROBBING;STABBING
DESCRIPTORS: ACHING;DISCOMFORT

## 2024-11-19 ASSESSMENT — PAIN DESCRIPTION - LOCATION
LOCATION: ABDOMEN;BACK
LOCATION: ABDOMEN;BACK
LOCATION: BACK

## 2024-11-19 ASSESSMENT — PAIN DESCRIPTION - PAIN TYPE
TYPE: SURGICAL PAIN
TYPE: SURGICAL PAIN

## 2024-11-19 NOTE — PERIOP NOTE
TRANSFER - OUT REPORT:    Verbal report given to SONNY Jordan on Tameka Johnson  being transferred to [facility] for routine post-op       Report consisted of patient’s Situation, Background, Assessment and   Recommendations(SBAR).     Information from the following report(s) Adult Overview, Surgery Report, Intake/Output, and Recent Results was reviewed with the receiving nurse.    Lines:   Peripheral IV 11/19/24 Left;Posterior Hand (Active)   Site Assessment Clean, dry & intact 11/19/24 1405   Line Care Connections checked and tightened 11/19/24 1405   Phlebitis Assessment No symptoms 11/19/24 1405   Infiltration Assessment 0 11/19/24 1405   Dressing Status Clean, dry & intact 11/19/24 1405   Dressing Type Transparent 11/19/24 1405       Peripheral IV 11/19/24 Right;Posterior Hand (Active)   Site Assessment Clean, dry & intact 11/19/24 1405   Line Care Connections checked and tightened 11/19/24 1405   Phlebitis Assessment No symptoms 11/19/24 1405   Infiltration Assessment 0 11/19/24 1405   Dressing Status Clean, dry & intact 11/19/24 1405   Dressing Type Transparent 11/19/24 1405        Opportunity for questions and clarification was provided.      Patient transported with:   O2 @ 3 liters    VTE prophylaxis orders have been written for Tameka Johnson.    Patient and family given floor number and nurses name.

## 2024-11-19 NOTE — ANESTHESIA POSTPROCEDURE EVALUATION
Department of Anesthesiology  Postprocedure Note    Patient: Tameka Johnson  MRN: [id number]  YOB: 1960  Date of evaluation: 11/19/2024    Procedure Summary       Date: 11/19/24 Room / Location: [facility] MAIN OR  / [facility] MAIN OR    Anesthesia Start: 0745 Anesthesia Stop: 1239    Procedures:       ERAS\L5-S1 anterior lumbar interbody fusion with interbody spacer/allograft      ERAS/L5-S1 laminectomy and fusion with allograft and instrumentation Diagnosis:       Spinal stenosis, lumbar region, with neurogenic claudication      Spondylolisthesis of lumbar region      Lumbar radiculopathy      (Spinal stenosis, lumbar region, with neurogenic claudication [M48.062])      (Spondylolisthesis of lumbar region [M43.16])      (Lumbar radiculopathy [M54.16])    Providers: Johnathan Lowe MD Responsible Provider: Bernardo Esparza MD    Anesthesia Type: general ASA Status: 2            Anesthesia Type: No value filed.    Tanya Phase I: Tanya Score: 9    Tanya Phase II:      Anesthesia Post Evaluation    Patient location during evaluation: PACU  Patient participation: complete - patient participated  Level of consciousness: awake and alert  Airway patency: patent  Nausea & Vomiting: no nausea and no vomiting  Cardiovascular status: hemodynamically stable  Respiratory status: acceptable, nonlabored ventilation and spontaneous ventilation  Hydration status: euvolemic  Comments: BP (!) 148/86   Pulse (!) 111   Temp 98.6 °F (37 °C) (Temporal)   Resp 18   Ht 1.524 m (5')   Wt 81.9 kg (180 lb 9.6 oz)   SpO2 96%   BMI 35.27 kg/m²     Multimodal analgesia pain management approach  Pain management: adequate and satisfactory to patient    No notable events documented.

## 2024-11-19 NOTE — OP NOTE
[facility] [address]   [phone]    OPERATIVE REPORT    Patient ID:Tameka Johnson  [id number]  1960  64 y.o.    DATE OF SURGERY: 11/19/2024    SURGEON: Johnathan Tyson MD    CO-SURGEON (ANTERIOR PORTION): Bishnu Smith MD (Vascular surgeon)    PREOPERATIVE DIAGNOSIS: Lumbar stenosis and spondylolisthesis    POSTOPERATIVE DIAGNOSIS: Lumbar stenosis and spondylolisthesis    PROCEDURE:    Anterior  abdominal incision - (Performed as co-surgeon with Dr. Smith)  1.  L5 - S1 anterior lumbar arthrodesis (CPT 29497-74)   2. Insertion biomechanical device L5 through S1 (CPT 56731)    Posterior incision - (Performed without a co-surgeon)  3. Lumbar laminectomy  L5 through S1  with bilateral foraminotomies. (CPT 93213, 26345)  4. Lumbar posterolateral fusion  L5 through S1 . (CPT 09191)  5. Instrumentation  L5 through S1 . (CPT 15489)  6. Allograft (CPT 54923)      ANESTHESIA: General.    ESTIMATED BLOOD LOSS: 200 ml    POSTOPERATIVE CONDITION: Stable.    INTRAOPERATIVE COMPLICATIONS: None.    IMPLANTS:   Implant Name Type Inv. Item Serial No.  Lot No. LRB No. Used Action   ALLOGRAFT BNE CHIP 1-4 MM 15 CC CRUSH CANC - [id number]  ALLOGRAFT BNE CHIP 1-4 MM 15 CC CRUSH CANC  [employer] [facility] [id number] N/A 1 Implanted   PUTTY BIO DBM 10 ML W CHIPS - [id number]  PUTTY BIO DBM 10 ML W CHIPS  [employer] [facility] [id number] N/A 1 Implanted   PUTTY BIO DBM 10 ML W CHIPS - [id number]  PUTTY BIO DBM 10 ML W CHIPS  [employer] [facility] [id number] N/A 1 Implanted   GRAFT BNE LG - [id number]  GRAFT BNE LG [id number] BIOLOGICA  N/A 1 Implanted   CAGE SPNL ANTR LUMBAR 10 [id number] MM STANDALONE [place] - [id number]  CAGE SPNL ANTR LUMBAR 10 [id number] MM STANDALONE [place]  [employer] SPINE [facility] [id number] N/A 1 Implanted   SCREW SPNL ANTR LUMBAR 5X20 MM STANDALONE [place] - [id number]  SCREW SPNL ANTR LUMBAR 5X20

## 2024-11-20 ENCOUNTER — APPOINTMENT (OUTPATIENT)
Dept: CT IMAGING | Age: 64
DRG: 455 | End: 2024-11-20
Attending: ORTHOPAEDIC SURGERY
Payer: OTHER GOVERNMENT

## 2024-11-20 PROCEDURE — 2500000003 HC RX 250 WO HCPCS: Performed by: ORTHOPAEDIC SURGERY

## 2024-11-20 PROCEDURE — 1100000000 HC RM PRIVATE

## 2024-11-20 PROCEDURE — 97165 OT EVAL LOW COMPLEX 30 MIN: CPT

## 2024-11-20 PROCEDURE — 72131 CT LUMBAR SPINE W/O DYE: CPT

## 2024-11-20 PROCEDURE — 6360000002 HC RX W HCPCS: Performed by: ORTHOPAEDIC SURGERY

## 2024-11-20 PROCEDURE — 97161 PT EVAL LOW COMPLEX 20 MIN: CPT

## 2024-11-20 PROCEDURE — 97535 SELF CARE MNGMENT TRAINING: CPT

## 2024-11-20 PROCEDURE — 97112 NEUROMUSCULAR REEDUCATION: CPT

## 2024-11-20 PROCEDURE — 97530 THERAPEUTIC ACTIVITIES: CPT

## 2024-11-20 PROCEDURE — 6370000000 HC RX 637 (ALT 250 FOR IP): Performed by: ORTHOPAEDIC SURGERY

## 2024-11-20 PROCEDURE — 2580000003 HC RX 258: Performed by: ORTHOPAEDIC SURGERY

## 2024-11-20 RX ORDER — CITALOPRAM HYDROBROMIDE 10 MG/1
10 TABLET ORAL
Status: DISCONTINUED | OUTPATIENT
Start: 2024-11-20 | End: 2024-11-22 | Stop reason: HOSPADM

## 2024-11-20 RX ADMIN — TRANEXAMIC ACID 1300 MG: 650 TABLET ORAL at 08:13

## 2024-11-20 RX ADMIN — MEDROXYPROGESTERONE ACETATE 2.5 MG: 2.5 TABLET ORAL at 21:07

## 2024-11-20 RX ADMIN — SODIUM CHLORIDE, PRESERVATIVE FREE 10 ML: 5 INJECTION INTRAVENOUS at 08:22

## 2024-11-20 RX ADMIN — BISACODYL 5 MG: 5 TABLET, COATED ORAL at 08:13

## 2024-11-20 RX ADMIN — ACETAMINOPHEN 650 MG: 325 TABLET ORAL at 13:50

## 2024-11-20 RX ADMIN — BUPROPION HYDROCHLORIDE 150 MG: 150 TABLET, EXTENDED RELEASE ORAL at 08:13

## 2024-11-20 RX ADMIN — ACETAMINOPHEN 650 MG: 325 TABLET ORAL at 05:30

## 2024-11-20 RX ADMIN — ACETAMINOPHEN 650 MG: 325 TABLET ORAL at 08:13

## 2024-11-20 RX ADMIN — PANTOPRAZOLE SODIUM 40 MG: 40 TABLET, DELAYED RELEASE ORAL at 05:55

## 2024-11-20 RX ADMIN — SODIUM CHLORIDE, PRESERVATIVE FREE 5 ML: 5 INJECTION INTRAVENOUS at 21:05

## 2024-11-20 RX ADMIN — POLYETHYLENE GLYCOL 3350 17 G: 17 POWDER, FOR SOLUTION ORAL at 08:14

## 2024-11-20 RX ADMIN — CITALOPRAM HYDROBROMIDE 10 MG: 10 TABLET ORAL at 21:07

## 2024-11-20 RX ADMIN — LEVOTHYROXINE SODIUM 150 MCG: 0.07 TABLET ORAL at 05:55

## 2024-11-20 RX ADMIN — FAMOTIDINE 20 MG: 20 TABLET, FILM COATED ORAL at 21:07

## 2024-11-20 RX ADMIN — HYDROMORPHONE HYDROCHLORIDE 0.5 MG: 1 INJECTION, SOLUTION INTRAMUSCULAR; INTRAVENOUS; SUBCUTANEOUS at 21:07

## 2024-11-20 RX ADMIN — TRAMADOL HYDROCHLORIDE 50 MG: 50 TABLET ORAL at 17:24

## 2024-11-20 RX ADMIN — ACETAMINOPHEN 650 MG: 325 TABLET ORAL at 21:07

## 2024-11-20 RX ADMIN — KETOROLAC TROMETHAMINE 30 MG: 30 INJECTION, SOLUTION INTRAMUSCULAR at 08:14

## 2024-11-20 RX ADMIN — KETOROLAC TROMETHAMINE 30 MG: 30 INJECTION, SOLUTION INTRAMUSCULAR at 05:30

## 2024-11-20 ASSESSMENT — PAIN DESCRIPTION - FREQUENCY: FREQUENCY: CONTINUOUS

## 2024-11-20 ASSESSMENT — PAIN DESCRIPTION - PAIN TYPE: TYPE: SURGICAL PAIN

## 2024-11-20 ASSESSMENT — PAIN DESCRIPTION - LOCATION
LOCATION: LEG;BACK
LOCATION: ABDOMEN;BACK
LOCATION: BACK;LEG

## 2024-11-20 ASSESSMENT — PAIN DESCRIPTION - ORIENTATION
ORIENTATION: ANTERIOR;POSTERIOR
ORIENTATION: LEFT;MID
ORIENTATION: LEFT

## 2024-11-20 ASSESSMENT — PAIN - FUNCTIONAL ASSESSMENT
PAIN_FUNCTIONAL_ASSESSMENT: PREVENTS OR INTERFERES SOME ACTIVE ACTIVITIES AND ADLS
PAIN_FUNCTIONAL_ASSESSMENT: PREVENTS OR INTERFERES SOME ACTIVE ACTIVITIES AND ADLS

## 2024-11-20 ASSESSMENT — PAIN DESCRIPTION - DESCRIPTORS
DESCRIPTORS: ACHING;DISCOMFORT
DESCRIPTORS: ACHING;DISCOMFORT

## 2024-11-20 ASSESSMENT — PAIN SCALES - GENERAL
PAINLEVEL_OUTOF10: 6
PAINLEVEL_OUTOF10: 0
PAINLEVEL_OUTOF10: 9
PAINLEVEL_OUTOF10: 6
PAINLEVEL_OUTOF10: 0

## 2024-11-20 ASSESSMENT — PAIN DESCRIPTION - ONSET: ONSET: PROGRESSIVE

## 2024-11-20 NOTE — OP NOTE
VASCULAR SURGERY   [address] [place] [address] [address]  [phone] FAX: [phone]    Pre-Op diagnosis:    Lumbar stenosis and spondylolisthesis    Post-Op diagnosis:    Lumbar stenosis and spondylolisthesis     Surgeon: John Smith MD    Co-Surgeon: Johnathan Tyson MD    Procedure:   L5 - S1 anterior lumbar arthrodesis (CPT 74554-74)      Complications: None     EBL: 100cc     Anesthesia: General     Description of procedure: After informed consent was obtained the patient was brought to the operating room and placed in supine position.  Preoperative antibiotics were given.  Appropriate anesthesia was administered and endotracheal intubation was performed.  Timeout was performed confirming patient identity and procedure.  Patient was then prepped and draped in sterile fashion.  Midline incision was made from the umbilicus to pubic symphysis.  Dissection was carried down through the anterior fascia.  Left rectus muscle was then elevated anteriorly and retroperitoneal plane was established.  Peritoneum was then retracted the patient's right with dissection carried along anterior surface of the iliac artery.  Iliac vein was then mobilized along the medial aspect for adequate exposure of the vertebral disc.  Dr. Das then performed L5-S1 discectomy and insertion of L5-S1 implant, see his dictation for further details.  Hemostasis was adequate surgical Gelfoam was then placed adjacent to the spine.  Anterior fascia was then reapproximated with running #1 PDS.  Subcutaneous tissues were reapproximated with running 3-0 Vicryl.  Skin was then closed with running 4-0 Monocryl.  Prineo dressing was applied.  Patient was then placed in the prone position and Dr. Das performed posterior fusion.  See his dictation for further details.  Patient was awoken taken to PACU in stable condition.     Implants:  Implant Name Type Inv. Item Serial No.  Lot No. LRB No. Used

## 2024-11-20 NOTE — CARE COORDINATION
Chart reviewed by  for potential transition of care (SD) needs or concerns.  Pt is insured with pharmacy benefits and is established with a PCP.  Therapy evals complete with the recommendation for HH therapy and a RW at [facility].  Pt will have HH PT/OT services through [facility] at [place].  These services were arranged by the surgeon's office prior to admission.  Fixed wheel rolling walker ordered from [employer].  RW delivered to room.  Consent signed and faxed to their office.  No other SD needs or concerns identified or reported. Please notify/consult  if other SD needs arise.       11/20/24 1313   Service Assessment   Patient Orientation Alert and Oriented   Cognition Alert   History Provided By Patient;Medical Record   Primary Caregiver Self   Accompanied By/Relationship spouse   Support Systems Spouse/Significant Other;Children;Friends/Neighbors;[place];Family Members   Patient's Healthcare Decision Maker is: Legal Next of Kin   PCP Verified by CM Yes   Last Visit to PCP Within last 3 months  (10/21/2024)   Prior Functional Level Independent in ADLs/IADLs   Current Functional Level Independent in ADLs/IADLs   Can patient return to prior living arrangement Yes   Ability to make needs known: Good   Family able to assist with home care needs: Yes   Would you like for me to discuss the discharge plan with any other family members/significant others, and if so, who? No   Financial Resources Other (Comment); ([facility])  ([facility])   Community Resources None   Social/Functional History   Lives With Spouse   Type of Home House   Home Layout Two level;Able to Live on Main level with bedroom/bathroom;Performs ADL's on one level   Home Access Stairs to enter without rails   Entrance Stairs - Number of Steps 3   Bathroom Shower/Tub Tub/Shower unit   Bathroom Toilet Handicap height   Bathroom Equipment Shower chair   Home Equipment None   Receives Help From Family   ADL

## 2024-11-20 NOTE — THERAPY EVALUATION
ACUTE OCCUPATIONAL THERAPY GOALS:   (Developed with and agreed upon by patient and/or caregiver.)  1. Patient will verbalize and demonstrate understanding of spinal precautions with 100% accuracy during ADLs.   2. Patient will complete lower body bathing and dressing with MODIFIED INDEPENDENCE and adaptive equipment as needed.   3. Patient will complete functional transfers with MODIFIED INDEPENDENCE and adaptive equipment as needed.   4. Patient will complete toileting and toilet transfer with MODIFIED INDEPENDENCE.   5. Patient will complete functional mobility of household distances with MODIFIED INDEPENDENCE and adaptive equipment as needed.   6. Patient will demonstrate ability to log roll in bed with INDEPENDENCE and no verbal cues from therapist.   7. Patient will complete self-grooming ADL tasks at standing level with MODIFIED INDEPENDENCE and adaptive equipment as needed.    Timeframe: 7 visits      OCCUPATIONAL THERAPY Initial Assessment, Daily Note, and AM       OT Visit Days: 1  Acknowledge Orders  Time  OT Charge Capture  Rehab Caseload Tracker       Spinal Precautions (No Bending, No Lifting, No Twisting)    Tameka Johnson is a 64 y.o. female   PRIMARY DIAGNOSIS: Spinal stenosis, lumbar region, with neurogenic claudication  Spinal stenosis, lumbar region, with neurogenic claudication [M48.062]  Spondylolisthesis of lumbar region [M43.16]  Lumbar radiculopathy [M54.16]  S/P lumbar fusion [Z98.1]  Procedure(s) (LRB):  ERAS\L5-S1 anterior lumbar interbody fusion with interbody spacer/allograft (N/A)  ERAS/L5-S1 laminectomy and fusion with allograft and instrumentation (N/A)  1 Day Post-Op  Reason for Referral: Generalized Muscle Weakness (M62.81)  Other lack of cordination (R27.8)  Low Back Pain (M54.5)  Inpatient: Payor:  [facility] / Plan:  [facility] / Product Type: *No Product type* /     ASSESSMENT:     REHAB RECOMMENDATIONS:   Recommendation to date pending

## 2024-11-21 ENCOUNTER — APPOINTMENT (OUTPATIENT)
Dept: MRI IMAGING | Age: 64
DRG: 455 | End: 2024-11-21
Attending: ORTHOPAEDIC SURGERY
Payer: OTHER GOVERNMENT

## 2024-11-21 PROCEDURE — 97530 THERAPEUTIC ACTIVITIES: CPT

## 2024-11-21 PROCEDURE — 2580000003 HC RX 258: Performed by: ORTHOPAEDIC SURGERY

## 2024-11-21 PROCEDURE — 97535 SELF CARE MNGMENT TRAINING: CPT

## 2024-11-21 PROCEDURE — A9579 GAD-BASE MR CONTRAST NOS,1ML: HCPCS | Performed by: ORTHOPAEDIC SURGERY

## 2024-11-21 PROCEDURE — 6360000004 HC RX CONTRAST MEDICATION: Performed by: ORTHOPAEDIC SURGERY

## 2024-11-21 PROCEDURE — 1100000000 HC RM PRIVATE

## 2024-11-21 PROCEDURE — 6370000000 HC RX 637 (ALT 250 FOR IP): Performed by: ORTHOPAEDIC SURGERY

## 2024-11-21 PROCEDURE — 2500000003 HC RX 250 WO HCPCS: Performed by: ORTHOPAEDIC SURGERY

## 2024-11-21 PROCEDURE — 72158 MRI LUMBAR SPINE W/O & W/DYE: CPT

## 2024-11-21 RX ADMIN — GADOTERIDOL 17 ML: 279.3 INJECTION, SOLUTION INTRAVENOUS at 20:26

## 2024-11-21 RX ADMIN — POLYETHYLENE GLYCOL 3350 17 G: 17 POWDER, FOR SOLUTION ORAL at 09:12

## 2024-11-21 RX ADMIN — ACETAMINOPHEN 650 MG: 325 TABLET ORAL at 05:30

## 2024-11-21 RX ADMIN — ACETAMINOPHEN 650 MG: 325 TABLET ORAL at 21:21

## 2024-11-21 RX ADMIN — PANTOPRAZOLE SODIUM 40 MG: 40 TABLET, DELAYED RELEASE ORAL at 05:31

## 2024-11-21 RX ADMIN — SODIUM CHLORIDE, PRESERVATIVE FREE 10 ML: 5 INJECTION INTRAVENOUS at 09:12

## 2024-11-21 RX ADMIN — BUPROPION HYDROCHLORIDE 150 MG: 150 TABLET, EXTENDED RELEASE ORAL at 09:12

## 2024-11-21 RX ADMIN — FAMOTIDINE 20 MG: 20 TABLET, FILM COATED ORAL at 21:22

## 2024-11-21 RX ADMIN — ACETAMINOPHEN 650 MG: 325 TABLET ORAL at 15:07

## 2024-11-21 RX ADMIN — HYDROMORPHONE HYDROCHLORIDE 0.5 MG: 1 INJECTION, SOLUTION INTRAMUSCULAR; INTRAVENOUS; SUBCUTANEOUS at 21:21

## 2024-11-21 RX ADMIN — ACETAMINOPHEN 650 MG: 325 TABLET ORAL at 09:12

## 2024-11-21 RX ADMIN — LEVOTHYROXINE SODIUM 150 MCG: 0.07 TABLET ORAL at 05:31

## 2024-11-21 RX ADMIN — TRAMADOL HYDROCHLORIDE 100 MG: 50 TABLET, COATED ORAL at 09:16

## 2024-11-21 RX ADMIN — BISACODYL 5 MG: 5 TABLET, COATED ORAL at 09:12

## 2024-11-21 RX ADMIN — MEDROXYPROGESTERONE ACETATE 2.5 MG: 2.5 TABLET ORAL at 21:22

## 2024-11-21 RX ADMIN — CITALOPRAM HYDROBROMIDE 10 MG: 10 TABLET ORAL at 21:21

## 2024-11-21 RX ADMIN — SODIUM CHLORIDE, PRESERVATIVE FREE 10 ML: 5 INJECTION INTRAVENOUS at 21:22

## 2024-11-21 ASSESSMENT — PAIN DESCRIPTION - FREQUENCY: FREQUENCY: CONTINUOUS

## 2024-11-21 ASSESSMENT — PAIN SCALES - GENERAL
PAINLEVEL_OUTOF10: 7
PAINLEVEL_OUTOF10: 9
PAINLEVEL_OUTOF10: 0
PAINLEVEL_OUTOF10: 0

## 2024-11-21 ASSESSMENT — PAIN DESCRIPTION - ONSET: ONSET: ON-GOING

## 2024-11-21 ASSESSMENT — PAIN DESCRIPTION - ORIENTATION
ORIENTATION: LEFT;MID
ORIENTATION: POSTERIOR

## 2024-11-21 ASSESSMENT — PAIN DESCRIPTION - LOCATION
LOCATION: BACK;INCISION
LOCATION: BACK;LEG

## 2024-11-21 ASSESSMENT — PAIN DESCRIPTION - DESCRIPTORS: DESCRIPTORS: DISCOMFORT;CRUSHING

## 2024-11-21 ASSESSMENT — PAIN - FUNCTIONAL ASSESSMENT: PAIN_FUNCTIONAL_ASSESSMENT: PREVENTS OR INTERFERES SOME ACTIVE ACTIVITIES AND ADLS

## 2024-11-21 ASSESSMENT — PAIN DESCRIPTION - PAIN TYPE: TYPE: SURGICAL PAIN

## 2024-11-22 VITALS
HEIGHT: 60 IN | TEMPERATURE: 98.8 F | HEART RATE: 87 BPM | DIASTOLIC BLOOD PRESSURE: 90 MMHG | OXYGEN SATURATION: 97 % | WEIGHT: 180.6 LBS | SYSTOLIC BLOOD PRESSURE: 150 MMHG | RESPIRATION RATE: 23 BRPM | BODY MASS INDEX: 35.46 KG/M2

## 2024-11-22 PROCEDURE — 97116 GAIT TRAINING THERAPY: CPT

## 2024-11-22 PROCEDURE — 2500000003 HC RX 250 WO HCPCS: Performed by: ORTHOPAEDIC SURGERY

## 2024-11-22 PROCEDURE — 6370000000 HC RX 637 (ALT 250 FOR IP): Performed by: ORTHOPAEDIC SURGERY

## 2024-11-22 RX ADMIN — PANTOPRAZOLE SODIUM 40 MG: 40 TABLET, DELAYED RELEASE ORAL at 05:37

## 2024-11-22 RX ADMIN — ACETAMINOPHEN 650 MG: 325 TABLET ORAL at 10:26

## 2024-11-22 RX ADMIN — ACETAMINOPHEN 650 MG: 325 TABLET ORAL at 02:33

## 2024-11-22 RX ADMIN — LEVOTHYROXINE SODIUM 150 MCG: 0.07 TABLET ORAL at 05:36

## 2024-11-22 RX ADMIN — POLYETHYLENE GLYCOL 3350 17 G: 17 POWDER, FOR SOLUTION ORAL at 10:29

## 2024-11-22 RX ADMIN — TRAMADOL HYDROCHLORIDE 50 MG: 50 TABLET ORAL at 02:33

## 2024-11-22 RX ADMIN — BUPROPION HYDROCHLORIDE 150 MG: 150 TABLET, EXTENDED RELEASE ORAL at 10:26

## 2024-11-22 RX ADMIN — BISACODYL 5 MG: 5 TABLET, COATED ORAL at 10:26

## 2024-11-22 RX ADMIN — TRAMADOL HYDROCHLORIDE 100 MG: 50 TABLET, COATED ORAL at 10:26

## 2024-11-22 ASSESSMENT — PAIN DESCRIPTION - LOCATION
LOCATION: BACK
LOCATION: BACK;LEG

## 2024-11-22 ASSESSMENT — PAIN DESCRIPTION - FREQUENCY: FREQUENCY: CONTINUOUS

## 2024-11-22 ASSESSMENT — PAIN SCALES - GENERAL
PAINLEVEL_OUTOF10: 7
PAINLEVEL_OUTOF10: 6
PAINLEVEL_OUTOF10: 0

## 2024-11-22 ASSESSMENT — PAIN DESCRIPTION - ORIENTATION
ORIENTATION: LEFT
ORIENTATION: POSTERIOR

## 2024-11-22 ASSESSMENT — PAIN DESCRIPTION - DESCRIPTORS: DESCRIPTORS: DISCOMFORT

## 2024-11-22 ASSESSMENT — PAIN DESCRIPTION - PAIN TYPE: TYPE: SURGICAL PAIN

## 2024-11-22 ASSESSMENT — PAIN - FUNCTIONAL ASSESSMENT: PAIN_FUNCTIONAL_ASSESSMENT: PREVENTS OR INTERFERES SOME ACTIVE ACTIVITIES AND ADLS

## 2024-11-22 ASSESSMENT — PAIN DESCRIPTION - ONSET: ONSET: PROGRESSIVE

## 2024-11-22 NOTE — DISCHARGE SUMMARY
Discharge Summary    Patient ID:Tameka Johnson  [id number]  1960  64 y.o.    Admit date: 11/19/2024    Discharge date:11/22/2024    Admission Diagnoses: Spinal stenosis, lumbar region, with neurogenic claudication [M48.062]  Spondylolisthesis of lumbar region [M43.16]  Lumbar radiculopathy [M54.16]  S/P lumbar fusion [Z98.1]      Surgeon: Johnathan Lowe, *    Procedure: Procedure(s) with comments:  ERAS\L5-S1 anterior lumbar interbody fusion with interbody spacer/allograft - Dr Smith will be assisting  ERAS/L5-S1 laminectomy and fusion with allograft and instrumentation      Post Op complications: none    Hospital Course: Patient admitted to ortho floor. Antibiotics were given postop. SCD and fish hose were in place for DVT prophylaxis. Patient voided normally. Patient did not receive blood transfusion. Drain output diminished and was removed.  Patient tolerated pain medications and po diet. The dressing remained clean, dry, and intact.  Physical Therapy started on the day following surgery and progressed to ambulation without assistance.  At the time of discharge, had understanding of precautions needed following surgery.      Postoperative complications requiring an extended length of stay: None    Discharged to: Home    New Medications:      Medication List        START taking these medications      traMADol 50 MG tablet  Commonly known as: Ultram  Take 1 tablet by mouth every 6 hours as needed for Pain for up to 7 days. Intended supply: 7 days. Take lowest dose possible to manage pain Max Daily Amount: 200 mg            CONTINUE taking these medications      ALBUTEROL IN     buPROPion 150 MG extended release tablet  Commonly known as: WELLBUTRIN SR     celecoxib 200 MG capsule  Commonly known as: CeleBREX  Take 1 capsule by mouth 2 times daily as needed for Pain     citalopram 10 MG tablet  Commonly known as: CELEXA     esomeprazole 40 MG delayed release capsule  Commonly known as:

## 2024-11-22 NOTE — PLAN OF CARE
Problem: Safety - Adult  Goal: Free from fall injury  Outcome: Progressing  Flowsheets (Taken 11/22/2024 1204)  Free From Fall Injury: Instruct family/caregiver on patient safety     Problem: Pain  Goal: Verbalizes/displays adequate comfort level or baseline comfort level  Outcome: Progressing     Problem: Discharge Planning  Goal: Discharge to home or other facility with appropriate resources  Outcome: Progressing  Flowsheets (Taken 11/22/2024 0800)  Discharge to home or other facility with appropriate resources: Identify barriers to discharge with patient and caregiver     Problem: ABCDS Injury Assessment  Goal: Absence of physical injury  Outcome: Progressing  Flowsheets (Taken 11/22/2024 1204)  Absence of Physical Injury: Implement safety measures based on patient assessment     Problem: Neurosensory - Adult  Goal: Achieves stable or improved neurological status  Outcome: Progressing  Flowsheets (Taken 11/22/2024 0800)  Achieves stable or improved neurological status: Assess for and report changes in neurological status     Problem: Respiratory - Adult  Goal: Achieves optimal ventilation and oxygenation  Outcome: Progressing  Flowsheets (Taken 11/22/2024 0800)  Achieves optimal ventilation and oxygenation: Assess for changes in respiratory status     Problem: Cardiovascular - Adult  Goal: Maintains optimal cardiac output and hemodynamic stability  Outcome: Progressing  Flowsheets (Taken 11/22/2024 0800)  Maintains optimal cardiac output and hemodynamic stability: Monitor blood pressure and heart rate     Problem: Skin/Tissue Integrity - Adult  Goal: Skin integrity remains intact  Outcome: Progressing  Flowsheets  Taken 11/22/2024 1204  Skin Integrity Remains Intact:   Monitor for areas of redness and/or skin breakdown   Assess vascular access sites hourly  Taken 11/22/2024 0800  Skin Integrity Remains Intact:   Monitor for areas of redness and/or skin breakdown   Assess vascular access sites hourly

## 2024-11-22 NOTE — PROGRESS NOTES
4 Eyes Skin Assessment     NAME:  Tameka Johnson  YOB: 1960  MEDICAL RECORD NUMBER:  [id number]    The patient is being assessed for  Admission    I agree that at least one RN has performed a thorough Head to Toe Skin Assessment on the patient. ALL assessment sites listed below have been assessed.      Areas assessed by both nurses:    Head, Face, Ears, Shoulders, Back, Chest, Arms, Elbows, Hands, Sacrum. Buttock, Coccyx, Ischium, Legs. Feet and Heels, and Under Medical Devices         Does the Patient have a Wound? No noted wound(s)       Liam Prevention initiated by RN: Yes  Wound Care Orders initiated by RN: No    Pressure Injury (Stage 3,4, Unstageable, DTI, NWPT, and Complex wounds) if present, place Wound referral order by RN under : No    New Ostomies, if present place, Ostomy referral order under : No     Nurse 1 eSignature: Electronically signed by Nikki Washburn RN on 11/19/24 at 4:28 PM EST    **SHARE this note so that the co-signing nurse can place an eSignature**    Nurse 2 eSignature: Electronically signed by CARINE TRENT RN on 11/19/24 at 6:03 PM EST   
ACUTE PHYSICAL THERAPY GOALS:   (Developed with and agreed upon by patient and/or caregiver.)  Pt will perform bed mobility with Betina while appropriately maintaining spinal precautions in 7 therapy sessions.  Pt will perform sit-to-stand/ stand-to-sit transfers Betina in 7 therapy sessions.  Pt will ambulate 250 ft Betina with use of LRAD/no device and breaks as needed in 7 therapy sessions.  Pt will tolerate 25 minutes of standing activity with LRAD/no device in 7 therapy sessions.  Pt will negotiate up and down 3 steps Betina with use of a handrail in 7 therapy sessions.  Pt will perform standing dynamic balance activities with minimal postural sway in 7 therapy sessions.  Pt will tolerate multiple sets and reps of BLE exercises in 7 therapy sessions.  Pt will accurately verbalize and demonstrate spinal precautions in 7 therapy sessions.     PHYSICAL THERAPY Initial Assessment and AM  (Link to Caseload Tracking: PT Visit Days : 1  Acknowledge Orders  Time In/Out  PT Charge Capture  Rehab Caseload Tracker    Spinal precautions    Tameka Johnson is a 64 y.o. female   PRIMARY DIAGNOSIS: Spinal stenosis, lumbar region, with neurogenic claudication  Spinal stenosis, lumbar region, with neurogenic claudication [M48.062]  Spondylolisthesis of lumbar region [M43.16]  Lumbar radiculopathy [M54.16]  S/P lumbar fusion [Z98.1]  Procedure(s) (LRB):  ERAS\L5-S1 anterior lumbar interbody fusion with interbody spacer/allograft (N/A)  ERAS/L5-S1 laminectomy and fusion with allograft and instrumentation (N/A)  1 Day Post-Op  Reason for Referral: Other abnormalities of gait and mobility (R26.89)  Inpatient: Payor:  [facility] / Plan:  [facility] / Product Type: *No Product type* /     ASSESSMENT:     REHAB RECOMMENDATIONS:   Recommendation to date pending progress:  Setting:  Home Health Therapy    Equipment:    Rolling Walker     ASSESSMENT:  Ms. Johnson is a 64 y.o. female presenting to PT s/p the above procedure. 
ACUTE PHYSICAL THERAPY GOALS:   (Developed with and agreed upon by patient and/or caregiver.)  Pt will perform bed mobility with Betina while appropriately maintaining spinal precautions in 7 therapy sessions.  Pt will perform sit-to-stand/ stand-to-sit transfers Betina in 7 therapy sessions.  Pt will ambulate 250 ft Betina with use of LRAD/no device and breaks as needed in 7 therapy sessions.  Pt will tolerate 25 minutes of standing activity with LRAD/no device in 7 therapy sessions.  Pt will negotiate up and down 3 steps Betina with use of a handrail in 7 therapy sessions.  Pt will perform standing dynamic balance activities with minimal postural sway in 7 therapy sessions.  Pt will tolerate multiple sets and reps of BLE exercises in 7 therapy sessions.  Pt will accurately verbalize and demonstrate spinal precautions in 7 therapy sessions.     PHYSICAL THERAPY: Daily Note PM   (Link to Caseload Tracking: PT Visit Days : 1  Time In/Out PT Charge Capture  Rehab Caseload Tracker  Orders    Spinal precautions     Tameka Johnson is a 64 y.o. female   PRIMARY DIAGNOSIS: Spinal stenosis, lumbar region, with neurogenic claudication  Spinal stenosis, lumbar region, with neurogenic claudication [M48.062]  Spondylolisthesis of lumbar region [M43.16]  Lumbar radiculopathy [M54.16]  S/P lumbar fusion [Z98.1]  Procedure(s) (LRB):  ERAS\L5-S1 anterior lumbar interbody fusion with interbody spacer/allograft (N/A)  ERAS/L5-S1 laminectomy and fusion with allograft and instrumentation (N/A)  1 Day Post-Op  Inpatient: Payor:  [facility] / Plan:  [facility] / Product Type: *No Product type* /     ASSESSMENT:     REHAB RECOMMENDATIONS:   Recommendation to date pending progress:  Setting:  Home Health Therapy    Equipment:    Rolling Walker     ASSESSMENT:  Ms. Johnson was found supine in bed and agreeable to PT. Spinal precautions were reviewed with pt again this session and pt accurately recited all 3/3 precautions. 
Dr. Lang reviewed chart, states \"needs scop patch and Emend, I will call it in.\" No order received. Ok to proceed. Patient notified and verbalized understanding.    
Explained discharge orders with pt and spouse. All questions answered. Pt. Left floor via wheelchair and left with spouse in personal vehicle.   
Met with patient and her . Patient states she is concerned with lack of feeling in her foot.  gave words of encouragement and prayed with them. Please reach out if patient needs further spiritual care.  
ORTHO PROGRESS NOTE    2024    Admit Date: 2024  Post Op day: 1 Day Post-Op      Subjective:     Tameka Johnson is a patient who is now 1 Day Post-Op  and has complaints of left foot drop and lateral leg numbness .       Objective:     PT/OT:    Progressing. Ambulating in holley with walker.      Vital Signs:    No data found.  Temp (24hrs), Av.5 °F (36.9 °C), Min:97.9 °F (36.6 °C), Max:98.8 °F (37.1 °C)      LAB:    No results for input(s): \"HGB\", \"WBC\", \"PLT\" in the last 72 hours.    Physical Exam:    Awake and in no acute distress.  Mood and affect appropriate.  Respirations unlabored and no evidence cyanosis.  Calves nontender.  Abdomen soft and nontender.  Dressing clean/dry  Left ADF and EHL weakness.  0/5     Assessment:      1 Day Post-Op STATUS POST Procedure(s) with comments:  ERAS\L5-S1 anterior lumbar interbody fusion with interbody spacer/allograft - Dr Smith will be assisting  ERAS/L5-S1 laminectomy and fusion with allograft and instrumentation      Plan:     Persistent left foot drop, ? Traction neuropraxia, ? Positional peroneal neuropathy? [employer] hardware. Will get CT and MRI to further assess for modifiable cause.  Continue PT/OT        Signed By: VON GONZALEZ MD        
ORTHO PROGRESS NOTE    2024    Admit Date: 2024  Post Op day: 2 Days Post-Op      Subjective:     Tameka Johnson is a patient who is now 2 Days Post-Op  and has complaints of left foot drop and lateral leg numbness .       Objective:     PT/OT:    Progressing. Ambulating in holley with walker.      Vital Signs:    Patient Vitals for the past 8 hrs:   BP Temp Temp src Pulse Resp SpO2   24 0709 123/72 99.1 °F (37.3 °C) Oral 87 16 97 %     Temp (24hrs), Av °F (37.2 °C), Min:98.8 °F (37.1 °C), Max:99.1 °F (37.3 °C)      LAB:    No results for input(s): \"HGB\", \"WBC\", \"PLT\" in the last 72 hours.    Physical Exam:    Awake and in no acute distress.  Mood and affect appropriate.  Respirations unlabored and no evidence cyanosis.  Calves nontender.  Abdomen soft and nontender.  Dressing clean/dry  Left ADF and EHL weakness.  0/5     Assessment:      2 Days Post-Op STATUS POST Procedure(s) with comments:  ERAS\L5-S1 anterior lumbar interbody fusion with interbody spacer/allograft - Dr Smith will be assisting  ERAS/L5-S1 laminectomy and fusion with allograft and instrumentation      Plan:     Continue PT/OT  Awaiting MRI         Signed By: Bernardo Razo MD        
ORTHO PROGRESS NOTE    2024    Admit Date: 2024  Post Op day: 3 Days Post-Op      Subjective:     Tameka Johnson is a patient who is now 3 Days Post-Op  and has complaints of left foot drop and lateral leg numbness .       Objective:     PT/OT:    Progressing. Ambulating in holley with walker.      Vital Signs:    Patient Vitals for the past 8 hrs:   BP Temp Temp src Pulse Resp SpO2   24 0713 (!) 150/90 98.8 °F (37.1 °C) Oral 87 18 97 %   24 0340 (!) 118/59 98.2 °F (36.8 °C) Oral 91 16 93 %   24 0228 103/60 -- -- 94 -- --     Temp (24hrs), Av.5 °F (36.9 °C), Min:98.2 °F (36.8 °C), Max:98.8 °F (37.1 °C)      LAB:    No results for input(s): \"HGB\", \"WBC\", \"PLT\" in the last 72 hours.    Physical Exam:    Awake and in no acute distress.  Mood and affect appropriate.  Respirations unlabored and no evidence cyanosis.  Calves nontender.  Abdomen soft and nontender.  Dressing clean/dry  Left ADF and EHL weakness.  0/5     Assessment:      3 Days Post-Op STATUS POST Procedure(s) with comments:  ERAS\L5-S1 anterior lumbar interbody fusion with interbody spacer/allograft - Dr Smith will be assisting  ERAS/L5-S1 laminectomy and fusion with allograft and instrumentation      Plan:    -Continue PT/OT  -CT shows that instrumentation is in appropriate alignment  -MRI report has not been released  -I discussed with the patient that as long as the MRI shows postoperative changes that we would likely proceed with discharge today.  I did inform her that she will see Dr. Tyson in clinic earlier than normal to get a prescription for an AFO brace.        Signed By: Bernardo Razo MD        
PT was in bed with Spouse at bedside. CH introduced self. PT expressed appreciation for  Ann. PT shared  briefly about health and hospitalization including concerns and hopes. PT expressed trust in Daniel and comfort in Carey and Prayer. CH offered prayer. CH checked for unmet needs and offered support.     . Charline Banks M.Div.    
Please Complete MRI Screening Form with Signatures EAL  
TRANSFER - IN REPORT:    Verbal report received from SONNY Graves on Tameka Johnson  being received from PACU for routine post-op      Report consisted of patient's Situation, Background, Assessment and   Recommendations(SBAR).     Information from the following report(s) Nurse Handoff Report, Index, Adult Overview, Quality Measures, Neuro Assessment, and Event Log was reviewed with the receiving nurse.    Opportunity for questions and clarification was provided.      Assessment completed upon patient's arrival to unit and care assumed.    
decreased sensation and foot drop on the LLE, but is able to compensate well.  The patient performed log roll, STS, ambulation, shower transfers, and dynamic standing balance with CGA/SBA.  She required minimal cueing for shower transfer technique and maintaining spinal precautions during ADLS.  The patient maintained gait distances with the RW and SBA, cueing provided for increasing weight bearing through the LLE.        SUBJECTIVE:   Ms. Johnson states, \"Thank you so much\"     Social/Functional Lives With: Spouse  Type of Home: House  Home Layout: Two level, Able to Live on Main level with bedroom/bathroom, Performs ADL's on one level  Home Access: Stairs to enter without rails  Entrance Stairs - Number of Steps: 3  Bathroom Shower/Tub: Tub/Shower unit  Bathroom Toilet: Handicap height  Bathroom Equipment: Shower chair  Home Equipment: None  Has the patient had two or more falls in the past year or any fall with injury in the past year?: No  Receives Help From: Family  ADL Assistance: Independent  Homemaking Assistance: Independent  Homemaking Responsibilities: Yes  Ambulation Assistance: Independent  Transfer Assistance: Independent  Active : Yes  OBJECTIVE:     PAIN: VITALS / O2: PRECAUTION / LINES / DRAINS:   Pre Treatment: 5/10 back/abdominal incisions         Post Treatment: Denies pain at rest in bed Vitals        Oxygen    None    RESTRICTIONS/PRECAUTIONS:        MOBILITY: I Mod I S SBA CGA Min Mod Max Total  NT x2 Comments:   Bed Mobility    Rolling [] [] [] [x] [] [] [] [] [] [] []    Supine to Sit [] [] [x] [x] [] [] [] [] [] [] []    Scooting [] [] [x] [x] [] [] [] [] [] [] []    Sit to Supine [] [] [] [x] [] [] [] [] [] [] []    Transfers    Sit to Stand [] [] [x] [x] [] [] [] [] [] [] []    Bed to Chair [] [] [] [] [] [] [] [] [] [x] []    Stand to Sit [] [] [x] [x] [] [] [] [] [] [] []     [] [] [] [] [] [] [] [] [] [] []    I=Independent, Mod I=Modified Independent, S=Supervision, SBA=Standby 
Total=Total Assistance, NT=Not Tested    BALANCE: Good Fair+ Fair Fair- Poor NT Comments   Sitting Static [x] [] [] [] [] []    Sitting Dynamic [x] [] [] [] [] []              Standing Static [] [x] [] [] [] []    Standing Dynamic [] [x] [] [] [] []      GAIT: I Mod I S SBA CGA Min Mod Max Total  NT x2 Comments:   Level of Assistance [] [] [x] [x] [] [] [] [] [] [] []    Distance 500  feet    DME Rolling Walker    Gait Quality Decreased jacinda , Decreased step length, Foot drop, and Trunk sway increased    Weightbearing Status      Stairs      I=Independent, Mod I=Modified Independent, S=Supervision, SBA=Standby Assistance, CGA=Contact Guard Assistance,   Min=Minimal Assistance, Mod=Moderate Assistance, Max=Maximal Assistance, Total=Total Assistance, NT=Not Tested    PLAN:   FREQUENCY AND DURATION: BID for duration of hospital stay or until stated goals are met, whichever comes first.    TREATMENT:   TREATMENT:   Therapeutic Activity (25 Minutes): Therapeutic activity included Rolling, Supine to Sit, Sit to Supine, Scooting, Transfer Training, Ambulation on level ground, Sitting balance , and Standing balance to improve functional Activity tolerance, Balance, Coordination, Mobility, Strength, and ROM.    TREATMENT GRID:  N/A    AFTER TREATMENT PRECAUTIONS: Bed, Bed/Chair Locked, Call light within reach, and Needs within reach    INTERDISCIPLINARY COLLABORATION:  RN/ PCT and PT/ PTA    EDUCATION:      TIME IN/OUT:  Time In: 0915  Time Out: 0940  Minutes: 25    Evelia Han PTA   
[] [x] [] [] [] [] [] [] [] []    Supine to Sit [] [] [x] [] [] [] [] [] [] [] []    Scooting [] [] [x] [] [] [] [] [] [] [] []    Sit to Supine [] [] [x] [] [] [] [] [] [] [] []    Transfers    Sit to Stand [] [] [x] [] [] [] [] [] [] [] []    Bed to Chair [] [] [x] [] [] [] [] [] [] [] []    Stand to Sit [] [] [x] [] [] [] [] [] [] [] []     [] [] [] [] [] [] [] [] [] [] []    I=Independent, Mod I=Modified Independent, S=Supervision, SBA=Standby Assistance, CGA=Contact Guard Assistance,   Min=Minimal Assistance, Mod=Moderate Assistance, Max=Maximal Assistance, Total=Total Assistance, NT=Not Tested    BALANCE: Good Fair+ Fair Fair- Poor NT Comments   Sitting Static [x] [] [] [] [] []    Sitting Dynamic [x] [] [] [] [] []              Standing Static [x] [] [] [] [] [] With one hand hold for balance   Standing Dynamic [] [x] [] [] [] []      GAIT: I Mod I S SBA CGA Min Mod Max Total  NT x2 Comments:   Level of Assistance [] [] [x] [] [] [] [] [] [] [] []    Distance 500  feet    DME Rolling Walker    Gait Quality Decreased jacinda , Decreased step length, Foot drop, and Trunk sway increased    Weightbearing Status      Stairs      I=Independent, Mod I=Modified Independent, S=Supervision, SBA=Standby Assistance, CGA=Contact Guard Assistance,   Min=Minimal Assistance, Mod=Moderate Assistance, Max=Maximal Assistance, Total=Total Assistance, NT=Not Tested    PLAN:   FREQUENCY AND DURATION: BID for duration of hospital stay or until stated goals are met, whichever comes first.    TREATMENT:   TREATMENT:   Gait Training (30 Minutes): Gait training for 500 feet utilizing Rolling Walker. Patient required Tactile and Verbal cueing to improve Activity Pacing, Assistive Device Utilization, Dynamic Standing Balance, and Gait Mechanics.     TREATMENT GRID:  N/A    AFTER TREATMENT PRECAUTIONS: Bed, Bed/Chair Locked, Call light within reach, Needs within reach, and RN notified    INTERDISCIPLINARY COLLABORATION:  RN/ PCT and PT/ 
Patient also participated in bed mobility, functional mobility, functional transfer, bathroom mobility, and adaptive equipment training to increase independence, decrease assistance required, increase activity tolerance, increase safety awareness, and maintain precautions. The patient was educated on recommended equipment and patient verbalized understanding and demonstrated understanding.     TREATMENT GRID:  N/A    AFTER TREATMENT PRECAUTIONS:  left walking with PTA    INTERDISCIPLINARY COLLABORATION:  RN/ PCT, PT/ PTA, and OT/ [facility]DEN    EDUCATION:       TOTAL TREATMENT DURATION AND TIME:  Time In: 1347  Time Out: 1417  Minutes: 30    JESSICA Garcai

## 2024-11-22 NOTE — CARE COORDINATION
Pt is medically cleared for dc to home today with  PT/OT services through [facility] and a EMMETT from [facility].  DC summary sent to  office to notify them of pt's dc home. No other SD needs or concerns identified or reported.  CM remains available to assist as needed.       11/20/24 1313   Service Assessment   Patient Orientation Alert and Oriented   Cognition Alert   History Provided By Patient;Medical Record   Primary Caregiver Self   Accompanied By/Relationship spouse   Support Systems Spouse/Significant Other;Children;Friends/Neighbors;[employer]/[place] [employer];Family Members   Patient's Healthcare Decision Maker is: Legal Next of Kin   PCP Verified by CM Yes   Last Visit to PCP Within last 3 months  (10/21/2024)   Prior Functional Level Independent in ADLs/IADLs   Current Functional Level Independent in ADLs/IADLs   Can patient return to prior living arrangement Yes   Ability to make needs known: Good   Family able to assist with home care needs: Yes   Would you like for me to discuss the discharge plan with any other family members/significant others, and if so, who? No   [place] Resources Other (Comment[place])   Community Resources None   Social/Functional History   Lives With Spouse   Type of Home House   Home Layout Two level;Able to Live on Main level with bedroom/bathroom;Performs ADL's on one level   Home Access Stairs to enter without rails   Entrance Stairs - Number of Steps 3   Bathroom Shower/Tub Tub/Shower unit   Bathroom Toilet Handicap height   Bathroom Equipment Shower chair   Home Equipment None   Receives Help From Family   ADL Assistance Independent   Homemaking Assistance Independent   Homemaking Responsibilities Yes   Ambulation Assistance Independent   Transfer Assistance Independent   Active  Yes   Discharge Planning   Type of Residence House   Living Arrangements Spouse/Significant Other   Current Services Prior To Admission None   Potential

## 2024-11-26 ENCOUNTER — TELEPHONE (OUTPATIENT)
Dept: ORTHOPEDIC SURGERY | Age: 64
End: 2024-11-26

## 2024-11-26 NOTE — TELEPHONE ENCOUNTER
Called and left message that she can remove the bandage on daily 4 , if it is not draining leave open to air- if it is draining recover with 4 x4 and tegaderm. Also let her know that an order has been faxed to [facility] for afo brace.

## 2024-11-26 NOTE — TELEPHONE ENCOUNTER
She saw her Saturday and started her home health. Do you want them to remove the bandages on a certain day. She is asking for a call to clarify orders.

## 2024-11-27 ENCOUNTER — TELEPHONE (OUTPATIENT)
Dept: ORTHOPEDIC SURGERY | Age: 64
End: 2024-11-27

## 2024-11-27 NOTE — TELEPHONE ENCOUNTER
Spoke with patient's . I have instructed him to redress with guaze and tape. She can use an ice pack to help slow down the bleeding. I did tell him if they needed additional supplies to let us know and they can pick them up on Friday.

## 2024-11-27 NOTE — TELEPHONE ENCOUNTER
She is one week post op. Her incision is dripping blood. PT changed the bandage this morning.   Call was transferred to Alice that works with [facility].

## 2024-12-05 ENCOUNTER — OFFICE VISIT (OUTPATIENT)
Age: 64
End: 2024-12-05

## 2024-12-05 DIAGNOSIS — Z98.1 STATUS POST LUMBAR SPINAL ARTHRODESIS: Primary | ICD-10-CM

## 2024-12-05 DIAGNOSIS — M21.372 LEFT FOOT DROP: ICD-10-CM

## 2024-12-05 PROCEDURE — 99024 POSTOP FOLLOW-UP VISIT: CPT | Performed by: ORTHOPAEDIC SURGERY

## 2024-12-05 NOTE — PROGRESS NOTES
Name: Tameka Johnson  YOB: 1960  Gender: female  MRN: [id number]  Age: 64 y.o.    Chief Complaint: Lumbar spine surgery follow up    History of Present Illness:      Danielle Ford  is here for 2 week follow up of her  DLIF and posterior lumbar fusion surgery.  She had an acute left foot drop noted postsurgically.  MRI while an inpatient failed to reveal any significant residual impingement or empty foramen.  She was prescribed an AFO but has not gotten it yet.  She has been ambulating with a walker.    Physical Exam:      Respirations are unlabored and there is no evidence of cyanosis      Wound is healed nicely without erythema, drainage or underlying fluctuance    Gait is unsteady    Sensory testing reveals intact sensation to light touch and in the distribution of the L3-S1 dermatomes bilaterally.    Strength testing in the lower extremity reveals the following based on the 5 point grading scale:       HF (L2) H Ab (L5) KE (L3/4) ADF (L4) EHL (L5) A Ev (S1) APF (S1)   Right 5 5 5 5 5 5 5   Left 5 5 5 0 1 5 5        Radiographic Studies:     X-rays including AP and lateral views of the lumbar spine were reviewed and interpreted:     Postoperative changes are noted status post lumbar fusion with instrumentation.  The hardware appears to be well-placed and intact.  There is no evidence of significant osteolysis.  No significant adjacent level decompensation.  Alignment is maintained.    Radiographic Impression:    Appropriate postoperative changes status post lumbar laminectomy and fusion without evidence for hardware failure or decompensation.    Diagnosis:      ICD-10-CM    1. 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  Based on the severity of deficit, I suspect this is going to take an extended period of time for resolution.  I have recommended physical therapy to include electrical

## 2024-12-20 ENCOUNTER — TELEPHONE (OUTPATIENT)
Dept: ORTHOPEDIC SURGERY | Age: 64
End: 2024-12-20

## 2024-12-20 NOTE — TELEPHONE ENCOUNTER
Please  call this pt    She  has  done  something  to  the  rt  h p that is  scheduled  for  surgery    She  wants  to  discuss with an MA

## 2024-12-20 NOTE — TELEPHONE ENCOUNTER
Spoke with patient. Let her know that I will run her symptoms by erick on Monday and see what he says and call her back. Patient voiced understanding.

## 2024-12-20 NOTE — TELEPHONE ENCOUNTER
Rt hip pain and would like to get a CT scan done in [place], can hardly walk which is a new development

## 2024-12-30 ENCOUNTER — TELEPHONE (OUTPATIENT)
Dept: ORTHOPEDIC SURGERY | Age: 64
End: 2024-12-30

## 2024-12-30 NOTE — TELEPHONE ENCOUNTER
Spoke with patient advised her to keep her appointment scheduled for 1/8/25 so we can re evaluate her hip.

## 2024-12-30 NOTE — TELEPHONE ENCOUNTER
She is having right hip surgery in March. She fell Laure and has caused something different. She is asking for a call from [facility].

## 2025-01-02 DIAGNOSIS — M16.11 OSTEOARTHRITIS OF RIGHT HIP, UNSPECIFIED OSTEOARTHRITIS TYPE: Primary | ICD-10-CM

## 2025-01-02 DIAGNOSIS — M16.11 PRIMARY OSTEOARTHRITIS OF RIGHT HIP: ICD-10-CM

## 2025-01-09 ENCOUNTER — OFFICE VISIT (OUTPATIENT)
Age: 65
End: 2025-01-09

## 2025-01-09 DIAGNOSIS — Z98.1 STATUS POST LUMBAR SPINAL ARTHRODESIS: Primary | ICD-10-CM

## 2025-01-09 PROCEDURE — 99024 POSTOP FOLLOW-UP VISIT: CPT | Performed by: ORTHOPAEDIC SURGERY

## 2025-01-09 RX ORDER — GABAPENTIN 300 MG/1
300 CAPSULE ORAL 3 TIMES DAILY
Qty: 90 CAPSULE | Refills: 2 | Status: SHIPPED | OUTPATIENT
Start: 2025-01-09 | End: 2025-04-09

## 2025-01-09 RX ORDER — OXYCODONE HYDROCHLORIDE 5 MG/1
5 TABLET ORAL EVERY 6 HOURS PRN
Qty: 28 TABLET | Refills: 0 | Status: SHIPPED | OUTPATIENT
Start: 2025-01-09 | End: 2025-01-16

## 2025-01-09 NOTE — PROGRESS NOTES
better controlled, she would be a candidate for e-stim on the left peroneal nerve.      Electronically Signed By Johnathan Das MD   01/09/25  3:07 PM

## 2025-02-25 ENCOUNTER — HOSPITAL ENCOUNTER (OUTPATIENT)
Dept: SURGERY | Age: 65
Discharge: HOME | End: 2025-02-28
Payer: OTHER GOVERNMENT

## 2025-02-25 ENCOUNTER — HOSPITAL ENCOUNTER (OUTPATIENT)
Dept: REHABILITATION | Age: 65
Discharge: HOME | End: 2025-02-28
Payer: OTHER GOVERNMENT

## 2025-02-25 VITALS
DIASTOLIC BLOOD PRESSURE: 79 MMHG | BODY MASS INDEX: 36.32 KG/M2 | HEIGHT: 60 IN | HEART RATE: 81 BPM | SYSTOLIC BLOOD PRESSURE: 152 MMHG | OXYGEN SATURATION: 98 % | RESPIRATION RATE: 16 BRPM | TEMPERATURE: 98.4 F | WEIGHT: 185 LBS

## 2025-02-25 LAB
ALBUMIN SERPL-MCNC: 3.5 G/DL (ref 3.2–4.6)
ALBUMIN/GLOB SERPL: 0.9 (ref 1–1.9)
ALP SERPL-CCNC: 73 U/L (ref 35–104)
ALT SERPL-CCNC: 17 U/L (ref 8–45)
ANION GAP SERPL CALC-SCNC: 15 MMOL/L (ref 7–16)
AST SERPL-CCNC: 22 U/L (ref 15–37)
BILIRUB SERPL-MCNC: 0.3 MG/DL (ref 0–1.2)
BUN SERPL-MCNC: 10 MG/DL (ref 8–23)
CALCIUM SERPL-MCNC: 9.2 MG/DL (ref 8.8–10.2)
CHLORIDE SERPL-SCNC: 102 MMOL/L (ref 98–107)
CO2 SERPL-SCNC: 24 MMOL/L (ref 20–29)
CREAT SERPL-MCNC: 0.75 MG/DL (ref 0.6–1.1)
EKG ATRIAL RATE: 71 BPM
EKG DIAGNOSIS: NORMAL
EKG P AXIS: 29 DEGREES
EKG P-R INTERVAL: 120 MS
EKG Q-T INTERVAL: 376 MS
EKG QRS DURATION: 74 MS
EKG QTC CALCULATION (BAZETT): 408 MS
EKG R AXIS: 22 DEGREES
EKG T AXIS: 38 DEGREES
EKG VENTRICULAR RATE: 71 BPM
ERYTHROCYTE [DISTWIDTH] IN BLOOD BY AUTOMATED COUNT: 12.3 % (ref 11.9–14.6)
GLOBULIN SER CALC-MCNC: 3.9 G/DL (ref 2.3–3.5)
GLUCOSE SERPL-MCNC: 98 MG/DL (ref 70–99)
HCT VFR BLD AUTO: 37.5 % (ref 35.8–46.3)
HGB BLD-MCNC: 12.6 G/DL (ref 11.7–15.4)
MCH RBC QN AUTO: 31.8 PG (ref 26.1–32.9)
MCHC RBC AUTO-ENTMCNC: 33.6 G/DL (ref 31.4–35)
MCV RBC AUTO: 94.7 FL (ref 82–102)
MRSA DNA SPEC QL NAA+PROBE: NOT DETECTED
NRBC # BLD: 0 K/UL (ref 0–0.2)
PLATELET # BLD AUTO: 228 K/UL (ref 150–450)
PMV BLD AUTO: 10.8 FL (ref 9.4–12.3)
POTASSIUM SERPL-SCNC: 4.2 MMOL/L (ref 3.5–5.1)
PROT SERPL-MCNC: 7.4 G/DL (ref 6.3–8.2)
RBC # BLD AUTO: 3.96 M/UL (ref 4.05–5.2)
S AUREUS CPE NOSE QL NAA+PROBE: DETECTED
SODIUM SERPL-SCNC: 141 MMOL/L (ref 136–145)
WBC # BLD AUTO: 6 K/UL (ref 4.3–11.1)

## 2025-02-25 PROCEDURE — 93005 ELECTROCARDIOGRAM TRACING: CPT | Performed by: ANESTHESIOLOGY

## 2025-02-25 PROCEDURE — 94760 N-INVAS EAR/PLS OXIMETRY 1: CPT

## 2025-02-25 PROCEDURE — 97161 PT EVAL LOW COMPLEX 20 MIN: CPT

## 2025-02-25 PROCEDURE — 80053 COMPREHEN METABOLIC PANEL: CPT

## 2025-02-25 PROCEDURE — 87641 MR-STAPH DNA AMP PROBE: CPT

## 2025-02-25 PROCEDURE — 85027 COMPLETE CBC AUTOMATED: CPT

## 2025-02-25 PROCEDURE — 98960 EDU&TRN PT SELF-MGMT NQHP 1: CPT

## 2025-02-25 ASSESSMENT — PAIN DESCRIPTION - ORIENTATION
ORIENTATION: RIGHT;ANTERIOR;INNER
ORIENTATION: RIGHT

## 2025-02-25 ASSESSMENT — HOOS JR
HOOS JR TOTAL INTERVAL SCORE: 32.735
BENDING TO THE FLOOR TO PICK UP OBJECT: SEVERE
RISING FROM SITTING: EXTREME
HOOS JR RAW SCORE: 18
HOOS JR RAW SCORE: 18
GOING UP OR DOWN STAIRS: SEVERE
SITTING: SEVERE
WALKING ON UNEVEN SURFACE: MODERATE
LYING IN BED (TURNING OVER, MAINTAINING HIP POSITION): SEVERE

## 2025-02-25 ASSESSMENT — PAIN DESCRIPTION - DESCRIPTORS: DESCRIPTORS: DULL

## 2025-02-25 ASSESSMENT — PAIN SCALES - GENERAL
PAINLEVEL_OUTOF10: 8
PAINLEVEL_OUTOF10: 4

## 2025-02-25 ASSESSMENT — PULMONARY FUNCTION TESTS
FEV1 (%PREDICTED): 97
FEV1 (LITERS): 1.8

## 2025-02-25 ASSESSMENT — PAIN DESCRIPTION - LOCATION
LOCATION: HIP
LOCATION: BUTTOCKS;HIP

## 2025-02-25 NOTE — PROGRESS NOTES
Latest Reference Range & Units 02/25/25 10:31   Sodium 136 - 145 mmol/L 141   Potassium 3.5 - 5.1 mmol/L 4.2   Chloride 98 - 107 mmol/L 102   CARBON DIOXIDE 20 - 29 mmol/L 24   BUN,BUNPL 8 - 23 MG/DL 10   Creatinine 0.60 - 1.10 MG/DL 0.75   Anion Gap 7 - 16 mmol/L 15   Est, Glom Filt Rate >60 ml/min/1.73m2 90   Glucose 70 - 99 mg/dL 98   Calcium 8.8 - 10.2 MG/DL 9.2   Albumin/Globulin Ratio 1.0 - 1.9   0.9 (L)   Total Protein 6.3 - 8.2 g/dL 7.4   Albumin 3.2 - 4.6 g/dL 3.5   Globulin 2.3 - 3.5 g/dL 3.9 (H)   Alkaline Phosphatase 35 - 104 U/L 73   ALT 8 - 45 U/L 17   AST 15 - 37 U/L 22   Total Bilirubin 0.0 - 1.2 MG/DL 0.3   WBC 4.3 - 11.1 K/uL 6.0   RBC 4.05 - 5.2 M/uL 3.96 (L)   Hemoglobin Quant 11.7 - 15.4 g/dL 12.6   Hematocrit 35.8 - 46.3 % 37.5   MCV 82.0 - 102.0 FL 94.7   MCH 26.1 - 32.9 PG 31.8   MCHC 31.4 - 35.0 g/dL 33.6   MPV 9.4 - 12.3 FL 10.8   RDW 11.9 - 14.6 % 12.3   Platelet Count 150 - 450 K/uL 228   Nucleated Red Blood Cells 0.0 - 0.2 K/uL 0.00   (L): Data is abnormally low  (H): Data is abnormally high

## 2025-02-25 NOTE — PROGRESS NOTES
PLEASE CONTINUE TAKING ALL PRESCRIPTION MEDICATIONS UP TO THE DAY OF SURGERY UNLESS OTHERWISE DIRECTED BELOW.    DISCONTINUE all vitamins, herbals and supplements 3 weeks prior to surgery. DISCONTINUE Non-Steroidal Anti-Inflammatory (NSAIDS) such as Advil, Ibuprofen, Motrin, Naproxen and Aleve 5 days prior to surgery.       Home Medications to take  the day of surgery    Albuterol,use and bring        Flonase, if needed ( and bring)   Gabapentin                 Synthroid   Wellbutrin                    Nexium     Home Medications to Hold- please continue all other medications except these.            Comments   On the day before surgery please take 2 Extra Strength Tylenol in the morning and then again before bed. You may substitute for Tylenol 650 mg.      Bring Dynahex wash and Incentive Spirometer with you to hospital on the day of surgery.            Please do not bring home medications with you on the day of surgery unless otherwise directed by your nurse.  If you are instructed to bring home medications, please give them to your nurse as they will be administered by the nursing staff.    If you have any questions, please call [facility] [phone].    A copy of this note was provided to the patient for reference.

## 2025-02-25 NOTE — PROGRESS NOTES
25 1100   Treatment   Treatment Type Bedside spirometry   Breath Sounds   Breath Sounds Bilateral Clear   Oxygen Therapy/Pulse Ox   O2 Therapy Room air   Pulse 81   SpO2 98 %   Pulse Oximeter Device Mode Intermittent   $Pulse Oximeter $Spot check (single)   Bedside Spirometry   FEV-1/Actual (Liters) 1.8 Liters   FEV-1/Predicted (Liters) 97 Liters     Initial respiratory Assessment completed with pt. Pt was interviewed and evaluated in Joint camp prior to surgery.  Patient ID:  Tameka Johnson  [id number]  64 y.o.  1960  Surgeon: Dr. Austin  Date of Surgery: [unfilled]3/20/2025  Procedure: Total Right Hip Arthroplasty  Primary Care Physician: Chai Kim,  [phone]  Specialists:    Pt taught proper COUGH technique  IS REVIEWED WITH PT AS WELL AS BENEFITS OF USING IS IN SEDENTARY PTS.  DIAPHRAGMATIC BREATHING EXERCISE INSTRUCTIONS GIVEN    History of smokin PPD FOR 15 YEARS                 Quit date:         Secondhand smoke:DENIES    Past procedures with Oxygen desaturation or delayed awakening:DENIES     Respiratory history:DENIES SOB                             HX OF PNA                                      Respiratory meds:  DENIES    FAMILY PRESENT:             NO     PAST SLEEP STUDY:                         DENIES  HX OF SCOTTY:                                     DENIES  SCOTTY assessment:     DANGERS OF UNTREATED SCOTTY EXPLAINED TO PT.                                          SLEEPS ON SIDE        PHYSICAL EXAM   Body mass index is 36.13 kg/m².   Vitals:    25 1100   BP:    Pulse: (P) 81   Resp:    Temp:    SpO2: (P) 98%     Neck circumference: 43.5     cm    Loud snoring:                                                 YES            Witnessed apnea or wakening gasping or choking:       WAKES SELF UP SNORING  Awakens with headaches:                                               DENIES  Morning or daytime tiredness/ sleepiness:

## 2025-02-25 NOTE — PROGRESS NOTES
Patient verified name and .    Order for consent was not found in EHR and unable to match consent with case posting; patient verified.     Type 3 surgery, joint camp assessment complete.    Labs per surgeon: unknown, no orders  Labs per anesthesia protocol: CBC, CMP results within anesthesia guidelines; routed via fax to surgeon, Dr Austin for review  EKG:completed today per protocol and within anesthesia guidelines;  EKG 21 in Media and 20 in Chart Review for anesthesia reference.    MRSA/MSSA swab collected per policy. MD to consult pharmacy to dose Vanc if appropriate.     Hospital approved surgical skin cleanser and instructions to return bottle on DOS given per hospital policy.    Patient provided with handouts including Guide to Surgery, Pain Management, Preventing Surgical Site Infections, and [facility] Brochure.    Patient answered medical/surgical history questions at their best of ability. All prior to admission medications documented in Epic. Original medication prescription bottle was visualized during patient appointment.     Patient instructed to hold all vitamins 3 weeks prior to surgery and NSAIDS 5 days prior to surgery.     Patient teach back successful and patient demonstrates knowledge of instruction.

## 2025-02-25 NOTE — PROGRESS NOTES
Tameka Johnson  : 1960  Primary:  [facility]  Secondary:  [facility] at [place] [address]  [phone]      Physical Therapy Prehab Evaluation Summary:2025   Time In/Out   PT Charge Capture  Episode     MEDICAL/REFERRING DIAGNOSIS: Unilateral primary osteoarthritis, right hip [M16.11]  REFERRING PHYSICIAN: Deyvi Austin MD    Treatment Diagnosis:   Pain in Right Hip (M25.551)  Stiffness of Right Hip, Not elsewhere classified (M25.651)    DATE OF SURGERY: 3/20/25  Assessment:   COMMENTS:  Ms. Johnson is present for a Prehab Physical Therapy Assessment for their upcoming right LARRY . They are here alone. After discussing the surgical admission options and discharge plans, they are planning on discharging after one night in the hospital.    She has had a L TKA. Then had a back surgery in Nov and now has resultant L foot drop. Wears an AFO. She'd like to stay overnight and once home will have her spouse's assist. She has a rollator, RW, SW, cane, and shower chair. Asked about acquiring a raised toilet seat.  She has a [employer] insurance.    PROBLEM LIST:   (Impacting functional limitations):  Ms. Johnson presents with the following lower extremity(s) problems:  Strength  Range of Motion  Home Exercise Program  Pain INTERVENTIONS PLANNED:   (Benefits and precautions of physical therapy have been discussed with the patient.)  Home Exercise Program  Educational Discussion       GOALS: (Goals have been discussed and agreed upon with patient.)  Discharge Goals: Time Frame: 1 Day  Patient will demonstrate independence with a home exercise program designed to increase strength, range of motion, and pain control to minimize functional deficits and optimize patient for total joint replacement.    Subjective:   Past Medical History/Comorbidities:   Ms. Johnson  has a past medical history of Acute pancreatitis, Acute pharyngitis, Anxiety and

## 2025-02-26 ASSESSMENT — PROMIS GLOBAL HEALTH SCALE
TO WHAT EXTENT ARE YOU ABLE TO CARRY OUT YOUR EVERYDAY PHYSICAL ACTIVITIES SUCH AS WALKING, CLIMBING STAIRS, CARRYING GROCERIES, OR MOVING A CHAIR [ON A SCALE OF 1 (NOT AT ALL) TO 5 (COMPLETELY)]?: NOT AT ALL
IN THE PAST 7 DAYS, HOW WOULD YOU RATE YOUR FATIGUE ON AVERAGE [ON A SCALE FROM 1 (NONE) TO 5 (VERY SEVERE)]?: SEVERE
IN THE PAST 7 DAYS, HOW OFTEN HAVE YOU BEEN BOTHERED BY EMOTIONAL PROBLEMS, SUCH AS FEELING ANXIOUS, DEPRESSED, OR IRRITABLE [ON A SCALE FROM 1 (NEVER) TO 5 (ALWAYS)]?: SOMETIMES
IN GENERAL, HOW WOULD YOU RATE YOUR MENTAL HEALTH, INCLUDING YOUR MOOD AND YOUR ABILITY TO THINK [ON A SCALE OF 1 (POOR) TO 5 (EXCELLENT)]?: FAIR
IN GENERAL, PLEASE RATE HOW WELL YOU CARRY OUT YOUR USUAL SOCIAL ACTIVITIES (INCLUDES ACTIVITIES AT HOME, AT WORK, AND IN YOUR COMMUNITY, AND RESPONSIBILITIES AS A PARENT, CHILD, SPOUSE, EMPLOYEE, FRIEND, ETC) [ON A SCALE OF 1 (POOR) TO 5 (EXCELLENT)]?: FAIR
SUM OF RESPONSES TO QUESTIONS 2, 4, 5, & 10: 11
SUM OF RESPONSES TO QUESTIONS 3, 6, 7, & 8: 12
IN GENERAL, HOW WOULD YOU RATE YOUR PHYSICAL HEALTH [ON A SCALE OF 1 (POOR) TO 5 (EXCELLENT)]?: FAIR
IN GENERAL, WOULD YOU SAY YOUR HEALTH IS...[ON A SCALE OF 1 (POOR) TO 5 (EXCELLENT)]: GOOD
WHO IS THE PERSON COMPLETING THE PROMIS V1.1 SURVEY?: SELF
IN THE PAST 7 DAYS, HOW WOULD YOU RATE YOUR PAIN ON AVERAGE [ON A SCALE FROM 0 (NO PAIN) TO 10 (WORST IMAGINABLE PAIN)]?: 7
HOW IS THE PROMIS V1.1 BEING ADMINISTERED?: PAPER
IN GENERAL, WOULD YOU SAY YOUR QUALITY OF LIFE IS...[ON A SCALE OF 1 (POOR) TO 5 (EXCELLENT)]: GOOD
IN GENERAL, HOW WOULD YOU RATE YOUR SATISFACTION WITH YOUR SOCIAL ACTIVITIES AND RELATIONSHIPS [ON A SCALE OF 1 (POOR) TO 5 (EXCELLENT)]?: GOOD

## 2025-02-27 ENCOUNTER — PREP FOR PROCEDURE (OUTPATIENT)
Dept: ORTHOPEDIC SURGERY | Age: 65
End: 2025-02-27

## 2025-02-27 DIAGNOSIS — M16.11 ARTHRITIS OF RIGHT HIP: Primary | ICD-10-CM

## 2025-02-27 RX ORDER — ACETAMINOPHEN 500 MG
1000 TABLET ORAL ONCE
Status: CANCELLED | OUTPATIENT
Start: 2025-02-27 | End: 2025-02-27

## 2025-03-06 ENCOUNTER — OFFICE VISIT (OUTPATIENT)
Age: 65
End: 2025-03-06

## 2025-03-06 DIAGNOSIS — Z98.1 STATUS POST LUMBAR SPINAL ARTHRODESIS: Primary | ICD-10-CM

## 2025-03-06 DIAGNOSIS — Z98.1 STATUS POST CERVICAL SPINAL ARTHRODESIS: ICD-10-CM

## 2025-03-06 NOTE — PROGRESS NOTES
CERVICAL SPINE (2-3 VIEWS)          Assessment/Plan:      Fortunately, her left foot drop is beginning to resolve.  Hopefully, with another several months, it will resolve completely.  We discussed Achilles tendon stretching which would be important so that it is not counteracting her ankle dorsiflexors.  She is going to continue using her AFO through her hip arthroplasty and the next several weeks of recovery so as to not put her at risk for fall during that recovery.  But then we may try having her spend some time out of the AFO during ambulation if her gait is steady.  Will see her back in 4 months for the lumbar spine to include radiographs to assess for fusion.      Electronically Signed By Johnathan Das MD   03/06/25  12:17 PM

## 2025-03-06 NOTE — TELEPHONE ENCOUNTER
Per note from  Bronson    I called this pt and told her  she  needs to come in and  have an office  visit  with DJW  to set up  surgery     She  was  driving and  states  she  will call back to schedule the  appt     Dt/appts     RN brought the patient to ED via wheelchair, accompanied by family member. Report given to LENORA Fields. Patient's care endorsed to ED.  RN called [facility], spoke with LENORA Huitron; updated.

## 2025-03-14 ENCOUNTER — OFFICE VISIT (OUTPATIENT)
Dept: ORTHOPEDIC SURGERY | Age: 65
End: 2025-03-14

## 2025-03-14 DIAGNOSIS — Z01.818 PRE-OP EVALUATION: ICD-10-CM

## 2025-03-14 DIAGNOSIS — M16.11 OSTEOARTHRITIS OF RIGHT HIP, UNSPECIFIED OSTEOARTHRITIS TYPE: Primary | ICD-10-CM

## 2025-03-14 NOTE — PROGRESS NOTES
Name: Tameka Johnson  YOB: 1960  Gender: female  MRN: [id number]    Pre-Op     CC: RIGHT HIP PAIN       This patient comes in for pre-op exam prior to RIGHT LARRY.  The patient has been cleared preoperatively.  I counseled the patient once again about the risks of infection, DVT formation, expected time of hospitalization, anticipated recovery time as well as rehab needs and expectations for recovery.  The patient would like to proceed and we will do so as planned. The patient was provided with pain medications as well as DVT prophylaxis to have on hand postoperatively at the time of discharge from hospital. All pertinent questions asked by the patient were answered.    SUKHDEV Quiles

## 2025-03-14 NOTE — PROGRESS NOTES
Name: Tameka Johnson  YOB: 1960  Gender: female  MRN: [id number]    Radiographs:    An AP pelvis and table down lateral the right hip was obtained  This demonstrated There is severe joint space narrowing with bone-on-bone articulation, There is subchondral cyst formation on the acetabular side, There is subchondral cyst formation on the in the femoral head and neck, There is osteophyte formation around the acetabular rim, and Osteophyte formation around the femoral neck .    Radiographic impression: severe DJD right HIP    JAS APARICIO MD

## 2025-03-14 NOTE — H&P
H&P    Patient ID:  Tameka Johnson  [id number]  64 y.o.  1960  Surgeon:  Deyvi Austin MD  Date of Surgery: * No surgery found *  Procedure: Right Total Hip Arthroplasty  Primary Care Physician: Chai Kim DO        Subjective:  Tameka Johnson is a 64 y.o. White (non-) female who presents with right hip pain.  They have a history of right hip pain for several months. Symptoms worse with walking long distances and relieved with rest. Conservative treatment consisting of  activity modification has not helped. The patient lives with their family. The patients goal after surgery is improved pain and function.        Past Medical History:   Diagnosis Date    Acute pancreatitis     no issues currently    Acute pharyngitis     Anxiety and depression     Cervicalgia     Chondromalacia of patella     Chronic maxillary sinusitis     Chronic UTI     Foot drop, left foot     pt uses prosthetic device in order to ambulate    Gastroesophageal reflux disease     managed with med    Hypothyroidism     Irritability     Lateral epicondylitis  of elbow     Lumbago     Migraines     pt denies    Osteoarthritis     PONV (postoperative nausea and vomiting)     Postmenopausal atrophic vaginitis     Primary hypothyroidism     managed with med; due to hashimotos    Primary insomnia     Pure hypercholesterolemia     pt states not anymore; working on lowering it with diet change    Rosacea     Seasonal allergies     Vitamin D deficiency       Past Surgical History:   Procedure Laterality Date    CARDIAC CATHETERIZATION      normal    CERVICAL FUSION  2019    ANTERIOR CERVICAL DISCECTOMY W/ FUSION- Dr Tyson    CERVICAL FUSION N/A 4/10/2024    ERAS-C3-C7 laminectomy and C3-T1 posterior fusion with allograft, and lateral mass instrumentation. performed by Johnathan Tyson MD at [facility] MAIN OR     SECTION      CHOLECYSTECTOMY, LAPAROSCOPIC      Dr López

## 2025-03-18 DIAGNOSIS — M16.11 OSTEOARTHRITIS OF RIGHT HIP, UNSPECIFIED OSTEOARTHRITIS TYPE: Primary | ICD-10-CM

## 2025-03-18 RX ORDER — METHOCARBAMOL 750 MG/1
TABLET, FILM COATED ORAL
Qty: 40 TABLET | Refills: 0 | Status: ON HOLD | OUTPATIENT
Start: 2025-03-18 | End: 2025-03-20 | Stop reason: HOSPADM

## 2025-03-18 RX ORDER — ASPIRIN 81 MG/1
81 TABLET ORAL 2 TIMES DAILY
Qty: 60 TABLET | Refills: 0 | Status: SHIPPED | OUTPATIENT
Start: 2025-03-18

## 2025-03-18 RX ORDER — OXYCODONE HYDROCHLORIDE 5 MG/1
5-10 TABLET ORAL EVERY 4 HOURS PRN
Qty: 60 TABLET | Refills: 0 | Status: SHIPPED | OUTPATIENT
Start: 2025-03-18 | End: 2025-03-25

## 2025-03-18 RX ORDER — CELECOXIB 200 MG/1
200 CAPSULE ORAL DAILY
Qty: 30 CAPSULE | Refills: 0 | Status: SHIPPED | OUTPATIENT
Start: 2025-03-18

## 2025-03-18 RX ORDER — ONDANSETRON 4 MG/1
4 TABLET, FILM COATED ORAL EVERY 6 HOURS PRN
Qty: 30 TABLET | Refills: 0 | Status: SHIPPED | OUTPATIENT
Start: 2025-03-18

## 2025-03-20 ENCOUNTER — ANESTHESIA EVENT (OUTPATIENT)
Dept: SURGERY | Age: 65
End: 2025-03-20
Payer: OTHER GOVERNMENT

## 2025-03-20 ENCOUNTER — ANESTHESIA (OUTPATIENT)
Dept: SURGERY | Age: 65
End: 2025-03-20
Payer: OTHER GOVERNMENT

## 2025-03-20 ENCOUNTER — APPOINTMENT (OUTPATIENT)
Dept: GENERAL RADIOLOGY | Age: 65
End: 2025-03-20
Attending: ORTHOPAEDIC SURGERY
Payer: OTHER GOVERNMENT

## 2025-03-20 ENCOUNTER — HOSPITAL ENCOUNTER (OUTPATIENT)
Age: 65
Discharge: HOME HEALTH | End: 2025-03-21
Attending: ORTHOPAEDIC SURGERY | Admitting: ORTHOPAEDIC SURGERY
Payer: OTHER GOVERNMENT

## 2025-03-20 PROCEDURE — 7100000000 HC PACU RECOVERY - FIRST 15 MIN: Performed by: ORTHOPAEDIC SURGERY

## 2025-03-20 PROCEDURE — 6360000002 HC RX W HCPCS: Performed by: ANESTHESIOLOGY

## 2025-03-20 PROCEDURE — 6360000002 HC RX W HCPCS: Performed by: ORTHOPAEDIC SURGERY

## 2025-03-20 PROCEDURE — 97161 PT EVAL LOW COMPLEX 20 MIN: CPT

## 2025-03-20 PROCEDURE — 2500000003 HC RX 250 WO HCPCS: Performed by: ORTHOPAEDIC SURGERY

## 2025-03-20 PROCEDURE — 2580000003 HC RX 258: Performed by: ANESTHESIOLOGY

## 2025-03-20 PROCEDURE — 6360000002 HC RX W HCPCS

## 2025-03-20 PROCEDURE — 6370000000 HC RX 637 (ALT 250 FOR IP): Performed by: ANESTHESIOLOGY

## 2025-03-20 PROCEDURE — 97535 SELF CARE MNGMENT TRAINING: CPT

## 2025-03-20 PROCEDURE — 72170 X-RAY EXAM OF PELVIS: CPT

## 2025-03-20 PROCEDURE — 2720000010 HC SURG SUPPLY STERILE: Performed by: ORTHOPAEDIC SURGERY

## 2025-03-20 PROCEDURE — C1776 JOINT DEVICE (IMPLANTABLE): HCPCS | Performed by: ORTHOPAEDIC SURGERY

## 2025-03-20 PROCEDURE — 97165 OT EVAL LOW COMPLEX 30 MIN: CPT

## 2025-03-20 PROCEDURE — C1713 ANCHOR/SCREW BN/BN,TIS/BN: HCPCS | Performed by: ORTHOPAEDIC SURGERY

## 2025-03-20 PROCEDURE — 3600000015 HC SURGERY LEVEL 5 ADDTL 15MIN: Performed by: ORTHOPAEDIC SURGERY

## 2025-03-20 PROCEDURE — 3700000000 HC ANESTHESIA ATTENDED CARE: Performed by: ORTHOPAEDIC SURGERY

## 2025-03-20 PROCEDURE — 94760 N-INVAS EAR/PLS OXIMETRY 1: CPT

## 2025-03-20 PROCEDURE — 27130 TOTAL HIP ARTHROPLASTY: CPT | Performed by: ORTHOPAEDIC SURGERY

## 2025-03-20 PROCEDURE — 2709999900 HC NON-CHARGEABLE SUPPLY: Performed by: ORTHOPAEDIC SURGERY

## 2025-03-20 PROCEDURE — 6370000000 HC RX 637 (ALT 250 FOR IP): Performed by: ORTHOPAEDIC SURGERY

## 2025-03-20 PROCEDURE — 2500000003 HC RX 250 WO HCPCS

## 2025-03-20 PROCEDURE — 3700000001 HC ADD 15 MINUTES (ANESTHESIA): Performed by: ORTHOPAEDIC SURGERY

## 2025-03-20 PROCEDURE — 3600000005 HC SURGERY LEVEL 5 BASE: Performed by: ORTHOPAEDIC SURGERY

## 2025-03-20 PROCEDURE — 2700000000 HC OXYGEN THERAPY PER DAY

## 2025-03-20 PROCEDURE — 97530 THERAPEUTIC ACTIVITIES: CPT

## 2025-03-20 PROCEDURE — 7100000001 HC PACU RECOVERY - ADDTL 15 MIN: Performed by: ORTHOPAEDIC SURGERY

## 2025-03-20 PROCEDURE — 94761 N-INVAS EAR/PLS OXIMETRY MLT: CPT

## 2025-03-20 PROCEDURE — 6370000000 HC RX 637 (ALT 250 FOR IP): Performed by: PHYSICIAN ASSISTANT

## 2025-03-20 PROCEDURE — 6360000002 HC RX W HCPCS: Performed by: PHYSICIAN ASSISTANT

## 2025-03-20 DEVICE — LINER ACET NEUT 36X50 MM AOX POLYETH STRL EMPHASYS LTX: Type: IMPLANTABLE DEVICE | Site: HIP | Status: FUNCTIONAL

## 2025-03-20 DEVICE — STEM FEM SZ 3 HIP STD OFFSET CLLRD CEMENTLESS 12/14 TAPR: Type: IMPLANTABLE DEVICE | Site: HIP | Status: FUNCTIONAL

## 2025-03-20 DEVICE — HEAD FEM DIA36MM +5MM OFFSET 12/14 TAPR HIP CERAMIC BIOLOX: Type: IMPLANTABLE DEVICE | Site: HIP | Status: FUNCTIONAL

## 2025-03-20 DEVICE — SCREW BNE L20MM DIA6.5MM CANC HIP S STL GRIPTION FULL THRD: Type: IMPLANTABLE DEVICE | Site: HIP | Status: FUNCTIONAL

## 2025-03-20 DEVICE — SCREW BNE L25MM DIA6.5MM CANC HIP S STL GRIPTION FULL THRD: Type: IMPLANTABLE DEVICE | Site: HIP | Status: FUNCTIONAL

## 2025-03-20 DEVICE — HIP H2 TOT ADV OTHER HD IMPL CAPPED SYNTHES: Type: IMPLANTABLE DEVICE | Site: HIP | Status: FUNCTIONAL

## 2025-03-20 DEVICE — SHELL ACET CMNTLS 50 MM 3 HOLE STRL EMPHASYS LTX: Type: IMPLANTABLE DEVICE | Site: HIP | Status: FUNCTIONAL

## 2025-03-20 RX ORDER — LIDOCAINE HYDROCHLORIDE 10 MG/ML
1 INJECTION, SOLUTION INFILTRATION; PERINEURAL
Status: DISCONTINUED | OUTPATIENT
Start: 2025-03-20 | End: 2025-03-20 | Stop reason: HOSPADM

## 2025-03-20 RX ORDER — LEVOTHYROXINE SODIUM 100 UG/1
150 TABLET ORAL DAILY
Status: DISCONTINUED | OUTPATIENT
Start: 2025-03-21 | End: 2025-03-21 | Stop reason: HOSPADM

## 2025-03-20 RX ORDER — HYDROMORPHONE HYDROCHLORIDE 1 MG/ML
1 INJECTION, SOLUTION INTRAMUSCULAR; INTRAVENOUS; SUBCUTANEOUS
Status: DISCONTINUED | OUTPATIENT
Start: 2025-03-20 | End: 2025-03-21 | Stop reason: HOSPADM

## 2025-03-20 RX ORDER — SCOPOLAMINE 1 MG/3D
1 PATCH, EXTENDED RELEASE TRANSDERMAL ONCE
Status: DISCONTINUED | OUTPATIENT
Start: 2025-03-20 | End: 2025-03-20

## 2025-03-20 RX ORDER — OXYCODONE HYDROCHLORIDE 5 MG/1
5 TABLET ORAL
Status: COMPLETED | OUTPATIENT
Start: 2025-03-20 | End: 2025-03-20

## 2025-03-20 RX ORDER — ACETAMINOPHEN 500 MG
1000 TABLET ORAL ONCE
Status: DISCONTINUED | OUTPATIENT
Start: 2025-03-20 | End: 2025-03-20 | Stop reason: SDUPTHER

## 2025-03-20 RX ORDER — PROPOFOL 10 MG/ML
INJECTION, EMULSION INTRAVENOUS
Status: DISCONTINUED | OUTPATIENT
Start: 2025-03-20 | End: 2025-03-20 | Stop reason: SDUPTHER

## 2025-03-20 RX ORDER — TRANEXAMIC ACID 100 MG/ML
INJECTION, SOLUTION INTRAVENOUS
Status: DISCONTINUED | OUTPATIENT
Start: 2025-03-20 | End: 2025-03-20 | Stop reason: SDUPTHER

## 2025-03-20 RX ORDER — PROMETHAZINE HYDROCHLORIDE 25 MG/1
25 TABLET ORAL EVERY 6 HOURS PRN
Status: DISCONTINUED | OUTPATIENT
Start: 2025-03-20 | End: 2025-03-21 | Stop reason: HOSPADM

## 2025-03-20 RX ORDER — HYDROMORPHONE HYDROCHLORIDE 1 MG/ML
0.5 INJECTION, SOLUTION INTRAMUSCULAR; INTRAVENOUS; SUBCUTANEOUS
Status: DISCONTINUED | OUTPATIENT
Start: 2025-03-20 | End: 2025-03-21 | Stop reason: HOSPADM

## 2025-03-20 RX ORDER — FENTANYL CITRATE 50 UG/ML
INJECTION, SOLUTION INTRAMUSCULAR; INTRAVENOUS
Status: DISCONTINUED | OUTPATIENT
Start: 2025-03-20 | End: 2025-03-20 | Stop reason: SDUPTHER

## 2025-03-20 RX ORDER — PROCHLORPERAZINE EDISYLATE 5 MG/ML
5 INJECTION INTRAMUSCULAR; INTRAVENOUS
Status: COMPLETED | OUTPATIENT
Start: 2025-03-20 | End: 2025-03-20

## 2025-03-20 RX ORDER — SENNA AND DOCUSATE SODIUM 50; 8.6 MG/1; MG/1
1 TABLET, FILM COATED ORAL 2 TIMES DAILY
Status: DISCONTINUED | OUTPATIENT
Start: 2025-03-20 | End: 2025-03-21 | Stop reason: HOSPADM

## 2025-03-20 RX ORDER — FENTANYL CITRATE 50 UG/ML
100 INJECTION, SOLUTION INTRAMUSCULAR; INTRAVENOUS
Status: DISCONTINUED | OUTPATIENT
Start: 2025-03-20 | End: 2025-03-20 | Stop reason: HOSPADM

## 2025-03-20 RX ORDER — DIPHENHYDRAMINE HCL 25 MG
25 CAPSULE ORAL EVERY 6 HOURS PRN
Status: DISCONTINUED | OUTPATIENT
Start: 2025-03-20 | End: 2025-03-21 | Stop reason: HOSPADM

## 2025-03-20 RX ORDER — OXYCODONE HYDROCHLORIDE 5 MG/1
5 TABLET ORAL EVERY 4 HOURS PRN
Status: DISCONTINUED | OUTPATIENT
Start: 2025-03-20 | End: 2025-03-21 | Stop reason: HOSPADM

## 2025-03-20 RX ORDER — ACETAMINOPHEN 325 MG/1
650 TABLET ORAL EVERY 6 HOURS
Status: DISCONTINUED | OUTPATIENT
Start: 2025-03-20 | End: 2025-03-21 | Stop reason: HOSPADM

## 2025-03-20 RX ORDER — MIDAZOLAM HYDROCHLORIDE 1 MG/ML
INJECTION, SOLUTION INTRAMUSCULAR; INTRAVENOUS
Status: DISCONTINUED | OUTPATIENT
Start: 2025-03-20 | End: 2025-03-20 | Stop reason: SDUPTHER

## 2025-03-20 RX ORDER — KETAMINE HCL IN NACL, ISO-OSM 20 MG/2 ML
SYRINGE (ML) INJECTION
Status: DISCONTINUED | OUTPATIENT
Start: 2025-03-20 | End: 2025-03-20 | Stop reason: SDUPTHER

## 2025-03-20 RX ORDER — ASPIRIN 81 MG/1
81 TABLET ORAL 2 TIMES DAILY
Status: DISCONTINUED | OUTPATIENT
Start: 2025-03-20 | End: 2025-03-21 | Stop reason: HOSPADM

## 2025-03-20 RX ORDER — ROCURONIUM BROMIDE 10 MG/ML
INJECTION, SOLUTION INTRAVENOUS
Status: DISCONTINUED | OUTPATIENT
Start: 2025-03-20 | End: 2025-03-20 | Stop reason: SDUPTHER

## 2025-03-20 RX ORDER — CITALOPRAM HYDROBROMIDE 10 MG/1
10 TABLET ORAL NIGHTLY
Status: DISCONTINUED | OUTPATIENT
Start: 2025-03-20 | End: 2025-03-21 | Stop reason: HOSPADM

## 2025-03-20 RX ORDER — LABETALOL HYDROCHLORIDE 5 MG/ML
INJECTION, SOLUTION INTRAVENOUS
Status: COMPLETED
Start: 2025-03-20 | End: 2025-03-20

## 2025-03-20 RX ORDER — MAGNESIUM HYDROXIDE/ALUMINUM HYDROXICE/SIMETHICONE 120; 1200; 1200 MG/30ML; MG/30ML; MG/30ML
15 SUSPENSION ORAL EVERY 6 HOURS PRN
Status: DISCONTINUED | OUTPATIENT
Start: 2025-03-20 | End: 2025-03-21 | Stop reason: HOSPADM

## 2025-03-20 RX ORDER — BUPROPION HYDROCHLORIDE 150 MG/1
150 TABLET ORAL DAILY
Status: DISCONTINUED | OUTPATIENT
Start: 2025-03-21 | End: 2025-03-21 | Stop reason: HOSPADM

## 2025-03-20 RX ORDER — SODIUM CHLORIDE 0.9 % (FLUSH) 0.9 %
5-40 SYRINGE (ML) INJECTION EVERY 12 HOURS SCHEDULED
Status: DISCONTINUED | OUTPATIENT
Start: 2025-03-20 | End: 2025-03-20 | Stop reason: HOSPADM

## 2025-03-20 RX ORDER — SODIUM CHLORIDE, SODIUM LACTATE, POTASSIUM CHLORIDE, CALCIUM CHLORIDE 600; 310; 30; 20 MG/100ML; MG/100ML; MG/100ML; MG/100ML
INJECTION, SOLUTION INTRAVENOUS CONTINUOUS
Status: DISCONTINUED | OUTPATIENT
Start: 2025-03-20 | End: 2025-03-20 | Stop reason: HOSPADM

## 2025-03-20 RX ORDER — ONDANSETRON 2 MG/ML
INJECTION INTRAMUSCULAR; INTRAVENOUS
Status: DISCONTINUED | OUTPATIENT
Start: 2025-03-20 | End: 2025-03-20 | Stop reason: SDUPTHER

## 2025-03-20 RX ORDER — EPHEDRINE SULFATE 5 MG/ML
INJECTION INTRAVENOUS
Status: DISCONTINUED | OUTPATIENT
Start: 2025-03-20 | End: 2025-03-20 | Stop reason: SDUPTHER

## 2025-03-20 RX ORDER — SODIUM CHLORIDE 9 MG/ML
INJECTION, SOLUTION INTRAVENOUS CONTINUOUS
Status: DISCONTINUED | OUTPATIENT
Start: 2025-03-20 | End: 2025-03-21 | Stop reason: HOSPADM

## 2025-03-20 RX ORDER — DIPHENHYDRAMINE HYDROCHLORIDE 50 MG/ML
25 INJECTION, SOLUTION INTRAMUSCULAR; INTRAVENOUS EVERY 6 HOURS PRN
Status: DISCONTINUED | OUTPATIENT
Start: 2025-03-20 | End: 2025-03-21 | Stop reason: HOSPADM

## 2025-03-20 RX ORDER — OXYCODONE HYDROCHLORIDE 5 MG/1
10 TABLET ORAL EVERY 4 HOURS PRN
Status: DISCONTINUED | OUTPATIENT
Start: 2025-03-20 | End: 2025-03-21 | Stop reason: HOSPADM

## 2025-03-20 RX ORDER — SODIUM CHLORIDE 0.9 % (FLUSH) 0.9 %
5-40 SYRINGE (ML) INJECTION PRN
Status: DISCONTINUED | OUTPATIENT
Start: 2025-03-20 | End: 2025-03-21 | Stop reason: HOSPADM

## 2025-03-20 RX ORDER — ROPIVACAINE HYDROCHLORIDE 2 MG/ML
INJECTION, SOLUTION EPIDURAL; INFILTRATION; PERINEURAL PRN
Status: DISCONTINUED | OUTPATIENT
Start: 2025-03-20 | End: 2025-03-20 | Stop reason: HOSPADM

## 2025-03-20 RX ORDER — SODIUM CHLORIDE 0.9 % (FLUSH) 0.9 %
5-40 SYRINGE (ML) INJECTION EVERY 12 HOURS SCHEDULED
Status: DISCONTINUED | OUTPATIENT
Start: 2025-03-20 | End: 2025-03-21 | Stop reason: HOSPADM

## 2025-03-20 RX ORDER — ALBUTEROL SULFATE 0.83 MG/ML
2.5 SOLUTION RESPIRATORY (INHALATION) EVERY 4 HOURS PRN
Status: DISCONTINUED | OUTPATIENT
Start: 2025-03-20 | End: 2025-03-21 | Stop reason: HOSPADM

## 2025-03-20 RX ORDER — ACETAMINOPHEN 500 MG
1000 TABLET ORAL ONCE
Status: COMPLETED | OUTPATIENT
Start: 2025-03-20 | End: 2025-03-20

## 2025-03-20 RX ORDER — ACETAMINOPHEN 500 MG
1000 TABLET ORAL EVERY 6 HOURS PRN
COMMUNITY

## 2025-03-20 RX ORDER — SODIUM CHLORIDE 9 MG/ML
INJECTION, SOLUTION INTRAVENOUS PRN
Status: DISCONTINUED | OUTPATIENT
Start: 2025-03-20 | End: 2025-03-21 | Stop reason: HOSPADM

## 2025-03-20 RX ORDER — DEXAMETHASONE SODIUM PHOSPHATE 10 MG/ML
INJECTION, SOLUTION INTRA-ARTICULAR; INTRALESIONAL; INTRAMUSCULAR; INTRAVENOUS; SOFT TISSUE
Status: DISCONTINUED | OUTPATIENT
Start: 2025-03-20 | End: 2025-03-20 | Stop reason: SDUPTHER

## 2025-03-20 RX ORDER — SODIUM CHLORIDE 0.9 % (FLUSH) 0.9 %
5-40 SYRINGE (ML) INJECTION PRN
Status: DISCONTINUED | OUTPATIENT
Start: 2025-03-20 | End: 2025-03-20 | Stop reason: HOSPADM

## 2025-03-20 RX ORDER — HYDROMORPHONE HYDROCHLORIDE 2 MG/ML
INJECTION, SOLUTION INTRAMUSCULAR; INTRAVENOUS; SUBCUTANEOUS
Status: DISCONTINUED | OUTPATIENT
Start: 2025-03-20 | End: 2025-03-20 | Stop reason: SDUPTHER

## 2025-03-20 RX ORDER — LABETALOL HYDROCHLORIDE 5 MG/ML
10 INJECTION, SOLUTION INTRAVENOUS ONCE
Status: COMPLETED | OUTPATIENT
Start: 2025-03-20 | End: 2025-03-20

## 2025-03-20 RX ORDER — MIDAZOLAM HYDROCHLORIDE 2 MG/2ML
2 INJECTION, SOLUTION INTRAMUSCULAR; INTRAVENOUS
Status: DISCONTINUED | OUTPATIENT
Start: 2025-03-20 | End: 2025-03-20 | Stop reason: HOSPADM

## 2025-03-20 RX ORDER — NALOXONE HYDROCHLORIDE 0.4 MG/ML
INJECTION, SOLUTION INTRAMUSCULAR; INTRAVENOUS; SUBCUTANEOUS PRN
Status: DISCONTINUED | OUTPATIENT
Start: 2025-03-20 | End: 2025-03-20 | Stop reason: HOSPADM

## 2025-03-20 RX ORDER — GLYCOPYRROLATE 0.2 MG/ML
INJECTION INTRAMUSCULAR; INTRAVENOUS
Status: DISCONTINUED | OUTPATIENT
Start: 2025-03-20 | End: 2025-03-20 | Stop reason: SDUPTHER

## 2025-03-20 RX ORDER — ONDANSETRON 2 MG/ML
4 INJECTION INTRAMUSCULAR; INTRAVENOUS EVERY 6 HOURS PRN
Status: DISCONTINUED | OUTPATIENT
Start: 2025-03-20 | End: 2025-03-21 | Stop reason: HOSPADM

## 2025-03-20 RX ORDER — NEOSTIGMINE METHYLSULFATE 1 MG/ML
INJECTION, SOLUTION INTRAVENOUS
Status: DISCONTINUED | OUTPATIENT
Start: 2025-03-20 | End: 2025-03-20 | Stop reason: SDUPTHER

## 2025-03-20 RX ORDER — SODIUM CHLORIDE 9 MG/ML
INJECTION, SOLUTION INTRAVENOUS PRN
Status: DISCONTINUED | OUTPATIENT
Start: 2025-03-20 | End: 2025-03-20 | Stop reason: HOSPADM

## 2025-03-20 RX ADMIN — OXYCODONE 5 MG: 5 TABLET ORAL at 09:24

## 2025-03-20 RX ADMIN — CITALOPRAM 10 MG: 10 TABLET, FILM COATED ORAL at 19:33

## 2025-03-20 RX ADMIN — SODIUM CHLORIDE, SODIUM LACTATE, POTASSIUM CHLORIDE, AND CALCIUM CHLORIDE: 600; 310; 30; 20 INJECTION, SOLUTION INTRAVENOUS at 07:58

## 2025-03-20 RX ADMIN — EPHEDRINE SULFATE 10 MG: 5 INJECTION INTRAVENOUS at 08:10

## 2025-03-20 RX ADMIN — ASPIRIN 81 MG: 81 TABLET, COATED ORAL at 19:33

## 2025-03-20 RX ADMIN — Medication 3 MG: at 08:29

## 2025-03-20 RX ADMIN — DEXAMETHASONE SODIUM PHOSPHATE 10 MG: 10 INJECTION INTRAMUSCULAR; INTRAVENOUS at 07:13

## 2025-03-20 RX ADMIN — ROCURONIUM BROMIDE 50 MG: 10 INJECTION, SOLUTION INTRAVENOUS at 07:06

## 2025-03-20 RX ADMIN — Medication 20 MG: at 07:46

## 2025-03-20 RX ADMIN — HYDROMORPHONE HYDROCHLORIDE 0.5 MG: 1 INJECTION, SOLUTION INTRAMUSCULAR; INTRAVENOUS; SUBCUTANEOUS at 09:07

## 2025-03-20 RX ADMIN — ONDANSETRON 4 MG: 2 INJECTION INTRAMUSCULAR; INTRAVENOUS at 08:29

## 2025-03-20 RX ADMIN — PROMETHAZINE HYDROCHLORIDE 25 MG: 25 TABLET ORAL at 19:33

## 2025-03-20 RX ADMIN — PHENYLEPHRINE HYDROCHLORIDE 100 MCG: 0.1 INJECTION, SOLUTION INTRAVENOUS at 08:21

## 2025-03-20 RX ADMIN — ACETAMINOPHEN 1000 MG: 500 TABLET, FILM COATED ORAL at 06:26

## 2025-03-20 RX ADMIN — CEFAZOLIN 2000 MG: 10 INJECTION, POWDER, FOR SOLUTION INTRAVENOUS at 16:12

## 2025-03-20 RX ADMIN — CEFAZOLIN 2000 MG: 10 INJECTION, POWDER, FOR SOLUTION INTRAVENOUS at 23:14

## 2025-03-20 RX ADMIN — ACETAMINOPHEN 650 MG: 325 TABLET ORAL at 23:13

## 2025-03-20 RX ADMIN — TRANEXAMIC ACID 1000 MG: 100 INJECTION, SOLUTION INTRAVENOUS at 07:15

## 2025-03-20 RX ADMIN — PROPOFOL 200 MG: 10 INJECTION, EMULSION INTRAVENOUS at 07:06

## 2025-03-20 RX ADMIN — FENTANYL CITRATE 100 MCG: 50 INJECTION, SOLUTION INTRAMUSCULAR; INTRAVENOUS at 07:03

## 2025-03-20 RX ADMIN — EPHEDRINE SULFATE 10 MG: 5 INJECTION INTRAVENOUS at 07:24

## 2025-03-20 RX ADMIN — LABETALOL HYDROCHLORIDE 10 MG: 5 INJECTION, SOLUTION INTRAVENOUS at 09:30

## 2025-03-20 RX ADMIN — MIDAZOLAM 2 MG: 1 INJECTION INTRAMUSCULAR; INTRAVENOUS at 06:58

## 2025-03-20 RX ADMIN — HYDROMORPHONE HYDROCHLORIDE 0.5 MG: 1 INJECTION, SOLUTION INTRAMUSCULAR; INTRAVENOUS; SUBCUTANEOUS at 09:02

## 2025-03-20 RX ADMIN — PROCHLORPERAZINE EDISYLATE 5 MG: 5 INJECTION INTRAMUSCULAR; INTRAVENOUS at 09:04

## 2025-03-20 RX ADMIN — SODIUM CHLORIDE 150 MG: 9 INJECTION, SOLUTION INTRAVENOUS at 06:47

## 2025-03-20 RX ADMIN — SODIUM CHLORIDE, SODIUM LACTATE, POTASSIUM CHLORIDE, AND CALCIUM CHLORIDE: 600; 310; 30; 20 INJECTION, SOLUTION INTRAVENOUS at 06:27

## 2025-03-20 RX ADMIN — Medication 2000 MG: at 07:15

## 2025-03-20 RX ADMIN — HYDROMORPHONE HYDROCHLORIDE 0.5 MG: 1 INJECTION, SOLUTION INTRAMUSCULAR; INTRAVENOUS; SUBCUTANEOUS at 09:12

## 2025-03-20 RX ADMIN — OXYCODONE 10 MG: 5 TABLET ORAL at 10:25

## 2025-03-20 RX ADMIN — SENNOSIDES, DOCUSATE SODIUM 1 TABLET: 50; 8.6 TABLET, FILM COATED ORAL at 19:33

## 2025-03-20 RX ADMIN — SODIUM CHLORIDE, PRESERVATIVE FREE 10 ML: 5 INJECTION INTRAVENOUS at 19:33

## 2025-03-20 RX ADMIN — PHENYLEPHRINE HYDROCHLORIDE 100 MCG: 0.1 INJECTION, SOLUTION INTRAVENOUS at 07:24

## 2025-03-20 RX ADMIN — OXYCODONE 10 MG: 5 TABLET ORAL at 19:33

## 2025-03-20 RX ADMIN — HYDROMORPHONE HYDROCHLORIDE 0.5 MG: 1 INJECTION, SOLUTION INTRAMUSCULAR; INTRAVENOUS; SUBCUTANEOUS at 09:17

## 2025-03-20 RX ADMIN — HYDROMORPHONE HYDROCHLORIDE 1 MG: 2 INJECTION INTRAMUSCULAR; INTRAVENOUS; SUBCUTANEOUS at 07:45

## 2025-03-20 RX ADMIN — ACETAMINOPHEN 650 MG: 325 TABLET ORAL at 10:25

## 2025-03-20 RX ADMIN — Medication 20 MG: at 07:06

## 2025-03-20 RX ADMIN — PHENYLEPHRINE HYDROCHLORIDE 100 MCG: 0.1 INJECTION, SOLUTION INTRAVENOUS at 07:18

## 2025-03-20 RX ADMIN — GLYCOPYRROLATE 0.4 MG: 0.2 INJECTION INTRAMUSCULAR; INTRAVENOUS at 08:29

## 2025-03-20 RX ADMIN — ACETAMINOPHEN 650 MG: 325 TABLET ORAL at 16:12

## 2025-03-20 RX ADMIN — OXYCODONE 10 MG: 5 TABLET ORAL at 23:13

## 2025-03-20 ASSESSMENT — PAIN SCALES - GENERAL
PAINLEVEL_OUTOF10: 2
PAINLEVEL_OUTOF10: 6
PAINLEVEL_OUTOF10: 7
PAINLEVEL_OUTOF10: 3
PAINLEVEL_OUTOF10: 7
PAINLEVEL_OUTOF10: 3
PAINLEVEL_OUTOF10: 5
PAINLEVEL_OUTOF10: 7
PAINLEVEL_OUTOF10: 6
PAINLEVEL_OUTOF10: 7
PAINLEVEL_OUTOF10: 7
PAINLEVEL_OUTOF10: 6

## 2025-03-20 ASSESSMENT — PAIN DESCRIPTION - FREQUENCY
FREQUENCY: INTERMITTENT

## 2025-03-20 ASSESSMENT — PAIN DESCRIPTION - DESCRIPTORS
DESCRIPTORS: DISCOMFORT
DESCRIPTORS: ACHING;SORE
DESCRIPTORS: DISCOMFORT;SHARP;STABBING
DESCRIPTORS: ACHING
DESCRIPTORS: DISCOMFORT

## 2025-03-20 ASSESSMENT — PAIN DESCRIPTION - LOCATION
LOCATION: HIP

## 2025-03-20 ASSESSMENT — PAIN DESCRIPTION - ORIENTATION
ORIENTATION: RIGHT

## 2025-03-20 ASSESSMENT — PAIN - FUNCTIONAL ASSESSMENT
PAIN_FUNCTIONAL_ASSESSMENT: 0-10
PAIN_FUNCTIONAL_ASSESSMENT: PREVENTS OR INTERFERES SOME ACTIVE ACTIVITIES AND ADLS
PAIN_FUNCTIONAL_ASSESSMENT: PREVENTS OR INTERFERES SOME ACTIVE ACTIVITIES AND ADLS

## 2025-03-20 ASSESSMENT — PAIN DESCRIPTION - PAIN TYPE
TYPE: SURGICAL PAIN

## 2025-03-20 ASSESSMENT — PAIN DESCRIPTION - ONSET
ONSET: SUDDEN
ONSET: ON-GOING
ONSET: SUDDEN

## 2025-03-20 NOTE — RT PROTOCOL NOTE
Medicine and Critical Care Committee.    VIII. Outcome Criteria:  Outcome criteria for bronchial hygiene therapy should include:  Decrease in sputum production  Improved breath sounds  Improved arterial oxygen tension and/or SaO2  Improved chest X-ray  Subjective response to therapy    IX. Documentation  Document assessment findings in the respiratory assessment section of the patient's EMR.  Document changes in therapy per protocol in the respiratory orders section and in the care plan section of the patient's EMR.  Document patient education in the patient education section of the patient's EMR.      X. Related Protocols:  Respiratory Patient Care Assessment Protocols  Aerosolized Medication Protocol  Oxygen Therapy Protocol        Reference:    L - Respiratory Care Department Policy, Procedure and Protocol Guideline Manual, 1995, TAZ Espinal.  L - Therapist Driven Respiratory Care Protocols - A Practitioner's Guide for Criteria-Based         Respiratory Care by Tye Dangelo M.D., and TAZ Espinal RN, RRT.  N - [facility] Clinical Practice Guidelines.  LEO Antunez., HEMANT Gerardo., JONAS Badillo., SIMON Butcher., O'Mallorie C., FAISAL saucedo., . . . MARLENY Huffman. (2013, December). [facility] Clinical Practice Guideline: Effectiveness of Nonpharmacologic Airway Clearance Therapies in Hospitalized Patients. Respiratory Care, 58(12), 0568-5468. Retrieved June 28, 2019      Respiratory Care Services Policy Number: [id number]-    Title: Aerosolized Medication Protocol    Effective Date: 10/1998    Revised Date: 06/13, 03/16, 11/17, 07/19     Reviewed Date: 05/14/ 03/15 , 06/17, 5/18, 11/2020   I.  Policy: The Aerosolized Medication Protocol shall by implemented by Respiratory Care Practitioners (RCP) for patients with orders to receive aerosol therapy with medication.     II. Purpose:  To open and maintain obstructed airways, the RCP, will utilize the following   protocol to select the indicated aerosolized medication(s) and determine  care plan section of the patient's EMR.  Document patient education in the patient education section of the patient's EMR.  X. Outcome Criteria:  Relief of wheezes and obstruction  Improved cough and sputum color and consistency  Improved chest x-ray  Improved arterial oxygen tension and or SaO2  Improved Peak Flow on asthmatic patients        XI. Related Policy/Protocols:  Respiratory Patient Care Protocols  Bronchial Hygiene Therapy  Oxygen Protocol  [facility] Administration of Metered Dose Inhalers via a Common Canister  [facility] Clinical Resources   [facility] Aerosol Generating Procedures  Reference:  L - Respiratory Care Department Policy, Procedure and Protocol Guideline Manual, 1995, Jace.  L -  Therapist Driven Respiratory Care Protocols - A Practitioner's Guide for Criteria-Based Respiratory Care by Tye Dangelo M.D., and TAZ Espinal RN, RRT.  L - The rationale for therapist-driven protocols: an update. Respiratory Care 1998; 43:719-723.  N -[facility] Clinical Practice Guidelines.

## 2025-03-20 NOTE — ANESTHESIA POSTPROCEDURE EVALUATION
Department of Anesthesiology  Postprocedure Note    Patient: Tameka Johnson  MRN: [id number]  YOB: 1960  Date of evaluation: 3/20/2025    Procedure Summary       Date: 03/20/25 Room / Location: [facility] MAIN OR 08 / [facility] MAIN OR    Anesthesia Start: 0652 Anesthesia Stop: 0859    Procedure: HIP TOTAL ARTHROPLASTY-RIGHT SDD (Right: Hip) Diagnosis:       Primary osteoarthritis of right hip      (Primary osteoarthritis of right hip [M16.11])    Surgeons: Deyvi Austin MD Responsible Provider: Janusz Rosario MD    Anesthesia Type: general ASA Status: 2            Anesthesia Type: No value filed.    Tanya Phase I: Tanya Score: 7    Tanya Phase II:      Anesthesia Post Evaluation    Patient location during evaluation: PACU  Patient participation: complete - patient participated  Level of consciousness: awake and alert  Airway patency: patent  Nausea & Vomiting: no nausea and no vomiting  Cardiovascular status: hemodynamically stable  Respiratory status: acceptable, nonlabored ventilation and spontaneous ventilation  Hydration status: euvolemic  Comments: /78   Pulse 78   Temp 98.1 °F (36.7 °C) (Temporal)   Resp 16   Ht 1.524 m (5')   Wt 89.5 kg (197 lb 5 oz)   SpO2 96%   BMI 38.53 kg/m²     Multimodal analgesia pain management approach  Pain management: adequate and satisfactory to patient    No notable events documented.

## 2025-03-20 NOTE — PROGRESS NOTES
Spiritual care and pre-op prayer given to patient.    Larissa Schultz M.Div, [facility] / / Bereavement Coordinator  Spiritual Care Department   c: [phone]/ [phone] / [email or web address]     [address] [address]  [email or web address]

## 2025-03-20 NOTE — PROGRESS NOTES
ACUTE PHYSICAL THERAPY GOALS:   (Developed with and agreed upon by patient and/or caregiver.)  GOALS (1-4 days):  (1.)Ms. Johnson will move from supine to sit and sit to supine  in bed with SUPERVISION.    (2.)Ms. Johnson will transfer from bed to chair and chair to bed with SUPERVISION using the least restrictive device.    (3.)Ms. Johnson will ambulate with SUPERVISION for 150 feet with the least restrictive device.   (4.)Ms. Johnson will ambulate up/down 2 steps with left railing with MINIMAL ASSIST.  (5.)Ms. Johnson will state/observe LARRY precautions with 0 verbal cues.  ________________________________________________________________________________________________      PHYSICAL THERAPY: TOTAL HIP ARTHROPLASTY Initial Assessment and PM  (Link to Caseload Tracking: PT Visit Days : 1  Acknowledge Orders  Time In/Out  PT Charge Capture  Rehab Caseload Tracker  Episode   Tameka Johnson is a 64 y.o. female   PRIMARY DIAGNOSIS: Primary osteoarthritis of right hip  Primary osteoarthritis of right hip [M16.11]  Procedure(s) (LRB):  HIP TOTAL ARTHROPLASTY-RIGHT SDD (Right)  * Day of Surgery *  Reason for Referral: Pain in Right Hip (M25.551)  Stiffness of Right Hip, Not elsewhere classified (M25.651)  Difficulty in walking, Not elsewhere classified (R26.2)  Outpatient in a bed: Payor:  [facility] / Plan:  [facility] / Product Type: *No Product type* /     REHAB RECOMMENDATIONS:   Recommendation to date pending progress:  Setting:  Home Health Therapy    Equipment:     Has RW     GAIT: I Mod I S SBA CGA Min Mod Max Total  NT x2 Comments:   Level of Assistance [] [] [] [] [x] [] [] [] [] [] []    Weightbearing Status  Right Lower Extremity Weight Bearing: Weight Bearing As Tolerated    Distance  25 feet    Gait Quality Decreased jacinda , Decreased step length, and Decreased stance    DME Gait Belt and Rolling Walker     Stairs      Ramp     I=Independent, Mod I=Modified Independent, S=Supervision,

## 2025-03-20 NOTE — PERIOP NOTE
Pt status reviewed with Dr. Rosario.  MD states ok to transfer out of pacu when pacu time is complete.

## 2025-03-20 NOTE — CARE COORDINATION
Patient is a 64 y.o. year old female admitted for Right LARRY . Patient plans to return home on discharge. Order received to arrange home health. Patient requesting [facility] due to a prior positive experience.  Referral sent to [facility].   Patient denies any equipment needs as patient has a walker.  Will follow until discharge.        03/20/25 1114   Service Assessment   Patient Orientation Alert and Oriented   Cognition Alert   History Provided By Patient   Primary Caregiver Self   Services At/After Discharge   Transition of Care Consult (CM Consult) Home Health   Internal Home Health No   Reason Outside Agency Chosen Patient already serviced by other home care/hospice agency   Services At/After Discharge Home Health;PT   Mode of Transport at Discharge Self   Confirm Follow Up Transport Self   Condition of Participation: Discharge Planning   The Plan for Transition of Care is related to the following treatment goals: improve mobility   The Patient and/or Patient Representative was provided with a Choice of Provider? Patient   The Patient and/Or Patient Representative agree with the Discharge Plan? Yes   Freedom of Choice list was provided with basic dialogue that supports the patient's individualized plan of care/goals, treatment preferences, and shares the quality data associated with the providers?  Yes

## 2025-03-20 NOTE — INTERVAL H&P NOTE
Update History & Physical    The patient's History and Physical of March 14, 2025 was reviewed with the patient and I examined the patient. There was no change. The surgical site was confirmed by the patient and me.     Plan: The risks, benefits, expected outcome, and alternative to the recommended procedure have been discussed with the patient. Patient understands and wants to proceed with the procedure.     Electronically signed by JAS APARICIO MD on 3/20/2025 at 6:30 AM

## 2025-03-20 NOTE — OP NOTE
[facility]  Total Hip Procedure Note      Name: Tameka Johnson  YOB: 1960  Gender: female  MRN: [id number]    Pre-operative Diagnosis:  Primary osteoarthritis of right hip [M16.11]  Post-operative Diagnosis: Primary osteoarthritis of right hip [M16.11]    Surgeon: JAS APARICIO MD  Assistant:Hari Llanos CFA    This surgical assistant was present for the procedure and vital for the performance of the procedure. He assisted with exposure and retraction during the procedure as well as wound closure and dressing application after the procedure.       Anesthesia: Spinal      Procedure: Total Hip Arthroplasty   The complexity of the total joint surgery requires the use of a first assistant for positioning, retraction and assistance in closure. The patient's Body mass index is 38.53 kg/m²., BMI's greater then 40 make surgical exposure and retraction extremely difficult and increase operative time.    Tameka Johnson was brought to the operating room and positioned on the operating table.  She was anesthestized .   IV antibiotics per CMS protocol were administered. Prior to the incision being made a timeout was called identifying the patient, procedure ,operative side and surgeon.     Tameka Johnson was positioned in the lateral decubitus position with the  right  side up.The limb was prepped and draped in the usual sterile fashion.  The direct lateral approach was utilized to expose the hip.  The incision was carried through the subcutaneous tissue and underlying fascia with homeostasis obtained using the bovie cauterization.  A Charnley retractor was inserted.  The abductors were taken down at the junction of the anterior and middle third.  The sciatic nerve was palpated and identified.  Following comparison of leg lengths, the femoral head was dislocated.  The neck was osteotomized in the appropriate position just above the lesser trochanteric region.

## 2025-03-20 NOTE — PERIOP NOTE
TRANSFER - OUT REPORT:    Verbal report given to SONNY Patino on Tameka Johnson  being transferred to [facility] for routine post-op       Report consisted of patient's Situation, Background, Assessment and   Recommendations(SBAR).     Information from the following report(s) Nurse Handoff Report and Cardiac Rhythm SR  was reviewed with the receiving nurse.           Lines:   Peripheral IV 03/20/25 Left;Posterior Hand (Active)   Site Assessment Clean, dry & intact 03/20/25 0854   Line Status Infusing 03/20/25 0854   Line Care Connections checked and tightened 03/20/25 0717   Phlebitis Assessment No symptoms 03/20/25 0854   Infiltration Assessment 0 03/20/25 0854   Alcohol Cap Used No 03/20/25 0854   Dressing Status Clean, dry & intact 03/20/25 0854   Dressing Type Transparent 03/20/25 0854   Dressing Intervention New 03/20/25 0717        Opportunity for questions and clarification was provided.      Patient transported with:  O2 @ 3lpm

## 2025-03-20 NOTE — ANESTHESIA PRE PROCEDURE
Department of Anesthesiology  Preprocedure Note       Name:  Tameka Johnson   Age:  64 y.o.  :  1960                                          MRN:  [id number]         Date:  3/20/2025      Surgeon: Surgeon(s):  Deyvi Austin MD    Procedure: Procedure(s):  HIP TOTAL ARTHROPLASTY-RIGHT SDD    Medications prior to admission:   Prior to Admission medications    Medication Sig Start Date End Date Taking? Authorizing Provider   acetaminophen (TYLENOL) 500 MG tablet Take 2 tablets by mouth every 6 hours as needed for Pain   Yes ProviderAmanda MD   citalopram (CELEXA) 10 MG tablet Take 1 tablet by mouth nightly 10/21/24 10/21/25 Yes Amanda Blackman MD   SYNTHROID 150 MCG tablet Take 1 tablet by mouth Daily 10/11/24  Yes Amanda Blackman MD   buPROPion (WELLBUTRIN SR) 150 MG extended release tablet Take 1 tablet by mouth daily 3/21/24  Yes Amanda Blackman MD   estrogens, conjugated,-methylTESTOSTERone (ESTRATEST HS) 0.625-1.25 MG per tablet Take 1 tablet by mouth at bedtime.   Yes ProviderAmanda MD   esomeprazole (NEXIUM) 40 MG delayed release capsule Take 1 capsule by mouth every morning (before breakfast) 1/25/24 3/20/25 Yes ProviderAmanda MD   famotidine (PEPCID) 20 MG tablet Take 1 tablet by mouth nightly 1/25/24 3/20/25 Yes ProviderAmanda MD   medroxyPROGESTERone (PROVERA) 2.5 MG tablet Take 1 tablet by mouth at bedtime 24  Yes ProviderAmanda MD   celecoxib (CELEBREX) 200 MG capsule Take 1 capsule by mouth 2 times daily as needed for Pain 10/28/22  Yes Todd Alas, APRN - CNP   fluticasone (FLONASE) 50 MCG/ACT nasal spray 1 spray by Nasal route as needed for Allergies 4/30/15  Yes Automatic Reconciliation, Ar   aspirin 81 MG EC tablet Take 1 tablet by mouth 2 times daily  Patient not taking: Reported on 3/20/2025 3/18/25   Deyvi Austin MD   celecoxib (CELEBREX) 200 MG capsule Take 1 capsule by mouth daily  Patient not taking: 
- - -

## 2025-03-20 NOTE — PROGRESS NOTES
OCCUPATIONAL THERAPY Initial Assessment, Daily Note, and PM      (Link to Caseload Tracking: OT Visit Days: 1  OT Orders   Time  OT Charge Capture  Rehab Caseload Tracker  Episode     Tameka Johnson is a 64 y.o. female   PRIMARY DIAGNOSIS: Primary osteoarthritis of right hip  Primary osteoarthritis of right hip [M16.11]  Procedure(s) (LRB):  HIP TOTAL ARTHROPLASTY-RIGHT SDD (Right)  * Day of Surgery *  Reason for Referral: Pain in Right Hip (M25.551)  Stiffness of Right Hip, Not elsewhere classified (M25.651)  Outpatient in a bed: Payor:  EAST / Plan: [facility] / Product Type: *No Product type* /     ASSESSMENT:     REHAB RECOMMENDATIONS:   Recommendation to date pending progress:  Setting:  No further skilled occupational therapy after discharge from hospital    Equipment:    None     ASSESSMENT:  Ms. Johnson is s/p right LARRY and presents with decreased independence with functional mobility and activities of daily living as compared to baseline level of function and safety. Patient would benefit from skilled Occupational Therapy to maximize independence and safety with self-care task and functional mobility. Pt does have L foot drop and has AFO. Today, she opted to not put AFO on, she is able to manage foot drop well without.   Patient able to complete  lower body dressing, upper body dressing, toileting, and grooming at bathroom with minimal assist .  Mobilized from hospital bed to bathroom using a rolling walker with contact guard assist. Pt had episodes of feeling dizzy and nauseous while up and moving. Assisted getting pt set up in recliner which she felt better. Pt reports she gets nauseous often.  Patient is hopeful to spend the night to better prepare for safe discharge home. Pt will be seen for full ADL session tomorrow AM.        [facility] AM-PAC™ “6 Clicks” Daily Activity Inpatient Short Form:     AM-PAC Daily Activity - Inpatient   How much help is needed for  2 times/week for duration of hospital stay or until stated goals are met, whichever comes first.    ACUTE OCCUPATIONAL THERAPY GOALS:   (Developed with and agreed upon by patient and/or caregiver.)    GOALS:   DISCHARGE GOALS (in preparation for going home/rehab):  3 days  1.  Ms. Johnson will perform lower body dressing activity with minimal assist required to demonstrate improved functional mobility and safety.     2.  Ms. Johnson will perform bathing activity with minimal assist required to demonstrate improved functional mobility and safety.  3.  Ms. Johnson will perform toileting activity with  stand by assist to demonstrate improved functional mobility and safety.  4.  Ms. Johnson will perform all functional transfers transfer with stand by assist to demonstrate improved functional mobility and safety.      PROBLEM LIST:   (Skilled intervention is medically necessary to address:)  Decreased ADL/Functional Activities  Decreased Activity Tolerance  Decreased Strength  Decreased Transfer Abilities  Increased Pain   INTERVENTIONS PLANNED:   (Benefits and precautions of occupational therapy have been discussed with the patient.)  Self Care Training  Therapeutic Activity  Therapeutic Exercise/HEP  Neuromuscular Re-education  Manual Therapy  Education         TREATMENT:     EVALUATION: LOW COMPLEXITY: (Untimed Charge)    TREATMENT:   Self Care (10 minutes): Patient participated in toileting, upper body dressing, lower body dressing, grooming, functional mobility, bed mobility, functional transfer, bathroom mobility, assistive device, and compensatory technique in unsupported sitting and standing with minimal visual, verbal, and tactile cueing to increase independence, increase activity tolerance, and increase safety awareness. The patient was educated on role of occupational therapy, compensatory technique during ADL , strategies to improve safety, proper use of assistive device, and transfer training and safety and

## 2025-03-20 NOTE — PROGRESS NOTES
4 Eyes Skin Assessment     NAME:  Tameka Johnson  YOB: 1960  MEDICAL RECORD NUMBER:  [id number]    The patient is being assessed for  Admission    I agree that at least one RN has performed a thorough Head to Toe Skin Assessment on the patient. ALL assessment sites listed below have been assessed.      Areas assessed by both nurses:    Head, Face, Ears, Shoulders, Back, Chest, Arms, Elbows, Hands, Sacrum. Buttock, Coccyx, Ischium, and Legs. Feet and Heels        Does the Patient have a Wound? No noted wound(s)       Liam Prevention initiated by RN: Yes  Wound Care Orders initiated by RN: No    Pressure Injury (Stage 3,4, Unstageable, DTI, NWPT, and Complex wounds) if present, place Wound referral order by RN under : No    New Ostomies, if present place, Ostomy referral order under : No     Nurse 1 eSignature: Electronically signed by Carey Silva RN on 3/20/25 at 10:14 AM EDT    **SHARE this note so that the co-signing nurse can place an eSignature**    Nurse 2 eSignature: Electronically signed by Gladis Willoughby RN on 3/20/25 at 3:09 PM EDT

## 2025-03-20 NOTE — PERIOP NOTE
Dr. Rosario notified of patient's pain 7/10 after IV dilaudid given as charted in MAR. I also reviewed patient's VS in PACU with MD.  MD recommends administering oxycodone now as ordered and a dose of IV labetalol.

## 2025-03-20 NOTE — DISCHARGE SUMMARY
[facility]  Total Joint Discharge Summary          Name: Tameka Johnson  YOB: 1960  Gender: female  MRN: [id number]            Admit date: 3/20/2025  Discharge date and time: 03/20/25   Admitting Physician: Deyvi Austin MD  Surgeon: Same  Admission Diagnoses: Primary osteoarthritis of right hip [M16.11]  Discharge Diagnoses: Principal Problem:    Primary osteoarthritis of right hip  Resolved Problems:    * No resolved hospital problems. *    Procedure: Procedure(s) (LRB):  HIP TOTAL ARTHROPLASTY-RIGHT SDD (Right)                           Perioperative Antibiotics: Ancef 1 to 3 g was given depending on patient's weight. If allergic to Ancef or due to other indications, patient was given Vancomycin/Gent per protocol      Hospital Medications given:   [START ON 3/21/2025] buPROPion, 150 mg, Daily  citalopram, 10 mg, Nightly  [START ON 3/21/2025] levothyroxine, 150 mcg, Daily  sodium chloride flush, 5-40 mL, 2 times per day  ceFAZolin (ANCEF) IV, 2,000 mg, Q8H  acetaminophen, 650 mg, Q6H  sennosides-docusate sodium, 1 tablet, BID  aspirin, 81 mg, BID      sodium chloride  sodium chloride      albuterol, 2.5 mg, Q4H PRN  sodium chloride flush, 5-40 mL, PRN  sodium chloride, , PRN  oxyCODONE, 5 mg, Q4H PRN   Or  oxyCODONE, 10 mg, Q4H PRN  HYDROmorphone, 0.5 mg, Q3H PRN   Or  HYDROmorphone, 1 mg, Q3H PRN  promethazine, 25 mg, Q6H PRN   Or  ondansetron, 4 mg, Q6H PRN  aluminum & magnesium hydroxide-simethicone, 15 mL, Q6H PRN  diphenhydrAMINE, 25 mg, Q6H PRN   Or  diphenhydrAMINE, 25 mg, Q6H PRN        Discharge Medications given:  Current Discharge Medication List        CONTINUE these medications which have NOT CHANGED    Details   acetaminophen (TYLENOL) 500 MG tablet Take 2 tablets by mouth every 6 hours as needed for Pain      aspirin 81 MG EC tablet Take 1 tablet by mouth 2 times daily  Qty: 60 tablet, Refills: 0    Associated Diagnoses: Osteoarthritis of right hip,

## 2025-03-20 NOTE — DISCHARGE INSTRUCTIONS
[facility]      Patient Discharge Instructions    Tameka Johnson/[id number] : 1960    Admitted 3/20/2025 Discharged: 3/20/2025       IF YOU HAVE ANY PROBLEMS ONCE YOU ARE AT HOME CALL THE FOLLOWING NUMBERS:   Main office number: [phone]      Medications    The medications you are to continue on are listed on the medication reconciliation sheet.   Narcotic pain medications as well as supplemental iron can cause constipation. If this occurs try stopping the narcotic pain medication and/or the iron.   It is important that you take the medication exactly as they are prescribed.  Medications which increase your risk of blood clots are listed to stop for 5 weeks after surgery as well as medications or supplements which increase your risk of bleeding complications.   Keep your medication in the bottles provided by the pharmacist and keep a list of the medication names, dosages, and times to be taken in your wallet.   Do not take other medications without consulting your doctor.       Important Information    Do NOT smoke as this will greatly increase your risk of infection!    Resume your prehospital diet. If you have excessive nausea or vomitting call your doctor's office     Leg swelling and warmth is normal for 6 months after surgery. If you experience swelling in your leg elevate you leg while laying down with your toes above your heart. If you have sudden onset severe swelling with leg pain call our office. Use Andres Hose stockings until we see you in the office for your follow up appointment.    The stitches deep inside take approximately 6 months to dissolve. There will be sharp shooting, stinging and burning pain. This is normal and will resolve between 3-6 months after surgery.     Difficulty sleeping is normal following total Knee and Hip replacement. You may try melatonin, an over-the-counter sleep aid or benadryl to help with sleep. Most patients will resume sleeping through  in a letter \"L\").     Sleep on your back with your legs slightly apart or on your side with a pillow between your knees for about 6 weeks or as your doctor tells you. Do not sleep on your stomach or affected leg.     Ask your doctor when you can drive again.     Most people are able to return to work 4 weeks to 4 months after surgery.     Ask your doctor when it is okay for you to have sex.   Diet    By the time you leave the hospital, you will probably be eating your normal diet. Your doctor may recommend that you take iron and vitamin supplements.     Drink plenty of fluids (unless your doctor tells you not to).     Eat healthy foods, and watch your portion sizes. Try to stay at your ideal weight. Too much weight puts more stress on your new hip joint.     You may notice that your bowel movements are not regular right after your surgery. This is common. Try to avoid constipation and straining with bowel movements. You may want to take a fiber supplement every day. If you have not had a bowel movement after a couple of days, ask your doctor about taking a mild laxative.   Medicines    Your doctor will tell you if and when you can restart your medicines. You will also get instructions about taking any new medicines.     If you stopped taking aspirin or some other blood thinner, your doctor will tell you when to start taking it again.     Your doctor may give you a blood-thinning medicine to prevent blood clots. If you take a blood thinner, be sure you get instructions about how to take your medicine safely. Blood thinners can cause serious bleeding problems. This medicine could be in pill form or as a shot (injection). If a shot is necessary, your doctor will tell you how to do this.     Be safe with medicines. Take pain medicines exactly as directed.  If the doctor gave you a prescription medicine for pain, take it as prescribed.  If you are not taking a prescription pain medicine, ask your doctor if you can take

## 2025-03-20 NOTE — PROGRESS NOTES
TRANSFER - IN REPORT:    Verbal report received from Marianela DENNIS on aTmeka Johnson  being received from PACU for routine progression of patient care      Report consisted of patient's Situation, Background, Assessment and   Recommendations(SBAR).     Information from the following report(s) Nurse Handoff Report, Adult Overview, Surgery Report, Intake/Output, and MAR was reviewed with the receiving nurse.    Opportunity for questions and clarification was provided.      Assessment completed upon patient's arrival to unit and care assumed.

## 2025-03-21 VITALS
DIASTOLIC BLOOD PRESSURE: 98 MMHG | SYSTOLIC BLOOD PRESSURE: 110 MMHG | BODY MASS INDEX: 38.74 KG/M2 | RESPIRATION RATE: 18 BRPM | HEIGHT: 60 IN | WEIGHT: 197.31 LBS | HEART RATE: 87 BPM | TEMPERATURE: 98.1 F | OXYGEN SATURATION: 91 %

## 2025-03-21 PROCEDURE — 97535 SELF CARE MNGMENT TRAINING: CPT

## 2025-03-21 PROCEDURE — 6370000000 HC RX 637 (ALT 250 FOR IP): Performed by: ORTHOPAEDIC SURGERY

## 2025-03-21 PROCEDURE — 97530 THERAPEUTIC ACTIVITIES: CPT

## 2025-03-21 RX ADMIN — SENNOSIDES, DOCUSATE SODIUM 1 TABLET: 50; 8.6 TABLET, FILM COATED ORAL at 08:49

## 2025-03-21 RX ADMIN — OXYCODONE 10 MG: 5 TABLET ORAL at 03:55

## 2025-03-21 RX ADMIN — LEVOTHYROXINE SODIUM 150 MCG: 0.1 TABLET ORAL at 05:26

## 2025-03-21 RX ADMIN — BUPROPION HYDROCHLORIDE 150 MG: 150 TABLET, EXTENDED RELEASE ORAL at 08:49

## 2025-03-21 RX ADMIN — ASPIRIN 81 MG: 81 TABLET, COATED ORAL at 08:49

## 2025-03-21 RX ADMIN — ACETAMINOPHEN 650 MG: 325 TABLET ORAL at 05:25

## 2025-03-21 RX ADMIN — OXYCODONE 5 MG: 5 TABLET ORAL at 08:54

## 2025-03-21 ASSESSMENT — PAIN DESCRIPTION - ORIENTATION
ORIENTATION: RIGHT
ORIENTATION: RIGHT

## 2025-03-21 ASSESSMENT — PAIN DESCRIPTION - PAIN TYPE
TYPE: SURGICAL PAIN
TYPE: SURGICAL PAIN

## 2025-03-21 ASSESSMENT — PAIN DESCRIPTION - DESCRIPTORS
DESCRIPTORS: DISCOMFORT
DESCRIPTORS: DISCOMFORT;SORE

## 2025-03-21 ASSESSMENT — PAIN DESCRIPTION - LOCATION
LOCATION: HIP
LOCATION: HIP

## 2025-03-21 ASSESSMENT — PAIN DESCRIPTION - ONSET
ONSET: ON-GOING
ONSET: ON-GOING

## 2025-03-21 ASSESSMENT — PAIN SCALES - GENERAL
PAINLEVEL_OUTOF10: 4
PAINLEVEL_OUTOF10: 6
PAINLEVEL_OUTOF10: 6
PAINLEVEL_OUTOF10: 3

## 2025-03-21 ASSESSMENT — PAIN DESCRIPTION - FREQUENCY
FREQUENCY: INTERMITTENT
FREQUENCY: INTERMITTENT

## 2025-03-21 NOTE — PROGRESS NOTES
ACUTE PHYSICAL THERAPY GOALS:   (Developed with and agreed upon by patient and/or caregiver.)  GOALS (1-4 days):  (1.)Ms. Johnson will move from supine to sit and sit to supine  in bed with SUPERVISION.    (2.)Ms. Johnson will transfer from bed to chair and chair to bed with SUPERVISION using the least restrictive device.  met  (3.)Ms. Johnson will ambulate with SUPERVISION for 150 feet with the least restrictive device. met  (4.)Ms. Johnson will ambulate up/down 2 steps with left railing with MINIMAL ASSIST. Met with cane  (5.)Ms. Johnson will state/observe LARRY precautions with 0 verbal cues.  ________________________________________________________________________________________________      PHYSICAL THERAPY: TOTAL HIP ARTHROPLASTY Daily Note and AM  (Link to Caseload Tracking: PT Visit Days : 2  Acknowledge Orders  Time In/Out  PT Charge Capture  Rehab Caseload Tracker  Episode   Tameka Johnson is a 64 y.o. female   PRIMARY DIAGNOSIS: Primary osteoarthritis of right hip  Primary osteoarthritis of right hip [M16.11]  Procedure(s) (LRB):  HIP TOTAL ARTHROPLASTY-RIGHT SDD (Right)  1 Day Post-Op  Reason for Referral: Pain in Right Hip (M25.551)  Stiffness of Right Hip, Not elsewhere classified (M25.651)  Difficulty in walking, Not elsewhere classified (R26.2)  Outpatient in a bed: Payor:  EAST / Plan:  [facility] / Product Type: *No Product type* /     REHAB RECOMMENDATIONS:   Recommendation to date pending progress:  Setting:  Home Health Therapy    Equipment:     Has RW     GAIT: I Mod I S SBA CGA Min Mod Max Total  NT x2 Comments:   Level of Assistance [] [] [x] [x] [] [] [] [] [] [] []    Weightbearing Status  Right Lower Extremity Weight Bearing: Weight Bearing As Tolerated    Distance  150 feet    Gait Quality Decreased jacinda , Decreased step length, and Decreased stance    DME Rolling Walker and AFO on left      Stairs Stairs/Curb  Stairs?: Yes  Stairs  # Steps : 3  Rails: Right    B = bilateral; AA = active assistive; A = active; P = passive      Educated patient and/or family/caregiver on the following: Adaptive Equipment as Needed, Cryocuff/Icepacks, D/C Instruction Review, Fall Precautions, Home Exercises, and Walker Management/Safety    Interdisciplinary Patient Education   (Reference education tab)    AFTER TREATMENT PRECAUTIONS: Bed/Chair Locked, Call light within reach, Chair, and Needs within reach    INTERDISCIPLINARY COLLABORATION:  RN/ PCT    Compliance with Program/Exercises: compliant most of the time, Will assess as treatment progresses.    Recommendations/Intent for next treatment session:  Treatment next visit will focus on increasing Ms. Johnson's independence with bed mobility, transfers, gait training, strength/ROM exercises, modalities for pain, and patient education.     TIME IN/OUT:  Time In: 0930  Time Out: 0955  Minutes: 25    PHILLIP CHIN, PT

## 2025-03-21 NOTE — PROGRESS NOTES
Had therapy. Has medications for home already. Home health arranged for therapy. Has follow up appt scheduled. Given extra aquacel to take home. Discharge instructions given. Waiting for family.

## 2025-03-21 NOTE — PROGRESS NOTES
OCCUPATIONAL THERAPY Daily Note and AM      (Link to Caseload Tracking: OT Visit Days: 2  OT Orders   Time  OT Charge Capture  Rehab Caseload Tracker  Episode     Tameka Johnson is a 64 y.o. female   PRIMARY DIAGNOSIS: Primary osteoarthritis of right hip  Primary osteoarthritis of right hip [M16.11]  Procedure(s) (LRB):  HIP TOTAL ARTHROPLASTY-RIGHT SDD (Right)  1 Day Post-Op  Reason for Referral: Pain in Right Hip (M25.551)  Stiffness of Right Hip, Not elsewhere classified (M25.651)  Outpatient in a bed: Payor: [employer] [facility] / Plan: [employer] [facility] / Product Type: *No Product type* /     ASSESSMENT:     REHAB RECOMMENDATIONS:   Recommendation to date pending progress:  Setting:  No further skilled occupational therapy after discharge from hospital    Equipment:    None     ASSESSMENT:  Ms. Johnson is s/p R LARRY.  Patient completed shower and dressing as charted below in ADL grid and is ambulating with rolling walker and stand by assist.  Patient has met 4/4 goals and plans to return home with good family support.  Family able to provide patient with appropriate level of assistance at this time.  OT educated pt on bathing hygiene/safety, adaptive equipment as needed, compensatory strategies for LB ADLs, fall prevention strategies, safe use of AD, pt verbalized understanding. Pt to go home later today.       [facility] AM-PAC™ “6 Clicks” Daily Activity Inpatient Short Form:     AM-PAC Daily Activity - Inpatient   How much help is needed for putting on and taking off regular lower body clothing?: A Little  How much help is needed for bathing (which includes washing, rinsing, drying)?: A Little  How much help is needed for toileting (which includes using toilet, bedpan, or urinal)?: None  How much help is needed for putting on and taking off regular upper body clothing?: None  How much help is needed for taking care of personal grooming?: None  How much help for eating meals?: None  AM-PAC

## 2025-03-21 NOTE — PROGRESS NOTES
2025         Post Op day: 1 Day Post-Op     Admit Date: 3/20/2025  Admit Diagnosis: Primary osteoarthritis of right hip [M16.11]        Subjective: Doing well. No complaints. No SOB, No Chest Pain, No Nausea or Vomiting     Objective:   Vital Signs are Stable, No Acute Distress, Alert and Oriented, Dressing is Dry,  Neurovascular exam is normal.     Assessment / Plan :  Patient Active Problem List   Diagnosis    Total knee replacement status, left    Pure hypercholesterolemia    Acute bronchitis    Esophageal reflux    Cervical spinal stenosis    Stenosis of cervical spine    Hypersomnia due to medical condition    Irregular menstrual cycle    Chest pain    Persistent disorder of initiating or maintaining sleep    Synovial cyst of popliteal space    Osteoarthritis of spine    Arthritis of knee, left    Snoring    Palpitations    Mixed hyperlipidemia    Rosacea    Primary hypothyroidism    Vitamin D deficiency    Kyphosis    Cervical radiculopathy    Cervical disc herniation    S/P cervical spinal fusion    Spinal stenosis, lumbar region, with neurogenic claudication    Spondylolisthesis of lumbar region    Lumbar radiculopathy    S/P lumbar fusion    Primary osteoarthritis of right hip    Patient Vitals for the past 8 hrs:   BP Temp Pulse Resp SpO2   25 0701 (!) 110/98 98.1 °F (36.7 °C) 87 18 91 %    Temp (24hrs), Av.3 °F (36.8 °C), Min:98 °F (36.7 °C), Max:98.8 °F (37.1 °C)    Body mass index is 38.53 kg/m².    Lab Results   Component Value Date/Time    HGB 12.6 2025 10:31 AM      Pt seen by and discussed with Supervising Physician   Continue PT  WBAT  Aspirin for DVT prophylaxis  Home today       Signed By: SUKHDEV Quiles

## 2025-03-25 ENCOUNTER — TELEPHONE (OUTPATIENT)
Dept: ORTHOPEDIC SURGERY | Age: 65
End: 2025-03-25

## 2025-03-25 NOTE — TELEPHONE ENCOUNTER
They initiated therapy on Saturday. They will see her weekly. She needs to clarify orders, please give her a call.

## 2025-04-21 ENCOUNTER — OFFICE VISIT (OUTPATIENT)
Dept: ORTHOPEDIC SURGERY | Age: 65
End: 2025-04-21

## 2025-04-21 DIAGNOSIS — Z09 FOLLOW-UP EXAMINATION: ICD-10-CM

## 2025-04-21 DIAGNOSIS — Z96.641 S/P TOTAL RIGHT HIP ARTHROPLASTY: Primary | ICD-10-CM

## 2025-04-21 PROCEDURE — 99024 POSTOP FOLLOW-UP VISIT: CPT | Performed by: ORTHOPAEDIC SURGERY

## 2025-04-21 RX ORDER — HYDROCODONE BITARTRATE AND ACETAMINOPHEN 7.5; 325 MG/1; MG/1
1-2 TABLET ORAL EVERY 4 HOURS PRN
Qty: 60 TABLET | Refills: 0 | Status: SHIPPED | OUTPATIENT
Start: 2025-04-21 | End: 2025-04-28

## 2025-04-21 ASSESSMENT — PROMIS GLOBAL HEALTH SCALE
WHO IS THE PERSON COMPLETING THE PROMIS V1.1 SURVEY?: SELF
IN GENERAL, HOW WOULD YOU RATE YOUR SATISFACTION WITH YOUR SOCIAL ACTIVITIES AND RELATIONSHIPS [ON A SCALE OF 1 (POOR) TO 5 (EXCELLENT)]?: GOOD
IN THE PAST 7 DAYS, HOW WOULD YOU RATE YOUR PAIN ON AVERAGE [ON A SCALE FROM 0 (NO PAIN) TO 10 (WORST IMAGINABLE PAIN)]?: 6
IN THE PAST 7 DAYS, HOW OFTEN HAVE YOU BEEN BOTHERED BY EMOTIONAL PROBLEMS, SUCH AS FEELING ANXIOUS, DEPRESSED, OR IRRITABLE [ON A SCALE FROM 1 (NEVER) TO 5 (ALWAYS)]?: SOMETIMES
IN GENERAL, WOULD YOU SAY YOUR QUALITY OF LIFE IS...[ON A SCALE OF 1 (POOR) TO 5 (EXCELLENT)]: VERY GOOD
IN GENERAL, HOW WOULD YOU RATE YOUR PHYSICAL HEALTH [ON A SCALE OF 1 (POOR) TO 5 (EXCELLENT)]?: GOOD
TO WHAT EXTENT ARE YOU ABLE TO CARRY OUT YOUR EVERYDAY PHYSICAL ACTIVITIES SUCH AS WALKING, CLIMBING STAIRS, CARRYING GROCERIES, OR MOVING A CHAIR [ON A SCALE OF 1 (NOT AT ALL) TO 5 (COMPLETELY)]?: A LITTLE
IN GENERAL, PLEASE RATE HOW WELL YOU CARRY OUT YOUR USUAL SOCIAL ACTIVITIES (INCLUDES ACTIVITIES AT HOME, AT WORK, AND IN YOUR COMMUNITY, AND RESPONSIBILITIES AS A PARENT, CHILD, SPOUSE, EMPLOYEE, FRIEND, ETC) [ON A SCALE OF 1 (POOR) TO 5 (EXCELLENT)]?: POOR
IN GENERAL, HOW WOULD YOU RATE YOUR SATISFACTION WITH YOUR SOCIAL ACTIVITIES AND RELATIONSHIPS [ON A SCALE OF 1 (POOR) TO 5 (EXCELLENT)]?: GOOD
SUM OF RESPONSES TO QUESTIONS 3, 6, 7, & 8: 14
IN GENERAL, WOULD YOU SAY YOUR HEALTH IS...[ON A SCALE OF 1 (POOR) TO 5 (EXCELLENT)]: VERY GOOD
IN THE PAST 7 DAYS, HOW WOULD YOU RATE YOUR FATIGUE ON AVERAGE [ON A SCALE FROM 1 (NONE) TO 5 (VERY SEVERE)]?: MODERATE
IN GENERAL, PLEASE RATE HOW WELL YOU CARRY OUT YOUR USUAL SOCIAL ACTIVITIES (INCLUDES ACTIVITIES AT HOME, AT WORK, AND IN YOUR COMMUNITY, AND RESPONSIBILITIES AS A PARENT, CHILD, SPOUSE, EMPLOYEE, FRIEND, ETC) [ON A SCALE OF 1 (POOR) TO 5 (EXCELLENT)]?: POOR
WHO IS THE PERSON COMPLETING THE PROMIS V1.1 SURVEY?: SELF
IN THE PAST 7 DAYS, HOW WOULD YOU RATE YOUR PAIN ON AVERAGE [ON A SCALE FROM 0 (NO PAIN) TO 10 (WORST IMAGINABLE PAIN)]?: 6
IN THE PAST 7 DAYS, HOW WOULD YOU RATE YOUR FATIGUE ON AVERAGE [ON A SCALE FROM 1 (NONE) TO 5 (VERY SEVERE)]?: MODERATE
SUM OF RESPONSES TO QUESTIONS 2, 4, 5, & 10: 12
HOW IS THE PROMIS V1.1 BEING ADMINISTERED?: ELECTRONIC
TO WHAT EXTENT ARE YOU ABLE TO CARRY OUT YOUR EVERYDAY PHYSICAL ACTIVITIES SUCH AS WALKING, CLIMBING STAIRS, CARRYING GROCERIES, OR MOVING A CHAIR [ON A SCALE OF 1 (NOT AT ALL) TO 5 (COMPLETELY)]?: A LITTLE
IN GENERAL, HOW WOULD YOU RATE YOUR MENTAL HEALTH, INCLUDING YOUR MOOD AND YOUR ABILITY TO THINK [ON A SCALE OF 1 (POOR) TO 5 (EXCELLENT)]?: FAIR
HOW IS THE PROMIS V1.1 BEING ADMINISTERED?: ELECTRONIC
IN THE PAST 7 DAYS, HOW OFTEN HAVE YOU BEEN BOTHERED BY EMOTIONAL PROBLEMS, SUCH AS FEELING ANXIOUS, DEPRESSED, OR IRRITABLE [ON A SCALE FROM 1 (NEVER) TO 5 (ALWAYS)]?: SOMETIMES

## 2025-04-21 NOTE — PROGRESS NOTES
Name: Tameka Johnson  YOB: 1960  Gender: female  MRN: [id number]    RIGHT Post-Op LARRY: 4 weeks    This patient returns now 4 weeks s/p LARRY.  They are doing well.  There have been no significant issues since last visit.  Patient is anxious to increase level of activity.  Today they complain of no significant symptoms.      PE: On exam today, incision looks good.  The patient is ambulating with a steady gait with the use of CANE.  There is minimal discomfort with gentle range of motion of the operative hip.     RADIOGRAPHS:  AP pelvis and table lateral of the RIGHT hip which demonstrate well fixed implants in good position.  No sign of fracture or concern.    RADIOGRAPHIC IMPRESSION:  Stable RIGHTTHA.    CLINICAL IMPRESSION AND PLAN:  Now four weeks s/p RIGHT LARRY. I advised them to continue to wean from their current assistive device and to resume activities as tolerated.   Further activity was discussed with the patient as was further pain medications.  The patient will return in 4-5 months.    Work/Activity Restrictions: NOT APPLICABLE    SUKHDEV Quiles

## 2025-06-10 ENCOUNTER — OFFICE VISIT (OUTPATIENT)
Dept: ORTHOPEDIC SURGERY | Age: 65
End: 2025-06-10
Payer: OTHER GOVERNMENT

## 2025-06-10 DIAGNOSIS — R20.0 NUMBNESS AND TINGLING IN LEFT ARM: ICD-10-CM

## 2025-06-10 DIAGNOSIS — M89.8X1 PAIN OF LEFT SCAPULA: ICD-10-CM

## 2025-06-10 DIAGNOSIS — M25.512 LEFT SHOULDER PAIN, UNSPECIFIED CHRONICITY: Primary | ICD-10-CM

## 2025-06-10 DIAGNOSIS — R20.2 NUMBNESS AND TINGLING IN LEFT ARM: ICD-10-CM

## 2025-06-10 PROCEDURE — 99203 OFFICE O/P NEW LOW 30 MIN: CPT | Performed by: STUDENT IN AN ORGANIZED HEALTH CARE EDUCATION/TRAINING PROGRAM

## 2025-06-10 NOTE — PROGRESS NOTES
reported numbness.  Physical therapy is advised for the management of the scapular pain, focusing on shoulder movements, exercises, and soft tissue work on the shoulder blade. The potential benefits of dry needling were discussed, and she expressed interest in this treatment option.     Follow-up: Referral for nerve conduction study will be initiated.  PT referral in [place] was placed today.  Follow-up in 6 weeks.    Orders / medications today:   Orders Placed This Encounter    XR SHOULDER LEFT (MIN 2 VIEWS)     Left Shoulder: Gr[place], Outlet only     Standing Status:   Future     Number of Occurrences:   1     Expected Date:   6/10/2025     Expiration Date:   6/10/2026    Amb External Referral To Neurology     Referral Priority:   Routine     Referral Type:   Other     Referral Reason:   Specialty Services Required     Requested Specialty:   Neurology     Number of Visits Requested:   1    Amb External Referral To Physical Therapy     Referral Priority:   Routine     Referral Type:   Eval and Treat     Referral Reason:   Patient Preference     Requested Specialty:   Physical Therapy, Orthopedic     Number of Visits Requested:   1      Follow up: Return in about 6 weeks (around 7/22/2025).       The patient expressed understanding and agreed with the plan.     Arely Austin MD   Orthopaedics and Sports Medicine  [facility]     This document was created using voice recognition software so frequent mistakes are possible. For any concerns about the wording of this document, please contact its creator for further clarification.

## 2025-07-17 ENCOUNTER — OFFICE VISIT (OUTPATIENT)
Age: 65
End: 2025-07-17

## 2025-07-17 DIAGNOSIS — M54.14 THORACIC RADICULOPATHY: ICD-10-CM

## 2025-07-17 DIAGNOSIS — M54.9 MID BACK PAIN: ICD-10-CM

## 2025-07-17 DIAGNOSIS — Z98.1 STATUS POST LUMBAR SPINAL ARTHRODESIS: Primary | ICD-10-CM

## 2025-07-17 NOTE — PROGRESS NOTES
Name: Tameka Johnson  YOB: 1960  Gender: female  MRN: [id number]  Age: 64 y.o.    Chief Complaint: Lumbar spine surgery follow up    History of Present Illness:      Tameka Johnson  is here for 9 month follow up of her  ALIF and posterior lumbar fusion surgery.  She is also status post C3-T1 laminectomy and fusion surgery.  She is having some issues in both areas.  From her lumbar perspective, she did have a left-sided foot drop postoperatively and has done some therapy and made some gains in strength and function.  She is using an AFO at times.  Her back pain had essentially resolved but has had some increase in back pain since a recent total hip arthroplasty.  In regard to the cervical spine, she is experiencing pain radiating from the left posterior arm into the elbow and ulnar forearm.            Medications:       Current Outpatient Medications:     acetaminophen (TYLENOL) 500 MG tablet, Take 2 tablets by mouth every 6 hours as needed for Pain, Disp: , Rfl:     aspirin 81 MG EC tablet, Take 1 tablet by mouth 2 times daily, Disp: 60 tablet, Rfl: 0    celecoxib (CELEBREX) 200 MG capsule, Take 1 capsule by mouth daily, Disp: 30 capsule, Rfl: 0    ondansetron (ZOFRAN) 4 MG tablet, Take 1 tablet by mouth every 6 hours as needed for Nausea or Vomiting, Disp: 30 tablet, Rfl: 0    gabapentin (NEURONTIN) 300 MG capsule, Take 1 capsule by mouth 3 times daily for 90 days. Intended supply: 30 days (Patient not taking: Reported on 3/20/2025), Disp: 90 capsule, Rfl: 2    citalopram (CELEXA) 10 MG tablet, Take 1 tablet by mouth nightly, Disp: , Rfl:     SYNTHROID 150 MCG tablet, Take 1 tablet by mouth Daily, Disp: , Rfl:     methocarbamol (ROBAXIN) 750 MG tablet, Take 1 tablet by mouth every 8 hours as needed (spasm), Disp: 30 tablet, Rfl: 1    buPROPion (WELLBUTRIN SR) 150 MG extended release tablet, Take 1 tablet by mouth daily, Disp: , Rfl:     ALBUTEROL IN, Inhale into the lungs as

## 2025-07-28 ENCOUNTER — TELEPHONE (OUTPATIENT)
Age: 65
End: 2025-07-28

## 2025-07-28 NOTE — TELEPHONE ENCOUNTER
Referral was sent to [facility] spine [facility] pain. They called to schedule and appointment an patient stated having to take care of her mother at this time and will call back to schedule

## (undated) DEVICE — PACK PROCEDURE SURG POST LAMINECTOMY CDS

## (undated) DEVICE — SOLUTION IV 250ML 0.9% SOD CHL PH 5 INJ USP VIAFLX PLAS

## (undated) DEVICE — 3M™ STERI-STRIP™ REINFORCED ADHESIVE SKIN CLOSURES, R1548, 1 IN X 5 IN (25 MM X 125 MM), 4 STRIPS/ENVELOPE: Brand: 3M™ STERI-STRIP™

## (undated) DEVICE — STERILE SYNTHETIC POLYISOPRENE POWDER-FREE SURGICAL GLOVES WITH HYDROGEL COATING, SMOOTH FINISH, STRAIGHT FINGER: Brand: PROTEXIS

## (undated) DEVICE — RUBBERBAND FASTENING W1/8XL3IN 2 PER PK

## (undated) DEVICE — BIPOLAR SEALER 23-112-1 AQM 6.0: Brand: AQUAMANTYS ®

## (undated) DEVICE — SOLUTION IV 1000ML 0.9% SOD CHL

## (undated) DEVICE — YANKAUER,FLEXIBLE HANDLE,REGLR CAPACITY: Brand: MEDLINE INDUSTRIES, INC.

## (undated) DEVICE — DRAPE TWL SURG 16X26IN BLU ORB04] ALLCARE INC]

## (undated) DEVICE — SOLUTION IRRIG 1000ML 0.9% SOD CHL USP POUR PLAS BTL

## (undated) DEVICE — GLOVE ORANGE PI 8   MSG9080

## (undated) DEVICE — TOTAL KNEE DR RIDGEWAY: Brand: MEDLINE INDUSTRIES, INC.

## (undated) DEVICE — FORCEPS BPLR L210MM TIP L1.5 WRK L105MM STR BAYNT INSUL DISP

## (undated) DEVICE — PLEDGET VASC W3/16XL3/8IN THK1/16IN PTFE SFT

## (undated) DEVICE — MODULAR TAP: Brand: XIA 4.5 SYSTEM -  XIA CT

## (undated) DEVICE — GLOVE SURG SZ 8 L12IN FNGR THK79MIL GRN LTX FREE

## (undated) DEVICE — Device

## (undated) DEVICE — SUTURE STRATAFIX SYMMETRIC SZ 1 L18IN ABSRB VLT CT1 L36CM SXPP1A404

## (undated) DEVICE — CLOSURE SKIN FLX NONINVASIVE PRELOC TECHNOLOGY FOR 24IN

## (undated) DEVICE — SUTURE VCRL SZ 2-0 L27IN ABSRB UD L36MM CP-1 1/2 CIR REV J266H

## (undated) DEVICE — ZIP DS DRESSING SHIELD: Brand: ZIP DS DRESSING SHIELD

## (undated) DEVICE — SPONGE: SPECIALTY PEANUT XR 100/CS: Brand: MEDICAL ACTION INDUSTRIES

## (undated) DEVICE — HOOD: Brand: T7PLUS

## (undated) DEVICE — DRAPE SHT 3 QTR PROXIMA 53X77 --

## (undated) DEVICE — LIQUIBAND SECURE WOUND CLOSURE, 66CM: Brand: MEDLINE

## (undated) DEVICE — DRAPE MICSCP W51XL150IN FOR LEICA M680 WILD OHS

## (undated) DEVICE — SPONGE TONSIL DBL LG 0.625 IN

## (undated) DEVICE — SUIT SURG ISOLATN ZIP TOGA XL T7 +

## (undated) DEVICE — TUBE SUCTION FRAZIER 12 FRX5.5 IN MALL ANGLED W/ HOLE

## (undated) DEVICE — BNDG COHESIVE 4INX5YD COBAN --

## (undated) DEVICE — SYR LR LCK 1ML GRAD NSAF 30ML --

## (undated) DEVICE — DRAPE XR C ARM 41X74IN LF --

## (undated) DEVICE — SUTURE VICRYL + SZ 4-0 L27IN ABSRB UD PS-2 3/8 CIR REV CUT VCP426H

## (undated) DEVICE — TUBING, SUCTION, 1/4" X 10', STRAIGHT: Brand: MEDLINE

## (undated) DEVICE — 4.0MM PRECISION ROUND

## (undated) DEVICE — CARDINAL HEALTH FLEXIBLE LIGHT HANDLE COVER: Brand: CARDINAL HEALTH

## (undated) DEVICE — 3M™ TEGADERM™ TRANSPARENT FILM DRESSING FRAME STYLE, 1626W, 4 IN X 4-3/4 IN (10 CM X 12 CM), 50/CT 4CT/CASE: Brand: 3M™ TEGADERM™

## (undated) DEVICE — NEEDLE SPNL 22GA TRNSLUC YEL WIND HUB ANES FIT STYL DISP

## (undated) DEVICE — SUTURE ABS ANTIBACT 1-0 CTX 24IN STRATAFIX PDS+ SXPP1A445

## (undated) DEVICE — GLOVE SURG SZ 75 CRM LTX FREE POLYISOPRENE POLYMER BEAD ANTI

## (undated) DEVICE — X-RAY SPONGES,12 PLY: Brand: DERMACEA

## (undated) DEVICE — 3M™ IOBAN™ 2 ANTIMICROBIAL INCISE DRAPE 6651EZ: Brand: IOBAN™ 2

## (undated) DEVICE — SYRINGE MED 50ML LUERLOCK TIP

## (undated) DEVICE — TRAY PREP DRY W/ PREM GLV 2 APPL 6 SPNG 2 UNDPD 1 OVERWRAP

## (undated) DEVICE — 3M™ STERI-DRAPE™ INSTRUMENT POUCH 1018: Brand: STERI-DRAPE™

## (undated) DEVICE — ABSORBENT, WATERPROOF, BACTERIA PROOF FILM DRESSING: Brand: OPSITE POST OP 20X10CM CTN 20

## (undated) DEVICE — HEWSON SUTURE RETRIEVER: Brand: HEWSON SUTURE RETRIEVER

## (undated) DEVICE — DRESSING HYDROFIBER AQUACEL AG ADVANTAGE 3.5X10 IN

## (undated) DEVICE — T4 HOOD

## (undated) DEVICE — SUTURE VICRYL SZ 1 L27IN ABSRB UD L36MM CP-1 1/2 CIR REV CUT J268H

## (undated) DEVICE — SYSTEM SKIN CLSR 22CM DERMBND PRINEO

## (undated) DEVICE — TRAY,URINE METER,100% SILICONE,16FR10ML: Brand: MEDLINE

## (undated) DEVICE — GOWN,REINFORCED,POLY,SIRUS,XLNG/XXLG: Brand: MEDLINE

## (undated) DEVICE — POSTERIOR LAMI VANPLT-LUCAS: Brand: MEDLINE INDUSTRIES, INC.

## (undated) DEVICE — GOWN,REINF,POLY,ECL,PP SLV,XL: Brand: MEDLINE

## (undated) DEVICE — CASPAR DISTRACTION PIN 14MM: Brand: AESCULAP

## (undated) DEVICE — REM POLYHESIVE ADULT PATIENT RETURN ELECTRODE: Brand: VALLEYLAB

## (undated) DEVICE — (D)PREP SKN CHLRAPRP APPL 26ML -- CONVERT TO ITEM 371833

## (undated) DEVICE — MASTISOL ADHESIVE LIQ 2/3ML

## (undated) DEVICE — FOAM BUMP, LARGE: Brand: MEDLINE INDUSTRIES, INC.

## (undated) DEVICE — BLADE SAW PAT RMR PILT H 46MM --

## (undated) DEVICE — INTENDED FOR TISSUE SEPARATION, AND OTHER PROCEDURES THAT REQUIRE A SHARP SURGICAL BLADE TO PUNCTURE OR CUT.: Brand: BARD-PARKER SAFETY BLADES SIZE 15, STERILE

## (undated) DEVICE — GLOVE SURG SZ 65 THK91MIL LTX FREE SYN POLYISOPRENE

## (undated) DEVICE — DRILL: Brand: OASYS

## (undated) DEVICE — SUTURE ETHIBOND EXCEL SZ 2 L30IN NONABSORBABLE GRN L40MM V-37 MX69G

## (undated) DEVICE — ADHESIVE SKIN CLOSURE WND 8.661X1.5 IN 22 CM LIQUIBAND SECUR

## (undated) DEVICE — SUTURE MONOCRYL SZ 2-0 L27IN ABSRB UD CP-1 1 L36MM 1/2 CIR REV Y266H

## (undated) DEVICE — DRAPE,U/SHT,SPLIT,FILM,60X84,STERILE: Brand: MEDLINE

## (undated) DEVICE — UNIVERSAL DRAPES: Brand: MEDLINE INDUSTRIES, INC.

## (undated) DEVICE — STERILE PVP: Brand: MEDLINE INDUSTRIES, INC.

## (undated) DEVICE — BANDAGE COBAN 4 IN COMPR W4INXL5YD FOAM COHESIVE QUIK STK SELF ADH SFT

## (undated) DEVICE — SUTURE PERMAHAND SZ 2-0 L30IN NONABSORBABLE BLK SILK W/O A305H

## (undated) DEVICE — STERILE PRESSURE PROTECTOR PAD® FOR DE MAYO UNIVERSAL DISTRACTOR® (10/CASE): Brand: DE MAYO UNIVERSAL DISTRACTOR®

## (undated) DEVICE — PENCIL ES L3M BTTN SWCH HOLSTER W/ BLDE ELECTRD EDGE

## (undated) DEVICE — Z DISCONTINUED USE 2744636  DRESSING AQUACEL 14 IN ALG W3.5XL14IN POLYUR FLM CVR W/ HYDRCOLL

## (undated) DEVICE — SUTURE VCRL + 3-0 L27IN ABSRB UD PS-2 L19MM 3/8 CIR PRIM VCP427H

## (undated) DEVICE — SOLUTION IRRIG 3000ML 0.9% SOD CHL FLX CONT 0797208] ICU MEDICAL INC]

## (undated) DEVICE — SOLUTION IRRIG 3000ML 0.9% SOD CHL USP UROMATIC PLAS CONT

## (undated) DEVICE — INTENDED FOR TISSUE SEPARATION, AND OTHER PROCEDURES THAT REQUIRE A SHARP SURGICAL BLADE TO PUNCTURE OR CUT.: Brand: BARD-PARKER SAFETY BLADES SIZE 10, STERILE

## (undated) DEVICE — SUTURE MONOCRYL STRATAFIX SPRL + SZ 4-0 L12IN ABSRB UD PS-2 SXMP1B117

## (undated) DEVICE — C-ARM: Brand: UNBRANDED

## (undated) DEVICE — AGENT HEMOSTATIC SURGIFLOW MATRIX KIT W/THROMBIN

## (undated) DEVICE — DRAPE,TOP,102X53,STERILE: Brand: MEDLINE

## (undated) DEVICE — CORD ES L12FT BPLR FRCP

## (undated) DEVICE — SHEET,DRAPE,53X77,STERILE: Brand: MEDLINE

## (undated) DEVICE — CLOSURE SKIN FACILITATES COMPATIBILITY W/ CERTAIN IS DSG

## (undated) DEVICE — SUTURE PERMAHAND SZ 3-0 L30IN NONABSORBABLE BLK SILK BRAID A304H

## (undated) DEVICE — SUTURE PDS II SZ 1 L96IN ABSRB VLT TP-1 L65MM 1/2 CIR Z880G

## (undated) DEVICE — TOTAL HIP PACK: Brand: MEDLINE INDUSTRIES, INC.

## (undated) DEVICE — 3M™ IOBAN™ 2 ANTIMICROBIAL INCISE DRAPE 6650EZ: Brand: IOBAN™ 2

## (undated) DEVICE — APPLIER LIG CLP M L11IN TI STR RNG HNDL FOR 20 CLP DISP

## (undated) DEVICE — SUTURE VICRYL + SZ 1 L27IN ABSRB VLT CP-1 1/2 CIR REV CUT NDL VCP268H

## (undated) DEVICE — DUAL CUT SAGITTAL BLADE

## (undated) DEVICE — SUTURE ETHIBOND EXCEL SZ 5 L30IN NONABSORBABLE GRN L40MM V-37 MB66G

## (undated) DEVICE — WAX SURG 2.5GM HEMSTAT BNE BEESWAX PARAFFIN ISO PALMITATE

## (undated) DEVICE — SYRINGE CATH TIP 50ML

## (undated) DEVICE — NEEDLE SPNL 22GA L3.5IN BLK HUB S STL REG WALL FIT STYL

## (undated) DEVICE — SUT PROL 3-0 18IN PS2 BLU --

## (undated) DEVICE — INSULATED BLADE ELECTRODE: Brand: EDGE

## (undated) DEVICE — SUTURE STRATAFIX SPRL SZ 1 L14IN ABSRB VLT L48CM CTX 1/2 SXPD2B405

## (undated) DEVICE — SUTURE VICRYL SZ 1 L36IN ABSRB UD CTX L48MM 1/2 CIR J977H

## (undated) DEVICE — KENDALL SCD EXPRESS SLEEVES, KNEE LENGTH, MEDIUM: Brand: KENDALL SCD

## (undated) DEVICE — HANDPIECE SET WITH COAXIAL HIGH FLOW TIP AND SUCTION TUBE: Brand: INTERPULSE

## (undated) DEVICE — NEEDLE HYPO 21GA L1.5IN INTRAMUSCULAR S STL LATCH BVL UP

## (undated) DEVICE — STRYKER PERFORMANCE SERIES SAGITTAL BLADE: Brand: STRYKER PERFORMANCE SERIES

## (undated) DEVICE — DRILL BIT: Brand: AVIATOR

## (undated) DEVICE — SOLUTION IRRIG 1000ML STRL H2O USP PLAS POUR BTL

## (undated) DEVICE — SYR 50ML LR LCK 1ML GRAD NSAF --

## (undated) DEVICE — BLADE SAW W12.5XL70MM THK0.8MM CUT THK1.12MM S STL RECIP

## (undated) DEVICE — NEEDLE HYPO 18GA L1.5IN PNK S STL HUB POLYPR SHLD REG BVL

## (undated) DEVICE — ZIP 16 SURGICAL SKIN CLOSURE DEVICE: Brand: ZIP 16 SURGICAL SKIN CLOSURE DEVICE

## (undated) DEVICE — SUTURE VCRL SZ 1 L27IN ABSRB UD L36MM CP-1 1/2 CIR REV CUT J268H

## (undated) DEVICE — SUTURE VICRYL + SZ 2-0 L27IN ABSRB UD CP-1 1/2 CIR REV CUT VCP266H

## (undated) DEVICE — APPLIER CLP M/L SHFT DIA5MM 15 LIG LIGAMAX 5

## (undated) DEVICE — COVER,MAYO STAND,XL,STERILE: Brand: MEDLINE

## (undated) DEVICE — ABSORBENT, WATERPROOF, BACTERIA PROOF FILM DRESSING: Brand: OPSITE POST OP 9.5X8.5CM CTN 20

## (undated) DEVICE — DRILL BIT: Brand: XIA 4.5 SYSTEM -  XIA CT

## (undated) DEVICE — ELECTRODE BLDE L6.5IN CAUT EXT DISP

## (undated) DEVICE — Device: Brand: POWER-FLO®

## (undated) DEVICE — SOLUTION IV 250ML 0.9% SOD CHL CLR INJ FLX BG CONT PRT CLSR

## (undated) DEVICE — SUTURE PDS + SZ 1 L96IN ABSRB VLT L65MM TP-1 1/2 CIR PDP880G

## (undated) DEVICE — SYR 10ML LUER LOK 1/5ML GRAD --

## (undated) DEVICE — KIT,ANTI FOG,W/SPONGE & FLUID,SOFT PACK: Brand: MEDLINE

## (undated) DEVICE — BAND RUB 1/8X2.5IN STRL --

## (undated) DEVICE — CELLERATERX® SURGICAL ACTIVATED COLLAGEN® POWDER IS TYPE I BOVINE HYDROLYZED COLLAGEN AND CONTAINS NO ADDITIVES.: Brand: CELLERATERX SURGICAL

## (undated) DEVICE — KIT ARMOR C DRP COLLAPSIBLE AND SELF EXP TOP CVR FOR FLUOROSCOPIC

## (undated) DEVICE — AMD ANTIMICROBIAL GAUZE SPONGES,12 PLY USP TYPE VII, 0.2% POLYHEXAMETHYLENE BIGUANIDE HCI (PHMB): Brand: CURITY

## (undated) DEVICE — BUTTON SWITCH PENCIL BLADE ELECTRODE, HOLSTER: Brand: EDGE